# Patient Record
Sex: FEMALE | Race: WHITE | Employment: FULL TIME | URBAN - METROPOLITAN AREA
[De-identification: names, ages, dates, MRNs, and addresses within clinical notes are randomized per-mention and may not be internally consistent; named-entity substitution may affect disease eponyms.]

---

## 2017-01-03 ENCOUNTER — TELEPHONE (OUTPATIENT)
Dept: FAMILY MEDICINE CLINIC | Facility: CLINIC | Age: 52
End: 2017-01-03

## 2017-01-03 DIAGNOSIS — E01.0 THYROMEGALY: ICD-10-CM

## 2017-01-03 DIAGNOSIS — Z12.31 ENCOUNTER FOR SCREENING MAMMOGRAM FOR BREAST CANCER: Primary | ICD-10-CM

## 2017-01-03 NOTE — TELEPHONE ENCOUNTER
Pt has an order for a screening mammogram but the diagnosis needs to be for a screening mammogram, not routine health maintenance. Pt also needs an order for a thyroid ultrasound.

## 2017-01-12 ENCOUNTER — TELEPHONE (OUTPATIENT)
Dept: FAMILY MEDICINE CLINIC | Facility: CLINIC | Age: 52
End: 2017-01-12

## 2017-01-12 ENCOUNTER — HOSPITAL ENCOUNTER (OUTPATIENT)
Dept: ULTRASOUND IMAGING | Age: 52
Discharge: HOME | End: 2017-01-12
Attending: FAMILY MEDICINE
Payer: COMMERCIAL

## 2017-01-12 ENCOUNTER — HOSPITAL ENCOUNTER (OUTPATIENT)
Dept: MAMMOGRAPHY | Age: 52
Discharge: HOME | End: 2017-01-12
Attending: FAMILY MEDICINE
Payer: COMMERCIAL

## 2017-01-12 DIAGNOSIS — E01.0 THYROMEGALY: ICD-10-CM

## 2017-01-12 DIAGNOSIS — E04.1 THYROID NODULE: Primary | ICD-10-CM

## 2017-01-12 DIAGNOSIS — Z12.31 ENCOUNTER FOR SCREENING MAMMOGRAM FOR BREAST CANCER: ICD-10-CM

## 2017-01-12 PROCEDURE — 76536 US EXAM OF HEAD AND NECK: CPT

## 2017-01-12 PROCEDURE — 77067 SCR MAMMO BI INCL CAD: CPT

## 2017-01-12 NOTE — TELEPHONE ENCOUNTER
Pt notified by phone and verbalized understanding. She saw Dr. Jono Frost in the past and will follow up with him regarding the results.

## 2017-01-12 NOTE — TELEPHONE ENCOUNTER
----- Message from Sherryle Curb, DO sent at 1/12/2017  2:38 PM CST -----  Please notify Eh Do her thyroid US showed the nodule to be about the same size, maybe just a bit larger, but I would like her to see an endocrinologist, put in referral for Dr. Nena Calle

## 2017-01-16 RX ORDER — METOPROLOL SUCCINATE 100 MG/1
TABLET, EXTENDED RELEASE ORAL
Qty: 30 TABLET | Refills: 6 | Status: SHIPPED | OUTPATIENT
Start: 2017-01-16 | End: 2017-09-17

## 2017-02-14 ENCOUNTER — PATIENT MESSAGE (OUTPATIENT)
Dept: FAMILY MEDICINE CLINIC | Facility: CLINIC | Age: 52
End: 2017-02-14

## 2017-02-14 DIAGNOSIS — K63.5 POLYP OF COLON, UNSPECIFIED PART OF COLON, UNSPECIFIED TYPE: ICD-10-CM

## 2017-02-14 DIAGNOSIS — K21.00 GASTROESOPHAGEAL REFLUX DISEASE WITH ESOPHAGITIS: ICD-10-CM

## 2017-02-14 DIAGNOSIS — K31.7 MULTIPLE GASTRIC POLYPS: Primary | ICD-10-CM

## 2017-02-14 NOTE — TELEPHONE ENCOUNTER
From: Dilcia Martinez  To:  Kyrie Bowman DO  Sent: 2/14/2017 8:47 AM CST  Subject: Non-Urgent Medical Question    In 2014 I saw Dr. Pauly Brooks with [facility] and his suggestion was to have the endoscopy & colonoscopy done in 3 years rather than

## 2017-02-22 ENCOUNTER — LAB ENCOUNTER (OUTPATIENT)
Dept: LAB | Age: 52
End: 2017-02-22
Attending: INTERNAL MEDICINE
Payer: COMMERCIAL

## 2017-02-22 DIAGNOSIS — E28.2 POLYCYSTIC OVARIES: Primary | ICD-10-CM

## 2017-02-22 DIAGNOSIS — R26.81 UNSTEADY: ICD-10-CM

## 2017-02-22 LAB
FREE T4: 0.9 NG/DL (ref 0.9–1.8)
HAV AB SERPL IA-ACNC: 441 PG/ML (ref 193–986)
TSI SER-ACNC: 2.22 MIU/ML (ref 0.35–5.5)

## 2017-02-22 PROCEDURE — 36415 COLL VENOUS BLD VENIPUNCTURE: CPT

## 2017-02-22 PROCEDURE — 84443 ASSAY THYROID STIM HORMONE: CPT

## 2017-02-22 PROCEDURE — 86376 MICROSOMAL ANTIBODY EACH: CPT

## 2017-02-22 PROCEDURE — 82607 VITAMIN B-12: CPT

## 2017-02-22 PROCEDURE — 84439 ASSAY OF FREE THYROXINE: CPT

## 2017-02-23 ENCOUNTER — MED REC SCAN ONLY (OUTPATIENT)
Dept: FAMILY MEDICINE CLINIC | Facility: CLINIC | Age: 52
End: 2017-02-23

## 2017-02-23 LAB — ANTI-THYROPEROXIDASE: <28 U/ML (ref ?–60)

## 2017-03-22 ENCOUNTER — OFFICE VISIT (OUTPATIENT)
Dept: FAMILY MEDICINE CLINIC | Facility: CLINIC | Age: 52
End: 2017-03-22

## 2017-03-22 VITALS
HEART RATE: 94 BPM | HEIGHT: 64 IN | WEIGHT: 215.38 LBS | SYSTOLIC BLOOD PRESSURE: 152 MMHG | OXYGEN SATURATION: 98 % | DIASTOLIC BLOOD PRESSURE: 86 MMHG | TEMPERATURE: 99 F | RESPIRATION RATE: 18 BRPM | BODY MASS INDEX: 36.77 KG/M2

## 2017-03-22 DIAGNOSIS — R06.00 DYSPNEA ON EXERTION: ICD-10-CM

## 2017-03-22 DIAGNOSIS — R07.2 PRECORDIAL PAIN: Primary | ICD-10-CM

## 2017-03-22 DIAGNOSIS — M75.81 PECTORALIS MAJOR TENDINITIS, RIGHT: ICD-10-CM

## 2017-03-22 PROCEDURE — 99214 OFFICE O/P EST MOD 30 MIN: CPT | Performed by: FAMILY MEDICINE

## 2017-03-22 RX ORDER — METAXALONE 800 MG/1
800 TABLET ORAL 3 TIMES DAILY
Qty: 30 TABLET | Refills: 1 | Status: SHIPPED | OUTPATIENT
Start: 2017-03-22 | End: 2017-04-11
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
## 2017-04-03 ENCOUNTER — OFFICE VISIT (OUTPATIENT)
Dept: FAMILY MEDICINE CLINIC | Facility: CLINIC | Age: 52
End: 2017-04-03

## 2017-04-03 VITALS
HEIGHT: 64 IN | WEIGHT: 215.19 LBS | SYSTOLIC BLOOD PRESSURE: 140 MMHG | HEART RATE: 80 BPM | TEMPERATURE: 98 F | RESPIRATION RATE: 16 BRPM | DIASTOLIC BLOOD PRESSURE: 82 MMHG | BODY MASS INDEX: 36.74 KG/M2

## 2017-04-03 DIAGNOSIS — M54.2 NECK PAIN ON RIGHT SIDE: ICD-10-CM

## 2017-04-03 DIAGNOSIS — R07.9 RIGHT-SIDED CHEST PAIN: ICD-10-CM

## 2017-04-03 DIAGNOSIS — M25.511 ACUTE PAIN OF RIGHT SHOULDER: Primary | ICD-10-CM

## 2017-04-03 PROCEDURE — 99214 OFFICE O/P EST MOD 30 MIN: CPT | Performed by: FAMILY MEDICINE
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
## 2017-05-09 ENCOUNTER — PATIENT OUTREACH (OUTPATIENT)
Dept: FAMILY MEDICINE CLINIC | Facility: CLINIC | Age: 52
End: 2017-05-09

## 2017-05-09 NOTE — PROGRESS NOTES
Left detailed message for pt to call back and schedule nurse appt for BP recheck. Slightly elevated the last time she was here. Please schedule if she calls back.

## 2017-07-18 ENCOUNTER — SURGERY (OUTPATIENT)
Age: 52
End: 2017-07-18

## 2017-07-18 ENCOUNTER — HOSPITAL ENCOUNTER (OUTPATIENT)
Facility: HOSPITAL | Age: 52
Setting detail: HOSPITAL OUTPATIENT SURGERY
Discharge: HOME | End: 2017-07-18
Attending: INTERNAL MEDICINE | Admitting: INTERNAL MEDICINE
Payer: COMMERCIAL

## 2017-07-18 VITALS
DIASTOLIC BLOOD PRESSURE: 83 MMHG | HEART RATE: 82 BPM | OXYGEN SATURATION: 99 % | HEIGHT: 64 IN | SYSTOLIC BLOOD PRESSURE: 131 MMHG | RESPIRATION RATE: 20 BRPM

## 2017-07-18 PROCEDURE — 4A0B7BZ MEASUREMENT OF GASTROINTESTINAL PRESSURE, VIA NATURAL OR ARTIFICIAL OPENING: ICD-10-PCS | Performed by: INTERNAL MEDICINE

## 2017-07-18 NOTE — H&P
[address] [facility] Patient Status:  Hospital Outpatient Surgery    3/3/1965 MRN [id number]   [facility] ENDOSCOPY Attending Donny Springer MD   Hosp Day # 0 PCP Laurel Higgins DO     History of Pr Inhalation Aerosol, Inhale 2 puffs into the lungs 2 (two) times daily. , Disp: 1 Inhaler, Rfl: 12  •  Fluticasone Propionate (FLONASE) 50 MCG/ACT Nasal Suspension, 2 sprays by Each Nare route daily. , Disp: 1 Bottle, Rfl: 3  •  estradiol (CLIMARA) 0.05 MG/24 denies history of depression, anxiety, history of physical or sexual abuse, stress, history of eating disorder. Skin: Denies severe itching, unusual moles, rash, flushing, change in hair or nails.   Bone/joint: Denies arthritis, back pain, joint pain, oste

## 2017-07-19 NOTE — OPERATIVE REPORT
GI Manometry Report    Name: Patti Benito    Referring physician:Dr. Kel Raphael    Attending physician:Dr. Love Paula    Indication: Esophageal dysphagia    Procedure: Esophageal Manometry: A solid state recording assembly comprised of 36 circumferential pressur LES.  A single hypercontractile contraction was appreciated, which is within normal variation. Recommendations: Optimize any reflux management. Avoid dietary triggers          Chew small bites and wash down aggressively.

## 2017-08-29 ENCOUNTER — OFFICE VISIT (OUTPATIENT)
Dept: FAMILY MEDICINE CLINIC | Facility: CLINIC | Age: 52
End: 2017-08-29

## 2017-08-29 VITALS
RESPIRATION RATE: 18 BRPM | SYSTOLIC BLOOD PRESSURE: 140 MMHG | DIASTOLIC BLOOD PRESSURE: 80 MMHG | TEMPERATURE: 98 F | HEART RATE: 80 BPM

## 2017-08-29 DIAGNOSIS — G47.9 SLEEP DISTURBANCE: Primary | ICD-10-CM

## 2017-08-29 DIAGNOSIS — F32.9 REACTIVE DEPRESSION: ICD-10-CM

## 2017-08-29 DIAGNOSIS — S99.911A INJURY OF RIGHT ANKLE, INITIAL ENCOUNTER: ICD-10-CM

## 2017-08-29 PROCEDURE — 99214 OFFICE O/P EST MOD 30 MIN: CPT | Performed by: FAMILY MEDICINE

## 2017-08-29 NOTE — PROGRESS NOTES
Paul Bowie is a 47year old female.   HPI:   Gloria Astudillo thinks she rolled her ankle while walking her dog, she cannot recall the exact mechanism of injury, most of her pain  is anterior and hurts to flex her foot, there is minimal edema noted, has taken noth denies abdominal pain and denies heartburn  NEURO: denies headaches  EXT: right foot/ankle pain  PSYCH: reactive depression due to recent death of   EXAM:   /80   Pulse 80   Temp 97.8 °F (36.6 °C) (Temporal)   Resp 18   LMP 04/01/2007   GENERA

## 2017-08-30 ENCOUNTER — LAB ENCOUNTER (OUTPATIENT)
Dept: LAB | Age: 52
End: 2017-08-30
Attending: INTERNAL MEDICINE
Payer: COMMERCIAL

## 2017-08-30 ENCOUNTER — MED REC SCAN ONLY (OUTPATIENT)
Dept: FAMILY MEDICINE CLINIC | Facility: CLINIC | Age: 52
End: 2017-08-30

## 2017-08-30 DIAGNOSIS — Z86.010 HX OF COLONIC POLYPS: Primary | ICD-10-CM

## 2017-08-30 PROBLEM — D36.9 ADENOMATOUS POLYP: Status: ACTIVE | Noted: 2017-08-30

## 2017-08-30 PROCEDURE — 88305 TISSUE EXAM BY PATHOLOGIST: CPT

## 2017-09-16 NOTE — TELEPHONE ENCOUNTER
Last refill: 1- #30 with 6 refills  Last Visit: 8-  Next Visit: No future appointments. Forward to Dr. Kassy Torres please advise on refills. Thanks.

## 2017-09-17 RX ORDER — METOPROLOL SUCCINATE 100 MG/1
TABLET, EXTENDED RELEASE ORAL
Qty: 30 TABLET | Refills: 5 | Status: SHIPPED | OUTPATIENT
Start: 2017-09-17 | End: 2018-04-28

## 2017-10-25 RX ORDER — PREDNISONE 10 MG/1
TABLET ORAL
Qty: 18 TABLET | Refills: 0 | Status: SHIPPED | OUTPATIENT
Start: 2017-10-25 | End: 2017-11-04 | Stop reason: ALTCHOICE

## 2017-11-04 ENCOUNTER — OFFICE VISIT (OUTPATIENT)
Dept: FAMILY MEDICINE CLINIC | Facility: CLINIC | Age: 52
End: 2017-11-04

## 2017-11-04 VITALS
TEMPERATURE: 98 F | HEART RATE: 108 BPM | HEIGHT: 64 IN | BODY MASS INDEX: 36.64 KG/M2 | SYSTOLIC BLOOD PRESSURE: 132 MMHG | WEIGHT: 214.63 LBS | DIASTOLIC BLOOD PRESSURE: 88 MMHG | RESPIRATION RATE: 16 BRPM

## 2017-11-04 DIAGNOSIS — R00.2 PALPITATIONS: ICD-10-CM

## 2017-11-04 DIAGNOSIS — L23.89 ALLERGIC CONTACT DERMATITIS DUE TO OTHER AGENTS: Primary | ICD-10-CM

## 2017-11-04 PROCEDURE — 99214 OFFICE O/P EST MOD 30 MIN: CPT | Performed by: FAMILY MEDICINE

## 2017-11-04 RX ORDER — MOMETASONE FUROATE 1 MG/G
CREAM TOPICAL
Qty: 60 G | Refills: 1 | Status: SHIPPED | OUTPATIENT
Start: 2017-11-04 | End: 2018-01-17

## 2017-11-04 NOTE — PROGRESS NOTES
Peyman Huntley is a 47year old female.   HPI:   Bashir Hickey, is here for discussion of rash she developed under her breasts, She had poison ivy and was on a prednisone taper, she developed the rash over the course of the last few days, she is also experiencing p rash under both breasts, spreading   RESPIRATORY: denies shortness of breath with exertion  CARDIOVASCULAR: denies chest pain on exertion  GI: denies abdominal pain and denies heartburn  NEURO: denies headaches    EXAM:   /88   Pulse 108   Temp 97.8

## 2017-11-15 ENCOUNTER — OFFICE VISIT (OUTPATIENT)
Dept: FAMILY MEDICINE CLINIC | Facility: CLINIC | Age: 52
End: 2017-11-15

## 2017-11-15 VITALS
SYSTOLIC BLOOD PRESSURE: 130 MMHG | TEMPERATURE: 98 F | RESPIRATION RATE: 16 BRPM | HEART RATE: 88 BPM | DIASTOLIC BLOOD PRESSURE: 80 MMHG

## 2017-11-15 DIAGNOSIS — L24.89 IRRITANT CONTACT DERMATITIS DUE TO OTHER AGENTS: Primary | ICD-10-CM

## 2017-11-15 DIAGNOSIS — E66.9 OBESITY (BMI 30-39.9): ICD-10-CM

## 2017-11-15 DIAGNOSIS — J30.89 CHRONIC NONSEASONAL ALLERGIC RHINITIS DUE TO OTHER ALLERGEN: ICD-10-CM

## 2017-11-15 PROCEDURE — 99214 OFFICE O/P EST MOD 30 MIN: CPT | Performed by: FAMILY MEDICINE

## 2017-11-15 NOTE — PROGRESS NOTES
Padmini Elizabeth is a 47year old female.   HPI:   Buddy Epperson, is here for discussion of rash she developed under her breasts, She had poison ivy and was on a prednisone taper, she developed the rash over the course of the last few days, she is also experiencing p rash under both breasts, spreading   RESPIRATORY: denies shortness of breath with exertion  CARDIOVASCULAR: denies chest pain on exertion  GI: denies abdominal pain and denies heartburn  NEURO: denies headaches    EXAM:   /80   Pulse 88   Temp 98.4 °

## 2017-12-11 ENCOUNTER — TELEPHONE (OUTPATIENT)
Dept: FAMILY MEDICINE CLINIC | Facility: CLINIC | Age: 52
End: 2017-12-11

## 2017-12-11 ENCOUNTER — MED REC SCAN ONLY (OUTPATIENT)
Dept: FAMILY MEDICINE CLINIC | Facility: CLINIC | Age: 52
End: 2017-12-11

## 2017-12-11 NOTE — TELEPHONE ENCOUNTER
LM for pt Per written note by Dr. Rodrigez Prim- labs look good  Follow up in 6 months to a year.     Wendy Beal, 12/11/17, 12:47 PM

## 2017-12-16 ENCOUNTER — PATIENT MESSAGE (OUTPATIENT)
Dept: FAMILY MEDICINE CLINIC | Facility: CLINIC | Age: 52
End: 2017-12-16

## 2017-12-16 NOTE — TELEPHONE ENCOUNTER
From: Lora Holland  To: Faiza Lara DO  Sent: 12/16/2017 8:33 AM CST  Subject: Referral Request    In November I saw Dr Gala Szymanski from [facility] and had a prism added to my lens for the amblyopia \"lazy eye\" that has changed.  Sh

## 2017-12-18 ENCOUNTER — HOSPITAL ENCOUNTER (OUTPATIENT)
Dept: NUCLEAR MEDICINE | Facility: HOSPITAL | Age: 52
Discharge: HOME | End: 2017-12-18
Attending: INTERNAL MEDICINE
Payer: COMMERCIAL

## 2017-12-18 DIAGNOSIS — K30 FUNCTIONAL DYSPEPSIA: ICD-10-CM

## 2017-12-18 DIAGNOSIS — R07.9 CHEST PAIN, UNSPECIFIED TYPE: ICD-10-CM

## 2017-12-18 PROCEDURE — 78264 GASTRIC EMPTYING IMG STUDY: CPT | Performed by: INTERNAL MEDICINE

## 2018-01-03 ENCOUNTER — TELEPHONE (OUTPATIENT)
Dept: FAMILY MEDICINE CLINIC | Facility: CLINIC | Age: 53
End: 2018-01-03

## 2018-01-08 NOTE — TELEPHONE ENCOUNTER
Spoke with [facility] and they do not know of any providers that specialize in vision rehabilitation.   They did suggest that pt see in network optometrist and have them put in the referral along with a letter of medical need stating the pt needed this type of the

## 2018-01-17 ENCOUNTER — HOSPITAL ENCOUNTER (OUTPATIENT)
Age: 53
Discharge: HOME | End: 2018-01-17
Attending: FAMILY MEDICINE
Payer: COMMERCIAL

## 2018-01-17 ENCOUNTER — APPOINTMENT (OUTPATIENT)
Dept: GENERAL RADIOLOGY | Age: 53
End: 2018-01-17
Attending: FAMILY MEDICINE
Payer: COMMERCIAL

## 2018-01-17 VITALS
HEART RATE: 103 BPM | RESPIRATION RATE: 16 BRPM | OXYGEN SATURATION: 98 % | TEMPERATURE: 99 F | SYSTOLIC BLOOD PRESSURE: 146 MMHG | DIASTOLIC BLOOD PRESSURE: 85 MMHG

## 2018-01-17 DIAGNOSIS — I49.3 PREMATURE VENTRICULAR CONTRACTIONS: Primary | ICD-10-CM

## 2018-01-17 LAB
#LYMPHOCYTE IC: 2.7 X10ˆ3/UL (ref 0.9–3.2)
#MXD IC: 0.8 X10ˆ3/UL (ref 0.1–1)
#NEUTROPHIL IC: 4.3 X10ˆ3/UL (ref 1.3–6.7)
CREAT SERPL-MCNC: 1 MG/DL (ref 0.55–1.02)
GLUCOSE BLD-MCNC: 97 MG/DL (ref 70–99)
HCT IC: 38.9 % (ref 37–54)
HGB IC: 12.2 G/DL (ref 11.7–16)
ISTAT BLOOD GAS TCO2: 28 MMOL/L (ref 22–32)
ISTAT BUN: 12 MG/DL (ref 8–20)
ISTAT CHLORIDE: 104 MMOL/L (ref 101–111)
ISTAT HEMATOCRIT: 36 % (ref 34–50)
ISTAT IONIZED CALCIUM: 1.27 MMOL/L (ref 1.12–1.32)
ISTAT POTASSIUM: 3.6 MMOL/L (ref 3.6–5.1)
ISTAT SODIUM: 144 MMOL/L (ref 136–144)
ISTAT TROPONIN: <0.1 NG/ML (ref ?–0.1)
LYMPHOCYTES NFR BLD AUTO: 34.5 %
MCH IC: 27.1 PG (ref 27–33.2)
MCHC IC: 31.4 G/DL (ref 31–37)
MCV IC: 86.4 FL (ref 81–100)
MIXED CELL %: 9.7 %
NEUTROPHILS NFR BLD AUTO: 55.8 %
PLT IC: 497 X10ˆ3/UL (ref 150–450)
RBC IC: 4.5 X10ˆ6/UL (ref 3.8–5.1)
WBC IC: 7.8 X10ˆ3/UL (ref 4–13)

## 2018-01-17 PROCEDURE — 71046 X-RAY EXAM CHEST 2 VIEWS: CPT | Performed by: FAMILY MEDICINE

## 2018-01-17 PROCEDURE — 93010 ELECTROCARDIOGRAM REPORT: CPT

## 2018-01-17 PROCEDURE — 85025 COMPLETE CBC W/AUTO DIFF WBC: CPT | Performed by: FAMILY MEDICINE

## 2018-01-17 PROCEDURE — 80047 BASIC METABLC PNL IONIZED CA: CPT

## 2018-01-17 PROCEDURE — 36415 COLL VENOUS BLD VENIPUNCTURE: CPT

## 2018-01-17 PROCEDURE — 84484 ASSAY OF TROPONIN QUANT: CPT

## 2018-01-17 PROCEDURE — 93005 ELECTROCARDIOGRAM TRACING: CPT

## 2018-01-17 PROCEDURE — 99215 OFFICE O/P EST HI 40 MIN: CPT

## 2018-01-17 PROCEDURE — 99205 OFFICE O/P NEW HI 60 MIN: CPT

## 2018-01-17 RX ORDER — ALBUTEROL SULFATE 90 UG/1
AEROSOL, METERED RESPIRATORY (INHALATION) EVERY 6 HOURS PRN
COMMUNITY

## 2018-01-17 RX ORDER — METOPROLOL SUCCINATE 100 MG/1
100 TABLET, EXTENDED RELEASE ORAL DAILY
COMMUNITY
End: 2018-07-23

## 2018-01-17 RX ORDER — PANTOPRAZOLE SODIUM 40 MG/1
40 TABLET, DELAYED RELEASE ORAL
COMMUNITY
End: 2019-04-06

## 2018-01-17 RX ORDER — ASPIRIN 81 MG/1
324 TABLET, CHEWABLE ORAL ONCE
Status: COMPLETED | OUTPATIENT
Start: 2018-01-17 | End: 2018-01-17

## 2018-01-18 NOTE — ED PROVIDER NOTES
Patient Seen in: [address] [facility]    History   Patient presents with:  Blood Pressure    Stated Complaint: ELEVATED BLOOD PRESSURE, LEG SWELLING     HPI    62-year-old female with history of right bundle branch block, PCO S, and hypertensi per week     Comment: 2 per week       Review of Systems    Positive for stated complaint: ELEVATED BLOOD PRESSURE, LEG SWELLING   Other systems are as noted in HPI. Constitutional and vital signs reviewed.       All other systems reviewed and negative exc oriented to person, place, and time. She has normal strength. No sensory deficit. GCS eye subscore is 4. GCS verbal subscore is 5. GCS motor subscore is 6. Skin: Skin is warm, dry and intact.              ED Course     Labs Reviewed   POCT CBC - Abnormal;

## 2018-01-18 NOTE — ED INITIAL ASSESSMENT (HPI)
Pt sts went to dentist yesterday morning. /102. Took 1/2 tablet of BP med. Later in the evening took BP at [facility] and BP was lower. This morning /87. Was feeling anxious today, felt better after a walk.  Later today felt heart pounding while drnorma

## 2018-01-20 ENCOUNTER — OFFICE VISIT (OUTPATIENT)
Dept: FAMILY MEDICINE CLINIC | Facility: CLINIC | Age: 53
End: 2018-01-20

## 2018-01-20 VITALS
DIASTOLIC BLOOD PRESSURE: 80 MMHG | SYSTOLIC BLOOD PRESSURE: 146 MMHG | HEART RATE: 92 BPM | RESPIRATION RATE: 16 BRPM | WEIGHT: 218 LBS | BODY MASS INDEX: 37.22 KG/M2 | TEMPERATURE: 97 F | HEIGHT: 64 IN

## 2018-01-20 DIAGNOSIS — I45.10 RIGHT BUNDLE BRANCH BLOCK: ICD-10-CM

## 2018-01-20 DIAGNOSIS — E66.9 OBESITY (BMI 30-39.9): ICD-10-CM

## 2018-01-20 DIAGNOSIS — R07.89 OTHER CHEST PAIN: Primary | ICD-10-CM

## 2018-01-20 DIAGNOSIS — K21.00 GASTROESOPHAGEAL REFLUX DISEASE WITH ESOPHAGITIS: ICD-10-CM

## 2018-01-20 DIAGNOSIS — I10 ESSENTIAL HYPERTENSION: ICD-10-CM

## 2018-01-20 LAB
ATRIAL RATE: 95 BPM
P AXIS: 51 DEGREES
P-R INTERVAL: 172 MS
Q-T INTERVAL: 368 MS
QRS DURATION: 98 MS
QTC CALCULATION (BEZET): 462 MS
R AXIS: -4 DEGREES
T AXIS: 43 DEGREES
VENTRICULAR RATE: 95 BPM

## 2018-01-20 PROCEDURE — 99214 OFFICE O/P EST MOD 30 MIN: CPT | Performed by: FAMILY MEDICINE

## 2018-01-20 RX ORDER — HYDROCHLOROTHIAZIDE 25 MG/1
25 TABLET ORAL DAILY
Qty: 30 TABLET | Refills: 6 | Status: SHIPPED | OUTPATIENT
End: 2018-11-19

## 2018-01-20 NOTE — PROGRESS NOTES
Alissa Bunch is a 47year old female. HPI:   Madhavi Valerio was in the dentists office for a teeth cleaning, and her BP was elevated, but she continued to check her BP throughout the course of the day was a 119-988'N and diastolic og 94-70'T.  She felt agitated a Alcohol use: Yes           1.2 oz/week     Standard drinks or equivalent: 2 per week     Comment: 2 per week        REVIEW OF SYSTEMS:   GENERAL HEALTH: feels well otherwise  SKIN: denies any unusual skin lesions or rashes  RESPIRATORY: shortness of br

## 2018-01-25 ENCOUNTER — PATIENT MESSAGE (OUTPATIENT)
Dept: FAMILY MEDICINE CLINIC | Facility: CLINIC | Age: 53
End: 2018-01-25

## 2018-01-25 NOTE — TELEPHONE ENCOUNTER
From: Roosevelt Richardson  To: Farhat Kuhn DO  Sent: 1/25/2018 10:47 AM CST  Subject: Prescription Question    The hydrochlorothiazide is giving me bad heartburn and loose bowels. Is there possibly another pill you could prescribe?

## 2018-02-07 ENCOUNTER — HOSPITAL ENCOUNTER (OUTPATIENT)
Dept: CV DIAGNOSTICS | Age: 53
Discharge: HOME | End: 2018-02-07
Attending: FAMILY MEDICINE
Payer: COMMERCIAL

## 2018-02-07 DIAGNOSIS — R07.89 OTHER CHEST PAIN: ICD-10-CM

## 2018-02-07 PROCEDURE — 93350 STRESS TTE ONLY: CPT | Performed by: FAMILY MEDICINE

## 2018-02-07 PROCEDURE — 93017 CV STRESS TEST TRACING ONLY: CPT | Performed by: FAMILY MEDICINE

## 2018-02-07 PROCEDURE — 93018 CV STRESS TEST I&R ONLY: CPT | Performed by: FAMILY MEDICINE

## 2018-02-12 ENCOUNTER — OFFICE VISIT (OUTPATIENT)
Dept: FAMILY MEDICINE CLINIC | Facility: CLINIC | Age: 53
End: 2018-02-12

## 2018-02-12 ENCOUNTER — MED REC SCAN ONLY (OUTPATIENT)
Dept: FAMILY MEDICINE CLINIC | Facility: CLINIC | Age: 53
End: 2018-02-12

## 2018-02-12 VITALS
TEMPERATURE: 98 F | BODY MASS INDEX: 38 KG/M2 | SYSTOLIC BLOOD PRESSURE: 122 MMHG | RESPIRATION RATE: 14 BRPM | WEIGHT: 220 LBS | DIASTOLIC BLOOD PRESSURE: 70 MMHG | HEART RATE: 86 BPM

## 2018-02-12 DIAGNOSIS — I10 ESSENTIAL HYPERTENSION: ICD-10-CM

## 2018-02-12 DIAGNOSIS — I45.10 RIGHT BUNDLE BRANCH BLOCK: ICD-10-CM

## 2018-02-12 DIAGNOSIS — E66.9 OBESITY (BMI 30-39.9): Primary | ICD-10-CM

## 2018-02-12 PROCEDURE — 99214 OFFICE O/P EST MOD 30 MIN: CPT | Performed by: FAMILY MEDICINE

## 2018-02-12 NOTE — PROGRESS NOTES
HPI:   Alex Yoon is a 47year old female who presents for upper respiratory symptoms for  2  weeks. Patient reports sore throat, congestion, wheezing.  She is using her albuterol infrequently, she feels like her chest is tight and feels abdominal press Hypertension Mother    • Lipids Mother    • Breast Cancer Paternal Aunt 54     SECOND OCCURRANCE AGE [de-identified]      Smoking status: Never Smoker                                                              Smokeless tobacco: Never Used                      Alcoho

## 2018-04-17 ENCOUNTER — OFFICE VISIT (OUTPATIENT)
Dept: FAMILY MEDICINE CLINIC | Facility: CLINIC | Age: 53
End: 2018-04-17

## 2018-04-17 ENCOUNTER — HOSPITAL ENCOUNTER (OUTPATIENT)
Dept: GENERAL RADIOLOGY | Age: 53
Discharge: HOME | End: 2018-04-17
Attending: FAMILY MEDICINE
Payer: COMMERCIAL

## 2018-04-17 VITALS
BODY MASS INDEX: 37.19 KG/M2 | TEMPERATURE: 99 F | HEIGHT: 64 IN | RESPIRATION RATE: 14 BRPM | HEART RATE: 87 BPM | DIASTOLIC BLOOD PRESSURE: 82 MMHG | WEIGHT: 217.81 LBS | SYSTOLIC BLOOD PRESSURE: 112 MMHG

## 2018-04-17 DIAGNOSIS — M23.312 MEDIAL MENISCUS, ANTERIOR HORN DERANGEMENT, LEFT: ICD-10-CM

## 2018-04-17 DIAGNOSIS — Z12.31 ENCOUNTER FOR SCREENING MAMMOGRAM FOR BREAST CANCER: Primary | ICD-10-CM

## 2018-04-17 DIAGNOSIS — M25.562 ACUTE PAIN OF LEFT KNEE: ICD-10-CM

## 2018-04-17 DIAGNOSIS — R10.31 ABDOMINAL PAIN, RIGHT LOWER QUADRANT: ICD-10-CM

## 2018-04-17 PROCEDURE — 73562 X-RAY EXAM OF KNEE 3: CPT | Performed by: FAMILY MEDICINE

## 2018-04-17 PROCEDURE — 99214 OFFICE O/P EST MOD 30 MIN: CPT | Performed by: FAMILY MEDICINE

## 2018-04-17 RX ORDER — NAPROXEN 500 MG/1
TABLET ORAL
Qty: 180 TABLET | Refills: 0 | OUTPATIENT
Start: 2018-04-17

## 2018-04-17 RX ORDER — NAPROXEN 500 MG/1
500 TABLET ORAL 2 TIMES DAILY WITH MEALS
Qty: 28 TABLET | Refills: 0 | Status: SHIPPED | OUTPATIENT
Start: 2018-04-17 | End: 2019-10-08

## 2018-04-17 NOTE — PROGRESS NOTES
Edison Boyd is a 46year old female. HPI:   Yeny Jung is here for follow up on knee pain after she did some squats and then fell going up the stairs, since then thy have been painful and feels a sharp stabbing pain in the medial aspect of her knee.  It does Never Used                      Alcohol use: Yes           1.2 oz/week     Standard drinks or equivalent: 2 per week     Comment: 2 per week        REVIEW OF SYSTEMS:   GENERAL HEALTH: feels well otherwise  SKIN: denies any unusual skin lesions or rashes also given order for mammogram   And CT abdomen to evaluate pain.

## 2018-04-17 NOTE — PROGRESS NOTES
Location of pain: left knee, hurts more to walk  Pain score:  5/10  Duration of pain:  1 month  Redness? No  Swelling? unsure  Warm to the touch? No  Taken meds for the pain? Yes  What med? Advil How often? Prn with no relief Last time taken?  yesterday

## 2018-04-18 ENCOUNTER — HOSPITAL ENCOUNTER (OUTPATIENT)
Dept: CT IMAGING | Facility: HOSPITAL | Age: 53
Discharge: HOME | End: 2018-04-18
Attending: FAMILY MEDICINE
Payer: COMMERCIAL

## 2018-04-18 DIAGNOSIS — R10.31 ABDOMINAL PAIN, RIGHT LOWER QUADRANT: ICD-10-CM

## 2018-04-18 PROCEDURE — 74176 CT ABD & PELVIS W/O CONTRAST: CPT | Performed by: FAMILY MEDICINE

## 2018-04-19 ENCOUNTER — TELEPHONE (OUTPATIENT)
Dept: FAMILY MEDICINE CLINIC | Facility: CLINIC | Age: 53
End: 2018-04-19

## 2018-04-19 DIAGNOSIS — K21.00 GASTROESOPHAGEAL REFLUX DISEASE WITH ESOPHAGITIS: ICD-10-CM

## 2018-04-19 DIAGNOSIS — K75.81 NASH (NONALCOHOLIC STEATOHEPATITIS): Primary | ICD-10-CM

## 2018-04-19 DIAGNOSIS — Z00.00 ROUTINE HEALTH MAINTENANCE: ICD-10-CM

## 2018-04-19 NOTE — TELEPHONE ENCOUNTER
----- Message from Crow Reyna DO sent at 4/19/2018  9:54 AM CDT -----  Can notify Ignacia, that her CT showed nothing major in her abdomen, other than she has a fatty liver.  Which is generally related to her weight, can sometimes be associated with diabet

## 2018-04-19 NOTE — TELEPHONE ENCOUNTER
Pt advised of results- verbalized understanding.     I advised pt that labs have been order and she can do those at her follow up visit or as a nurse visit

## 2018-04-30 ENCOUNTER — HOSPITAL ENCOUNTER (OUTPATIENT)
Dept: MAMMOGRAPHY | Facility: HOSPITAL | Age: 53
Discharge: HOME | End: 2018-04-30
Attending: FAMILY MEDICINE
Payer: COMMERCIAL

## 2018-04-30 DIAGNOSIS — Z12.31 ENCOUNTER FOR SCREENING MAMMOGRAM FOR BREAST CANCER: ICD-10-CM

## 2018-04-30 PROCEDURE — 77063 BREAST TOMOSYNTHESIS BI: CPT | Performed by: FAMILY MEDICINE

## 2018-04-30 PROCEDURE — 77067 SCR MAMMO BI INCL CAD: CPT | Performed by: FAMILY MEDICINE

## 2018-04-30 RX ORDER — METOPROLOL SUCCINATE 100 MG/1
TABLET, EXTENDED RELEASE ORAL
Qty: 90 TABLET | Refills: 3 | Status: SHIPPED | OUTPATIENT
Start: 2018-04-30 | End: 2019-05-07

## 2018-04-30 NOTE — TELEPHONE ENCOUNTER
Last refill: 9- #30 with 5 refills  Last Visit: 4-  Next Visit: No Future Appointments        Forward to Dr. Leslie Roldan please advise on refills. Thanks.

## 2018-05-01 ENCOUNTER — OFFICE VISIT (OUTPATIENT)
Dept: PHYSICAL THERAPY | Age: 53
End: 2018-05-01
Attending: FAMILY MEDICINE
Payer: COMMERCIAL

## 2018-05-01 DIAGNOSIS — M23.312 MEDIAL MENISCUS, ANTERIOR HORN DERANGEMENT, LEFT: ICD-10-CM

## 2018-05-01 DIAGNOSIS — M25.562 ACUTE PAIN OF LEFT KNEE: ICD-10-CM

## 2018-05-01 PROCEDURE — 97110 THERAPEUTIC EXERCISES: CPT

## 2018-05-01 PROCEDURE — 97161 PT EVAL LOW COMPLEX 20 MIN: CPT

## 2018-05-01 PROCEDURE — 97140 MANUAL THERAPY 1/> REGIONS: CPT

## 2018-05-01 NOTE — PROGRESS NOTES
INITIAL EVALUATION:   Referring Physician: Dr. Carly Montesinos  Diagnosis:   -Acute pain of left knee (M25.562)  -Medial meniscus, anterior horn derangement, left (M23.312)     Date of Service: 5/1/2018     PATIENT SUMMARY    Chela Vallejo is a 46year old femal motion and terminal knee extension following recent fall. She has radiographs unremarkable for fracture.   Janae Roque would benefit from services of the physical therapist to address the above impairments to restore prior level of function  -Likely to respond care.    X___________________________________________________ Date____________________    Certification From: 4/0/8559  To:7/30/2018

## 2018-05-08 ENCOUNTER — OFFICE VISIT (OUTPATIENT)
Dept: PHYSICAL THERAPY | Age: 53
End: 2018-05-08
Attending: FAMILY MEDICINE
Payer: COMMERCIAL

## 2018-05-08 DIAGNOSIS — M25.562 ACUTE PAIN OF LEFT KNEE: ICD-10-CM

## 2018-05-08 DIAGNOSIS — M23.312 MEDIAL MENISCUS, ANTERIOR HORN DERANGEMENT, LEFT: ICD-10-CM

## 2018-05-08 PROCEDURE — 97140 MANUAL THERAPY 1/> REGIONS: CPT

## 2018-05-08 PROCEDURE — 97530 THERAPEUTIC ACTIVITIES: CPT

## 2018-05-08 PROCEDURE — 97110 THERAPEUTIC EXERCISES: CPT

## 2018-05-08 NOTE — PROGRESS NOTES
Dx:        -Acute pain of left knee   -Medial meniscus, anterior horn derangement      Authorized # of Visits:  8         Next MD visit: none scheduled  Fall Risk: standard           Precautions: n/a             Subjective: Patient reports that she feels s term goals - 8 visits)  -Demonstrate ability to ascend/descend flight stairs without no pain  -Able to perform squatting activities without complaint  -Demonstrate at least 130 degrees left knee flexion for ADL tasks.     Plan: Progress with stationary bike

## 2018-05-10 ENCOUNTER — OFFICE VISIT (OUTPATIENT)
Dept: PHYSICAL THERAPY | Age: 53
End: 2018-05-10
Attending: FAMILY MEDICINE
Payer: COMMERCIAL

## 2018-05-10 DIAGNOSIS — M23.312 MEDIAL MENISCUS, ANTERIOR HORN DERANGEMENT, LEFT: ICD-10-CM

## 2018-05-10 DIAGNOSIS — M25.562 ACUTE PAIN OF LEFT KNEE: ICD-10-CM

## 2018-05-10 PROCEDURE — 97140 MANUAL THERAPY 1/> REGIONS: CPT

## 2018-05-10 PROCEDURE — 97110 THERAPEUTIC EXERCISES: CPT

## 2018-05-10 NOTE — PROGRESS NOTES
Dx:        -Acute pain of left knee   -Medial meniscus, anterior horn derangement      Authorized # of Visits:  8         Next MD visit: none scheduled  Fall Risk: standard           Precautions: n/a             Subjective:  Patient reports she has been do consisting of continuous active range of motion knee flexion/extensionwith LE on stability ball x 5 min -Therapeutic excercise consisting of reviewed of heal slide with repeated extension progression with supine straight leg raise in neutral and slight ext

## 2018-05-15 ENCOUNTER — OFFICE VISIT (OUTPATIENT)
Dept: PHYSICAL THERAPY | Age: 53
End: 2018-05-15
Attending: FAMILY MEDICINE
Payer: COMMERCIAL

## 2018-05-15 DIAGNOSIS — M23.312 MEDIAL MENISCUS, ANTERIOR HORN DERANGEMENT, LEFT: ICD-10-CM

## 2018-05-15 DIAGNOSIS — M25.562 ACUTE PAIN OF LEFT KNEE: ICD-10-CM

## 2018-05-15 PROCEDURE — 97110 THERAPEUTIC EXERCISES: CPT

## 2018-05-15 PROCEDURE — 97140 MANUAL THERAPY 1/> REGIONS: CPT

## 2018-05-15 NOTE — PROGRESS NOTES
Dx:        -Acute pain of left knee   -Medial meniscus, anterior horn derangement      Authorized # of Visits:  8         Next MD visit: none scheduled  Fall Risk: standard           Precautions: n/a             Subjective:  Patient reports she is feeling to ascend/descend flight stairs without no pain  -Able to perform squatting activities without complaint  -Demonstrate at least 130 degrees left knee flexion for ADL tasks.     Plan: Re-assess terminal knee extension; continue with single leg press; assess ball x 5 min    -Therapeutic excercise consisting of instruction in terminal knee extension with resisted band x 30 reps x 3 sets with band and door anchor provide to patient -Manual Therapy consisting of joint mobilization to restore full terminal knee ex

## 2018-05-17 ENCOUNTER — OFFICE VISIT (OUTPATIENT)
Dept: PHYSICAL THERAPY | Age: 53
End: 2018-05-17
Attending: FAMILY MEDICINE
Payer: COMMERCIAL

## 2018-05-17 DIAGNOSIS — M25.562 ACUTE PAIN OF LEFT KNEE: ICD-10-CM

## 2018-05-17 DIAGNOSIS — M23.312 MEDIAL MENISCUS, ANTERIOR HORN DERANGEMENT, LEFT: ICD-10-CM

## 2018-05-17 PROCEDURE — 97110 THERAPEUTIC EXERCISES: CPT

## 2018-05-17 PROCEDURE — 97140 MANUAL THERAPY 1/> REGIONS: CPT

## 2018-05-17 NOTE — PROGRESS NOTES
Dx:        -Acute pain of left knee   -Medial meniscus, anterior horn derangement      Authorized # of Visits:  8         Next MD visit: none scheduled  Fall Risk: standard           Precautions: n/a           Subjective:   Patient reports decrease stiffne stationary bike for 8 minutes for range of motion stimulus     -Therapeutic excercise consisting of reviewed of heal slide with repeated extension progression with supine straight leg raise in neutral and slight external rotation with patient performing 8 knee extension -Manual Therapy consisting of joint mobilization to restore full terminal knee extension    -Therapeutic excercise consisting of DL leg press level 6.0 resistance: 25 reps x 2 sets -Therapeutic exercise consisting of heal lift w/ terminal kn

## 2018-05-19 ENCOUNTER — PATIENT OUTREACH (OUTPATIENT)
Dept: FAMILY MEDICINE CLINIC | Facility: CLINIC | Age: 53
End: 2018-05-19

## 2018-05-22 ENCOUNTER — OFFICE VISIT (OUTPATIENT)
Dept: PHYSICAL THERAPY | Age: 53
End: 2018-05-22
Attending: FAMILY MEDICINE
Payer: COMMERCIAL

## 2018-05-22 DIAGNOSIS — M25.562 ACUTE PAIN OF LEFT KNEE: ICD-10-CM

## 2018-05-22 DIAGNOSIS — M23.312 MEDIAL MENISCUS, ANTERIOR HORN DERANGEMENT, LEFT: ICD-10-CM

## 2018-05-22 PROCEDURE — 97140 MANUAL THERAPY 1/> REGIONS: CPT

## 2018-05-22 PROCEDURE — 97110 THERAPEUTIC EXERCISES: CPT

## 2018-05-22 NOTE — PROGRESS NOTES
Dx:        -Acute pain of left knee   -Medial meniscus, anterior horn derangement      Authorized # of Visits:  8         Next MD visit: none scheduled  Fall Risk: standard           Precautions: n/a           Subjective:   Described medial knee pain; over -Therapeutic excercise consisting of reviewed of heal slide with repeated extension progression with supine straight leg raise in neutral and slight external rotation with patient performing 8 reps to point of fatigue.   (HEP)  -Manual Therapy consisting excercise consisting of instruction in terminal knee extension with resisted band x 30 reps x 3 sets with band and door anchor provide to patient -Manual Therapy consisting of joint mobilization to restore full terminal knee extension -Manual Therapy consi

## 2018-05-24 ENCOUNTER — OFFICE VISIT (OUTPATIENT)
Dept: PHYSICAL THERAPY | Age: 53
End: 2018-05-24
Attending: FAMILY MEDICINE
Payer: COMMERCIAL

## 2018-05-24 DIAGNOSIS — M25.562 ACUTE PAIN OF LEFT KNEE: ICD-10-CM

## 2018-05-24 DIAGNOSIS — M23.312 MEDIAL MENISCUS, ANTERIOR HORN DERANGEMENT, LEFT: ICD-10-CM

## 2018-05-24 PROCEDURE — 97110 THERAPEUTIC EXERCISES: CPT

## 2018-05-24 PROCEDURE — 97530 THERAPEUTIC ACTIVITIES: CPT

## 2018-05-25 NOTE — PROGRESS NOTES
Dx:        -Acute pain of left knee   -Medial meniscus, anterior horn derangement      Authorized # of Visits:  8         Next MD visit: none scheduled  Fall Risk: standard           Precautions: n/a           Subjective:   Patient reports she was sore aft term goals - 8 visits  -Demonstrate ability to ascend/descend flight stairs without no pain  -Able to perform squatting activities without complaint  -Demonstrate at least 130 degrees left knee flexion for ADL tasks.   -Goal met    Plan:  Progress home prog extension progression with supine straight leg raise in neutral and slight external rotation with patient performing 12 reps to point of fatigue -Manual Therapy consisting of joint mobilization to restore full terminal knee extension   -Manual Therapy cons mobilization tibial external rotation -Supine knee flexion/extensionwith LE stability ball: 5 minutes (denies knee pain)                             Charges:    Therapeutic excercise x 2  Therapeutic activity x 1   Total Timed Treatment: 40 min    Total Chirag

## 2018-05-29 ENCOUNTER — OFFICE VISIT (OUTPATIENT)
Dept: PHYSICAL THERAPY | Age: 53
End: 2018-05-29
Attending: FAMILY MEDICINE
Payer: COMMERCIAL

## 2018-05-29 DIAGNOSIS — M23.312 MEDIAL MENISCUS, ANTERIOR HORN DERANGEMENT, LEFT: ICD-10-CM

## 2018-05-29 DIAGNOSIS — M25.562 ACUTE PAIN OF LEFT KNEE: ICD-10-CM

## 2018-05-29 PROCEDURE — 97110 THERAPEUTIC EXERCISES: CPT

## 2018-05-29 PROCEDURE — 97530 THERAPEUTIC ACTIVITIES: CPT

## 2018-05-29 NOTE — PROGRESS NOTES
Discharge Summary    Pt has attended 8 visits in Physical Therapy.      Dx:        -Acute pain of left knee   -Medial meniscus, anterior horn derangement  Authorized # of Visits:  8         Next MD visit: none scheduled  Fall Risk: standard           Preca visits  -Demonstrate ability to ascend/descend flight stairs without no pain  -Goal unmet; progressing toward goal achievement  -Able to perform squatting activities without complaint  -Goal unmet; progressing toward goal achievement  -Demonstrate at least shuttle 6.0 level resistance: 20 reps x 3 sets  -Single leg press @ shuttle 4.0 level resistance: 15 reps x 3 sets   -Discontinued double leg press   -Single leg press @ shuttle 4.0 level resistance: 15 reps x 3 sets -Single leg press @ shuttle 4.0 level r step - down 4 inch step 12 reps x 3 sets (2/10 knee pain - L) Lateral step - down 4 inch step 12 reps x 3 sets (2/10 knee pain - L)    -Therapeutic excercise consisting of instruction in terminal knee extension with resisted band x 30 reps x 3 sets with ba

## 2018-07-23 ENCOUNTER — OFFICE VISIT (OUTPATIENT)
Dept: FAMILY MEDICINE CLINIC | Facility: CLINIC | Age: 53
End: 2018-07-23

## 2018-07-23 VITALS
BODY MASS INDEX: 37 KG/M2 | SYSTOLIC BLOOD PRESSURE: 130 MMHG | DIASTOLIC BLOOD PRESSURE: 86 MMHG | HEART RATE: 84 BPM | WEIGHT: 215 LBS

## 2018-07-23 DIAGNOSIS — M25.562 CHRONIC PAIN OF LEFT KNEE: Primary | ICD-10-CM

## 2018-07-23 DIAGNOSIS — G89.29 CHRONIC PAIN OF LEFT KNEE: Primary | ICD-10-CM

## 2018-07-23 DIAGNOSIS — Z00.00 ROUTINE HEALTH MAINTENANCE: ICD-10-CM

## 2018-07-23 DIAGNOSIS — Z15.89 HLA B27 (HLA B27 POSITIVE): ICD-10-CM

## 2018-07-23 DIAGNOSIS — M23.312 MEDIAL MENISCUS, ANTERIOR HORN DERANGEMENT, LEFT: ICD-10-CM

## 2018-07-23 DIAGNOSIS — K75.81 NASH (NONALCOHOLIC STEATOHEPATITIS): ICD-10-CM

## 2018-07-23 DIAGNOSIS — I10 ESSENTIAL HYPERTENSION: ICD-10-CM

## 2018-07-23 LAB
ALBUMIN SERPL-MCNC: 3.5 G/DL (ref 3.5–4.8)
ALBUMIN/GLOB SERPL: 0.8 {RATIO} (ref 1–2)
ALP LIVER SERPL-CCNC: 121 U/L (ref 41–108)
ALT SERPL-CCNC: 63 U/L (ref 14–54)
ANION GAP SERPL CALC-SCNC: 8 MMOL/L (ref 0–18)
AST SERPL-CCNC: 36 U/L (ref 15–41)
BILIRUB SERPL-MCNC: 0.4 MG/DL (ref 0.1–2)
BUN BLD-MCNC: 10 MG/DL (ref 8–20)
BUN/CREAT SERPL: 10.2 (ref 10–20)
CALCIUM BLD-MCNC: 9.6 MG/DL (ref 8.3–10.3)
CHLORIDE SERPL-SCNC: 108 MMOL/L (ref 101–111)
CHOLEST SMN-MCNC: 171 MG/DL (ref ?–200)
CO2 SERPL-SCNC: 27 MMOL/L (ref 22–32)
CREAT BLD-MCNC: 0.98 MG/DL (ref 0.55–1.02)
DEPRECATED HBV CORE AB SER IA-ACNC: 13.7 NG/ML (ref 18–340)
ERYTHROCYTE [DISTWIDTH] IN BLOOD BY AUTOMATED COUNT: 12.8 % (ref 11.5–16)
GLOBULIN PLAS-MCNC: 4.2 G/DL (ref 2.5–3.7)
GLUCOSE BLD-MCNC: 117 MG/DL (ref 70–99)
HAV IGM SER QL: NONREACTIVE
HBV CORE IGM SER QL: NONREACTIVE
HBV SURFACE AG SERPL QL IA: NONREACTIVE
HCT VFR BLD AUTO: 42.7 % (ref 34–50)
HCV AB SERPL QL IA: NONREACTIVE
HDLC SERPL-MCNC: 45 MG/DL (ref 40–59)
HGB BLD-MCNC: 14.3 G/DL (ref 12–16)
LDLC SERPL CALC-MCNC: 112 MG/DL (ref ?–100)
M PROTEIN MFR SERPL ELPH: 7.7 G/DL (ref 6.1–8.3)
MCH RBC QN AUTO: 30.4 PG (ref 27–33.2)
MCHC RBC AUTO-ENTMCNC: 33.5 G/DL (ref 31–37)
MCV RBC AUTO: 90.7 FL (ref 81–100)
NONHDLC SERPL-MCNC: 126 MG/DL (ref ?–130)
OSMOLALITY SERPL CALC.SUM OF ELEC: 296 MOSM/KG (ref 275–295)
PLATELET # BLD AUTO: 333 10(3)UL (ref 150–450)
POTASSIUM SERPL-SCNC: 3.8 MMOL/L (ref 3.6–5.1)
RBC # BLD AUTO: 4.71 X10(6)UL (ref 3.8–5.1)
RED CELL DISTRIBUTION WIDTH-SD: 42.2 FL (ref 35.1–46.3)
SODIUM SERPL-SCNC: 143 MMOL/L (ref 136–144)
T4 FREE SERPL-MCNC: 0.8 NG/DL (ref 0.9–1.8)
TRIGL SERPL-MCNC: 72 MG/DL (ref 30–149)
TSI SER-ACNC: 2.19 MIU/ML (ref 0.35–5.5)
VLDLC SERPL CALC-MCNC: 14 MG/DL (ref 0–30)
WBC # BLD AUTO: 6.1 X10(3) UL (ref 4–13)

## 2018-07-23 PROCEDURE — 36415 COLL VENOUS BLD VENIPUNCTURE: CPT | Performed by: FAMILY MEDICINE

## 2018-07-23 PROCEDURE — 85027 COMPLETE CBC AUTOMATED: CPT | Performed by: FAMILY MEDICINE

## 2018-07-23 PROCEDURE — 86225 DNA ANTIBODY NATIVE: CPT | Performed by: FAMILY MEDICINE

## 2018-07-23 PROCEDURE — 80074 ACUTE HEPATITIS PANEL: CPT | Performed by: FAMILY MEDICINE

## 2018-07-23 PROCEDURE — 86235 NUCLEAR ANTIGEN ANTIBODY: CPT | Performed by: FAMILY MEDICINE

## 2018-07-23 PROCEDURE — 82728 ASSAY OF FERRITIN: CPT | Performed by: FAMILY MEDICINE

## 2018-07-23 PROCEDURE — 80053 COMPREHEN METABOLIC PANEL: CPT | Performed by: FAMILY MEDICINE

## 2018-07-23 PROCEDURE — 84443 ASSAY THYROID STIM HORMONE: CPT | Performed by: FAMILY MEDICINE

## 2018-07-23 PROCEDURE — 84439 ASSAY OF FREE THYROXINE: CPT | Performed by: FAMILY MEDICINE

## 2018-07-23 PROCEDURE — 86038 ANTINUCLEAR ANTIBODIES: CPT | Performed by: FAMILY MEDICINE

## 2018-07-23 PROCEDURE — 80061 LIPID PANEL: CPT | Performed by: FAMILY MEDICINE

## 2018-07-23 PROCEDURE — 99214 OFFICE O/P EST MOD 30 MIN: CPT | Performed by: FAMILY MEDICINE

## 2018-07-23 NOTE — PROGRESS NOTES
1 gold, mint and lavender tube  Collected from L AC using straight needle and 1 attempt. Pt tolerated and was sent home in stable condition.

## 2018-07-23 NOTE — PROGRESS NOTES
Alissa Bunch is a 46year old female.   HPI:   Madhavi Valerio is here for follow up on her knee, and it continues to cause her pain, it is worse when she bends it, it continues to lock  and cause pain, she has been doing PT, and even after that it continues to be • Polycystic ovaries    • Uterine fibroid       Social History:  Smoking status: Never Smoker                                                              Smokeless tobacco: Never Used                      Alcohol use: Yes           1.2 oz/week     Maulik Lam

## 2018-07-24 ENCOUNTER — TELEPHONE (OUTPATIENT)
Dept: FAMILY MEDICINE CLINIC | Facility: CLINIC | Age: 53
End: 2018-07-24

## 2018-07-24 DIAGNOSIS — R79.0 LOW IRON STORES: Primary | ICD-10-CM

## 2018-07-25 LAB
ANA SCREEN: POSITIVE
CENTROMERE AUTOAB: <100 AU/ML (ref ?–100)
DSDNA AUTOAB: <100 IU/ML (ref ?–100)
HISTONE AUTOAB: <100 AU/ML (ref ?–100)
JO-1 AUTOAB: <100 AU/ML (ref ?–100)
RNP AUTOAB: 122 AU/ML (ref ?–100)
SCL-70 AUTOAB: <100 AU/ML (ref ?–100)
SM AUTOAB (SMITH): <100 AU/ML (ref ?–100)
SSA AUTOAB: <100 AU/ML (ref ?–100)
SSB AUTOAB: <100 AU/ML (ref ?–100)

## 2018-07-26 ENCOUNTER — TELEPHONE (OUTPATIENT)
Dept: FAMILY MEDICINE CLINIC | Facility: CLINIC | Age: 53
End: 2018-07-26

## 2018-07-26 NOTE — TELEPHONE ENCOUNTER
Pt advised of results- verbalized understanding    Results fraxed to Dr. Bruno Sabillon [phone]

## 2018-07-26 NOTE — TELEPHONE ENCOUNTER
----- Message from Anjali Aleman DO sent at 7/26/2018  2:47 PM CDT -----  Can notify Chely Mylar her GABRIEL came back Positive , but just barely and likely not enough to cause any issues, I think Dr. Graciela Carey ordered these GI let her know  we forwarded  them t

## 2018-07-31 ENCOUNTER — TELEPHONE (OUTPATIENT)
Dept: FAMILY MEDICINE CLINIC | Facility: CLINIC | Age: 53
End: 2018-07-31

## 2018-07-31 ENCOUNTER — HOSPITAL ENCOUNTER (OUTPATIENT)
Dept: MRI IMAGING | Age: 53
Discharge: HOME | End: 2018-07-31
Attending: FAMILY MEDICINE
Payer: COMMERCIAL

## 2018-07-31 DIAGNOSIS — G89.29 CHRONIC PAIN OF LEFT KNEE: ICD-10-CM

## 2018-07-31 DIAGNOSIS — M25.562 CHRONIC PAIN OF LEFT KNEE: ICD-10-CM

## 2018-07-31 DIAGNOSIS — M23.312 MEDIAL MENISCUS, ANTERIOR HORN DERANGEMENT, LEFT: Primary | ICD-10-CM

## 2018-07-31 DIAGNOSIS — M25.562 MEDIAL KNEE PAIN, LEFT: ICD-10-CM

## 2018-07-31 PROCEDURE — 73721 MRI JNT OF LWR EXTRE W/O DYE: CPT | Performed by: FAMILY MEDICINE

## 2018-07-31 NOTE — TELEPHONE ENCOUNTER
----- Message from Laurel Higgins DO sent at 7/31/2018  1:52 PM CDT -----  Can notify [facility] her MRI showed jayne issues with the cartilage in her knee, and she has a mild MCL sprain, if she has not seen an ortho I would suggest Dr. Conley Beams [phone], or

## 2018-07-31 NOTE — TELEPHONE ENCOUNTER
Pt notified of results- verbalized understanding.   Pt was provided with the phone number to set up a visit with Dr. Vlad Galicia

## 2018-10-15 DIAGNOSIS — E66.9 OBESITY (BMI 30-39.9): ICD-10-CM

## 2018-10-15 DIAGNOSIS — J45.990 EXERCISE-INDUCED ASTHMA: ICD-10-CM

## 2018-10-15 DIAGNOSIS — J30.1 ALLERGIC RHINITIS DUE TO POLLEN: ICD-10-CM

## 2018-10-15 RX ORDER — BUDESONIDE AND FORMOTEROL FUMARATE DIHYDRATE 160; 4.5 UG/1; UG/1
2 AEROSOL RESPIRATORY (INHALATION) 2 TIMES DAILY
Qty: 1 INHALER | Refills: 6 | Status: SHIPPED | OUTPATIENT
Start: 2018-10-15 | End: 2020-10-13

## 2018-11-20 RX ORDER — HYDROCHLOROTHIAZIDE 25 MG/1
TABLET ORAL
Qty: 30 TABLET | Refills: 5 | Status: SHIPPED | OUTPATIENT
Start: 2018-11-20 | End: 2020-04-27

## 2018-11-23 ENCOUNTER — TELEPHONE (OUTPATIENT)
Dept: FAMILY MEDICINE CLINIC | Facility: CLINIC | Age: 53
End: 2018-11-23

## 2018-12-04 ENCOUNTER — TELEPHONE (OUTPATIENT)
Dept: FAMILY MEDICINE CLINIC | Facility: CLINIC | Age: 53
End: 2018-12-04

## 2018-12-04 NOTE — TELEPHONE ENCOUNTER
Lab results received from [facility]    Per DS over all labs looked good- her lipids were just a bit higher this time but not terrible.     LM for pt to call back

## 2018-12-13 ENCOUNTER — HOSPITAL ENCOUNTER (OUTPATIENT)
Dept: GENERAL RADIOLOGY | Age: 53
Discharge: HOME | End: 2018-12-13
Attending: FAMILY MEDICINE
Payer: COMMERCIAL

## 2018-12-13 ENCOUNTER — OFFICE VISIT (OUTPATIENT)
Dept: FAMILY MEDICINE CLINIC | Facility: CLINIC | Age: 53
End: 2018-12-13

## 2018-12-13 VITALS
WEIGHT: 220.38 LBS | TEMPERATURE: 98 F | BODY MASS INDEX: 37.62 KG/M2 | SYSTOLIC BLOOD PRESSURE: 128 MMHG | HEIGHT: 64 IN | RESPIRATION RATE: 14 BRPM | DIASTOLIC BLOOD PRESSURE: 88 MMHG | HEART RATE: 74 BPM

## 2018-12-13 DIAGNOSIS — M79.605 LEFT LEG PAIN: Primary | ICD-10-CM

## 2018-12-13 DIAGNOSIS — M79.605 LEFT LEG PAIN: ICD-10-CM

## 2018-12-13 DIAGNOSIS — S80.12XA HEMATOMA OF LEFT LOWER EXTREMITY, INITIAL ENCOUNTER: ICD-10-CM

## 2018-12-13 DIAGNOSIS — S80.12XA CONTUSION OF LEFT LOWER LEG, INITIAL ENCOUNTER: ICD-10-CM

## 2018-12-13 PROCEDURE — 99213 OFFICE O/P EST LOW 20 MIN: CPT | Performed by: FAMILY MEDICINE

## 2018-12-13 PROCEDURE — 73590 X-RAY EXAM OF LOWER LEG: CPT | Performed by: FAMILY MEDICINE

## 2018-12-13 NOTE — PROGRESS NOTES
Kevin Degroot is a 46year old female.   HPI:   Rd Joy is here for evaluation of left shin pain after she fell and struck her leg on a curb, and since then has shad persistent pain and tenderness, there was some swelling, but it is now less swollen there is exertion  CARDIOVASCULAR: denies chest pain on exertion  GI: denies abdominal pain and denies heartburn  NEURO: denies headaches    EXAM:   /88   Pulse 74   Temp 98.4 °F (36.9 °C) (Temporal)   Resp 14   Ht 64\"   Wt 220 lb 6.4 oz   LMP 04/01/2007   B

## 2019-01-28 RX ORDER — OSELTAMIVIR PHOSPHATE 75 MG/1
75 CAPSULE ORAL 2 TIMES DAILY
Qty: 10 CAPSULE | Refills: 0 | Status: SHIPPED | OUTPATIENT
Start: 2019-01-28 | End: 2019-10-08

## 2019-03-29 ENCOUNTER — TELEPHONE (OUTPATIENT)
Dept: FAMILY MEDICINE CLINIC | Facility: CLINIC | Age: 54
End: 2019-03-29

## 2019-03-29 NOTE — TELEPHONE ENCOUNTER
Patient reports a year of occassional PVCs for which she had stress test last year that was normal.    The past few days shes feeling an odd sensation in her chest throughout the day, not necessarily \"pain\" but a discomfort, associated with frequent lexy

## 2019-04-01 ENCOUNTER — PATIENT MESSAGE (OUTPATIENT)
Dept: FAMILY MEDICINE CLINIC | Facility: CLINIC | Age: 54
End: 2019-04-01

## 2019-04-01 DIAGNOSIS — R00.2 PALPITATIONS: Primary | ICD-10-CM

## 2019-04-01 NOTE — TELEPHONE ENCOUNTER
From: aJmarcus Pineda  To: Zane Urrutia DO  Sent: 4/1/2019 3:04 PM CDT  Subject: Referral Request    I have an appointment with Dr. Jackson Rowley on Friday, April 5th at [facility] in [facility].  Will i be able to get the referral by then

## 2019-04-05 ENCOUNTER — PATIENT MESSAGE (OUTPATIENT)
Dept: FAMILY MEDICINE CLINIC | Facility: CLINIC | Age: 54
End: 2019-04-05

## 2019-04-06 RX ORDER — PANTOPRAZOLE SODIUM 40 MG/1
40 TABLET, DELAYED RELEASE ORAL
Qty: 90 TABLET | Refills: 2 | Status: SHIPPED | OUTPATIENT
Start: 2019-04-06 | End: 2020-10-13

## 2019-04-06 NOTE — TELEPHONE ENCOUNTER
From: Katie Silva  To: Ashley Fair   Sent: 4/5/2019 8:16 PM CDT  Subject: Other    I tried to submit a request to get a refill on pantoprazole 40 mg, but it states cannot refill. Can you call in a refill to [facility] on [facility].

## 2019-04-17 ENCOUNTER — TELEPHONE (OUTPATIENT)
Dept: FAMILY MEDICINE CLINIC | Facility: CLINIC | Age: 54
End: 2019-04-17

## 2019-04-17 NOTE — TELEPHONE ENCOUNTER
----- Message from Trevor Davis RN sent at 4/9/2019  1:54 PM CDT -----  Regarding: Quality  Due for pap

## 2019-05-07 RX ORDER — METOPROLOL SUCCINATE 100 MG/1
TABLET, EXTENDED RELEASE ORAL
Qty: 90 TABLET | Refills: 3 | Status: SHIPPED | OUTPATIENT
Start: 2019-05-07 | End: 2019-10-08

## 2019-05-07 NOTE — TELEPHONE ENCOUNTER
LOV: 12/13/18   Last Refill: 4/30/18  #90 3 RF      Last BP: 130/78  4/5/19  Future Appointments   Date Time Provider Matt Rey   5/8/2019  3:30 PM SP [facility] Wilfredo Nap

## 2019-09-03 ENCOUNTER — PATIENT MESSAGE (OUTPATIENT)
Dept: FAMILY MEDICINE CLINIC | Facility: CLINIC | Age: 54
End: 2019-09-03

## 2019-09-03 RX ORDER — PREDNISONE 10 MG/1
TABLET ORAL
Qty: 30 TABLET | Refills: 0 | Status: SHIPPED | OUTPATIENT
Start: 2019-09-03 | End: 2019-09-14 | Stop reason: ALTCHOICE

## 2019-09-03 RX ORDER — PREDNISONE 10 MG/1
TABLET ORAL
Qty: 30 TABLET | Refills: 0 | Status: SHIPPED | OUTPATIENT
Start: 2019-09-03 | End: 2019-09-03

## 2019-09-03 NOTE — TELEPHONE ENCOUNTER
From: Guero Abel  To: Danni Owen DO  Sent: 9/3/2019 9:17 AM CDT  Subject: Non-Urgent Medical Question    This is poison ivy should I get prednisone? If yes use [facility] on [address], Will that I can get at lunch.   Thanks, [employer]

## 2019-09-03 NOTE — TELEPHONE ENCOUNTER
From: Khadra Jason  To: Sony Buck DO  Sent: 9/3/2019 9:58 AM CDT  Subject: Non-Urgent Medical Question    It is poison ivy. It starts off as a pimple that burns and itches at the same time. I tried using calamine lotion to dry it out.  With my sensit

## 2019-09-14 ENCOUNTER — OFFICE VISIT (OUTPATIENT)
Dept: FAMILY MEDICINE CLINIC | Facility: CLINIC | Age: 54
End: 2019-09-14

## 2019-09-14 VITALS
SYSTOLIC BLOOD PRESSURE: 120 MMHG | HEIGHT: 64 IN | RESPIRATION RATE: 18 BRPM | HEART RATE: 78 BPM | DIASTOLIC BLOOD PRESSURE: 84 MMHG | BODY MASS INDEX: 38.07 KG/M2 | TEMPERATURE: 98 F | OXYGEN SATURATION: 98 % | WEIGHT: 223 LBS

## 2019-09-14 DIAGNOSIS — L08.9: Primary | ICD-10-CM

## 2019-09-14 DIAGNOSIS — S20.369A: Primary | ICD-10-CM

## 2019-09-14 DIAGNOSIS — Z23 NEED FOR VACCINATION: ICD-10-CM

## 2019-09-14 PROCEDURE — 90686 IIV4 VACC NO PRSV 0.5 ML IM: CPT | Performed by: PHYSICIAN ASSISTANT

## 2019-09-14 PROCEDURE — 90471 IMMUNIZATION ADMIN: CPT | Performed by: PHYSICIAN ASSISTANT

## 2019-09-14 PROCEDURE — 99213 OFFICE O/P EST LOW 20 MIN: CPT | Performed by: PHYSICIAN ASSISTANT

## 2019-09-14 RX ORDER — SULFAMETHOXAZOLE AND TRIMETHOPRIM 800; 160 MG/1; MG/1
1 TABLET ORAL 2 TIMES DAILY
Qty: 14 TABLET | Refills: 0 | Status: SHIPPED | OUTPATIENT
Start: 2019-09-14 | End: 2019-09-21

## 2019-09-14 NOTE — PATIENT INSTRUCTIONS
Please follow up with your PCP if no improvement within 5-7 days. Go directly to the ER for any acute worsening of symptoms.    Wash area with soap and water; warm moist soaks to area four times daily  Complete entire course of antibiotics  Ibuprofen or ace drowsiness and are a good choice for daytime use. · If oral antibiotics have been prescribed, be sure to take them as directed until they are all finished.   · You may use over-the-counter pain medicine to control pain, unless another pain medicine was pre

## 2019-09-14 NOTE — PROGRESS NOTES
CHIEF COMPLAINT:   Patient presents with:  Bite Sting,Insect (integumentary): on chest x 5 days     HPI:     Leslie Roy is a 53year old female who presents with concerns of possible infected insect bite. Patient first noticed symptoms 5 days ago.   Rep MCG/ACT Nasal Suspension 2 sprays by Each Nare route daily.  Disp: 1 Bottle Rfl: 3      Past Medical History:   Diagnosis Date   • BBB (bundle branch block)     right   • GERD (gastroesophageal reflux disease)    • Hepatic hemangioma    • Obesity, unspecifi cyanosis, clubbing or edema. Cap refill brisk- less than 2 seconds. LYMPH: no lymphadenopathy.       ASSESSMENT AND PLAN:     ASSESSMENT: Infected insect bite of chest, initial encounter  (primary encounter diagnosis)    PLAN: Skin care discussed with pa Home care  The following will help you care for your bite or sting at home:  · If a stinger is still in your skin, it will need to be removed. Don't use tweezers.  Gently scrape the stinger from the side with a firm object such as the side of a Grows Up car the area starts feeling \"squishy\" like a water ballon. · Headache, fever, chills, muscle or joint aching, or vomiting,  · New rash  Date Last Reviewed: 10/1/2016  © 3101-3483 [employer]. [address] [facility], [facility].  All righ

## 2019-10-18 ENCOUNTER — OFFICE VISIT (OUTPATIENT)
Dept: FAMILY MEDICINE CLINIC | Facility: CLINIC | Age: 54
End: 2019-10-18

## 2019-10-18 VITALS
TEMPERATURE: 98 F | BODY MASS INDEX: 38.41 KG/M2 | SYSTOLIC BLOOD PRESSURE: 124 MMHG | OXYGEN SATURATION: 98 % | WEIGHT: 225 LBS | HEIGHT: 64 IN | RESPIRATION RATE: 18 BRPM | DIASTOLIC BLOOD PRESSURE: 76 MMHG | HEART RATE: 76 BPM

## 2019-10-18 DIAGNOSIS — B02.9 HERPES ZOSTER WITHOUT COMPLICATION: Primary | ICD-10-CM

## 2019-10-18 PROCEDURE — 99213 OFFICE O/P EST LOW 20 MIN: CPT | Performed by: NURSE PRACTITIONER

## 2019-10-18 RX ORDER — VALACYCLOVIR HYDROCHLORIDE 1 G/1
1 TABLET, FILM COATED ORAL 3 TIMES DAILY
Qty: 21 TABLET | Refills: 0 | Status: SHIPPED | OUTPATIENT
Start: 2019-10-18 | End: 2019-10-25

## 2019-10-18 NOTE — PROGRESS NOTES
CHIEF COMPLAINT:   Patient presents with:  Derm Problem: rash upper left abdomen, itching, redness x 3 days          HPI:    Ana Maria Willoughby is a 53year old female who presents for evaluation of a rash. Per patient rash started in the past 2-3 days.  Rash • Obesity, unspecified    • Polycystic ovaries    • Uterine fibroid       Past Surgical History:   Procedure Laterality Date   • ESOPHAGEAL MANOMETRY N/A 7/18/2017    Performed by Donny Springer MD at [facility] ENDOSCOPY   • HYSTERECTOMY      TAHBSO   • ALEXANDER BI LUNGS: Clear to auscultation bilaterally. No wheezing, rhonchi, or rales. No diminished breath sounds. No increased work of breathing.    CARDIO: RRR without murmur        ASSESSMENT AND PLAN:   Khadra Jason is a 53year old female who presents for eval Shingles vaccines are available. Vaccination can help prevent shingles or make it less painful.  It is generally recommended for adults older than 48, even if you've had singles in the past. Talk with your healthcare provider about when to get vaccinated an The patient indicates understanding of these issues and agrees to the plan. The patient is asked to return if sx's persist or worsen.

## 2019-10-21 ENCOUNTER — TELEPHONE (OUTPATIENT)
Dept: FAMILY MEDICINE CLINIC | Facility: CLINIC | Age: 54
End: 2019-10-21

## 2019-10-21 NOTE — TELEPHONE ENCOUNTER
As long as it's covered she's not contagious? 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  I can give her a note, but it's kind of stupid if it's covered

## 2019-10-21 NOTE — TELEPHONE ENCOUNTER
Pt went to [facility] and was dx with shingles. Pt wants a note stating how long she will be  Contagious.

## 2019-10-21 NOTE — TELEPHONE ENCOUNTER
Pt advised- Pt states she needs letter from DS stating that she is not contagious and can work. Pt states HR is concerned because she has coworkers who have not had chicken Pox.     Please advise when note is ready

## 2019-11-26 ENCOUNTER — TELEPHONE (OUTPATIENT)
Dept: FAMILY MEDICINE CLINIC | Facility: CLINIC | Age: 54
End: 2019-11-26

## 2019-11-26 ENCOUNTER — OFFICE VISIT (OUTPATIENT)
Dept: FAMILY MEDICINE CLINIC | Facility: CLINIC | Age: 54
End: 2019-11-26

## 2019-11-26 VITALS
BODY MASS INDEX: 38 KG/M2 | DIASTOLIC BLOOD PRESSURE: 88 MMHG | TEMPERATURE: 98 F | RESPIRATION RATE: 16 BRPM | WEIGHT: 220.63 LBS | HEART RATE: 88 BPM | SYSTOLIC BLOOD PRESSURE: 130 MMHG

## 2019-11-26 DIAGNOSIS — N81.10 BLADDER PROLAPSE, FEMALE, ACQUIRED: ICD-10-CM

## 2019-11-26 DIAGNOSIS — L82.1 SEBORRHEIC KERATOSIS: ICD-10-CM

## 2019-11-26 DIAGNOSIS — I10 ESSENTIAL HYPERTENSION: ICD-10-CM

## 2019-11-26 DIAGNOSIS — S86.812A STRAIN OF CALF MUSCLE, LEFT, INITIAL ENCOUNTER: ICD-10-CM

## 2019-11-26 DIAGNOSIS — E66.9 OBESITY (BMI 30-39.9): Primary | ICD-10-CM

## 2019-11-26 DIAGNOSIS — R73.9 HYPERGLYCEMIA: ICD-10-CM

## 2019-11-26 DIAGNOSIS — R23.2 HOT FLASHES: ICD-10-CM

## 2019-11-26 PROCEDURE — 99214 OFFICE O/P EST MOD 30 MIN: CPT | Performed by: FAMILY MEDICINE

## 2019-11-26 NOTE — PROGRESS NOTES
Leslie Roy is a 53year old female. HPI:   Judit Alexis has issues with her left calf , she has been walking more, she notes that her her shoes are old, and she is not walking very fast, but up to 3 miles a day, she denies any LE edema, no SOB.  But she also Smoking status: Never Smoker      Smokeless tobacco: Never Used    Alcohol use:  Yes      Alcohol/week: 2.0 standard drinks      Types: 2 Standard drinks or equivalent per week      Comment: occ    Drug use: No       REVIEW OF SYSTEMS:   GENERAL HEALTH: fee issues and agrees to the plan. The patient is asked to return in after she sees gyne.

## 2019-11-26 NOTE — TELEPHONE ENCOUNTER
Pt needs fasting labs before DS will send script    ORders placed- pt states she will go to reference lab to have them done.     PT was provided with number to CS to set up ALEXANDER    Once we have labs back we will send over apporpiate metformin

## 2019-11-27 ENCOUNTER — APPOINTMENT (OUTPATIENT)
Dept: LAB | Age: 54
End: 2019-11-27
Attending: FAMILY MEDICINE
Payer: COMMERCIAL

## 2019-11-27 DIAGNOSIS — E66.9 OBESITY (BMI 30-39.9): ICD-10-CM

## 2019-11-27 DIAGNOSIS — R73.9 HYPERGLYCEMIA: ICD-10-CM

## 2019-11-27 DIAGNOSIS — R23.2 HOT FLASHES: ICD-10-CM

## 2019-11-27 DIAGNOSIS — I10 ESSENTIAL HYPERTENSION: ICD-10-CM

## 2019-11-27 PROCEDURE — 83036 HEMOGLOBIN GLYCOSYLATED A1C: CPT

## 2019-11-27 PROCEDURE — 80053 COMPREHEN METABOLIC PANEL: CPT

## 2019-11-27 PROCEDURE — 84443 ASSAY THYROID STIM HORMONE: CPT

## 2019-11-27 PROCEDURE — 84439 ASSAY OF FREE THYROXINE: CPT

## 2019-11-27 PROCEDURE — 80061 LIPID PANEL: CPT

## 2019-11-29 ENCOUNTER — TELEPHONE (OUTPATIENT)
Dept: FAMILY MEDICINE CLINIC | Facility: CLINIC | Age: 54
End: 2019-11-29

## 2019-11-29 DIAGNOSIS — R74.8 ELEVATED LIVER ENZYMES: Primary | ICD-10-CM

## 2019-11-29 NOTE — TELEPHONE ENCOUNTER
Patient advised. Verbalizes understanding. Future order placed. Patient wants to know if she is to continue Metformin.

## 2019-12-12 ENCOUNTER — TELEPHONE (OUTPATIENT)
Dept: FAMILY MEDICINE CLINIC | Facility: CLINIC | Age: 54
End: 2019-12-12

## 2019-12-12 NOTE — TELEPHONE ENCOUNTER
Dr. Catherne Angelucci received outside results    Per note on results  \" can notify jose that labs look good. Lipids are a smidgen higher. Keep working on diet and exercise.   She will be due to recheck in May 2020\"    Pt advised- verbalized understanding    Labs s negative...

## 2020-01-01 NOTE — TELEPHONE ENCOUNTER
----- Message from Gino Sidhu DO sent at 11/29/2019  6:42 AM CST -----  Can notify Ignacia, her BS kidney thyroid and cholesterol look good, her liver function tests were up a bit, but that is related to her weight, to let’s really focus on our diet, hannah Statement Selected

## 2020-02-21 ENCOUNTER — TELEPHONE (OUTPATIENT)
Dept: FAMILY MEDICINE CLINIC | Facility: CLINIC | Age: 55
End: 2020-02-21

## 2020-02-21 DIAGNOSIS — Z12.39 BREAST CANCER SCREENING: Primary | ICD-10-CM

## 2020-02-21 NOTE — TELEPHONE ENCOUNTER
Joselin Hernandez from [address] [place] [address] called, pt made an appt for a regular mammogram via on line. Pt has had a biopsy previously so Joselin Hernandez said the mammogram requires an order from pt's PCP. Pt also said she wants a 2D 3D mammo.   Please call Joselin Hernandez at [phone]

## 2020-03-09 ENCOUNTER — HOSPITAL ENCOUNTER (OUTPATIENT)
Dept: MAMMOGRAPHY | Age: 55
Discharge: HOME | End: 2020-03-09
Attending: FAMILY MEDICINE
Payer: COMMERCIAL

## 2020-03-09 DIAGNOSIS — Z12.39 BREAST CANCER SCREENING: ICD-10-CM

## 2020-03-09 PROCEDURE — 77067 SCR MAMMO BI INCL CAD: CPT | Performed by: FAMILY MEDICINE

## 2020-03-09 PROCEDURE — 77063 BREAST TOMOSYNTHESIS BI: CPT | Performed by: FAMILY MEDICINE

## 2020-03-20 ENCOUNTER — HOSPITAL ENCOUNTER (OUTPATIENT)
Dept: ULTRASOUND IMAGING | Age: 55
Discharge: HOME | End: 2020-03-20
Attending: FAMILY MEDICINE
Payer: COMMERCIAL

## 2020-03-20 ENCOUNTER — HOSPITAL ENCOUNTER (OUTPATIENT)
Dept: MAMMOGRAPHY | Age: 55
Discharge: HOME | End: 2020-03-20
Attending: FAMILY MEDICINE
Payer: COMMERCIAL

## 2020-03-20 DIAGNOSIS — R92.2 INCONCLUSIVE MAMMOGRAM: ICD-10-CM

## 2020-03-20 PROCEDURE — 77061 BREAST TOMOSYNTHESIS UNI: CPT | Performed by: FAMILY MEDICINE

## 2020-03-20 PROCEDURE — 76642 ULTRASOUND BREAST LIMITED: CPT | Performed by: FAMILY MEDICINE

## 2020-03-20 PROCEDURE — 77065 DX MAMMO INCL CAD UNI: CPT | Performed by: FAMILY MEDICINE

## 2020-04-14 ENCOUNTER — OFFICE VISIT (OUTPATIENT)
Dept: OBGYN CLINIC | Facility: CLINIC | Age: 55
End: 2020-04-14

## 2020-04-14 VITALS
HEIGHT: 64 IN | WEIGHT: 220 LBS | BODY MASS INDEX: 37.56 KG/M2 | SYSTOLIC BLOOD PRESSURE: 134 MMHG | DIASTOLIC BLOOD PRESSURE: 80 MMHG | TEMPERATURE: 99 F

## 2020-04-14 DIAGNOSIS — R35.0 URINARY FREQUENCY: Primary | ICD-10-CM

## 2020-04-14 DIAGNOSIS — Z90.722 S/P TAH-BSO (TOTAL ABDOMINAL HYSTERECTOMY AND BILATERAL SALPINGO-OOPHORECTOMY): ICD-10-CM

## 2020-04-14 DIAGNOSIS — R39.15 URINARY URGENCY: ICD-10-CM

## 2020-04-14 DIAGNOSIS — N81.11 CYSTOCELE, MIDLINE: ICD-10-CM

## 2020-04-14 DIAGNOSIS — Z90.710 S/P TAH-BSO (TOTAL ABDOMINAL HYSTERECTOMY AND BILATERAL SALPINGO-OOPHORECTOMY): ICD-10-CM

## 2020-04-14 DIAGNOSIS — N39.3 SUI (STRESS URINARY INCONTINENCE, FEMALE): ICD-10-CM

## 2020-04-14 DIAGNOSIS — Z90.79 S/P TAH-BSO (TOTAL ABDOMINAL HYSTERECTOMY AND BILATERAL SALPINGO-OOPHORECTOMY): ICD-10-CM

## 2020-04-14 PROCEDURE — 99203 OFFICE O/P NEW LOW 30 MIN: CPT | Performed by: OBSTETRICS & GYNECOLOGY

## 2020-04-14 NOTE — PROGRESS NOTES
Paul Bowie is a 63year old female. HPI:   Patient presents with:  Bladder Problem: Incontinence ongoing issue x20 years       Had marcelino/bso in 2009 for uterine \"issues\"    Has had snow for many years. Also has urgency and frequency.   No bowel issues tablet 5   • naproxen 500 MG Oral Tab Take 500 mg by mouth daily as needed. Aleve      • Budesonide-Formoterol Fumarate (SYMBICORT) 160-4.5 MCG/ACT Inhalation Aerosol Inhale 2 puffs into the lungs 2 (two) times daily.  (Patient not taking: Reported on 4/14/

## 2020-04-27 RX ORDER — HYDROCHLOROTHIAZIDE 25 MG/1
TABLET ORAL
Qty: 30 TABLET | Refills: 5 | Status: SHIPPED | OUTPATIENT
Start: 2020-04-27 | End: 2021-03-20

## 2020-04-27 NOTE — TELEPHONE ENCOUNTER
Hypertension Medications Protocol Passed4/24 6:23 PM   CMP or BMP in past 12 months    Last serum creatinine< 2.0    Appointment in past 6 or next 3 months       Last refilled on 11/20/18 for # 30 with 5 rf. Last labs 12/10/19. Last seen on 11/26/19.    B

## 2020-05-11 ENCOUNTER — TELEPHONE (OUTPATIENT)
Dept: OBGYN CLINIC | Facility: CLINIC | Age: 55
End: 2020-05-11

## 2020-05-11 NOTE — TELEPHONE ENCOUNTER
Dr. Mohit George office is calling on Referral    Needs to state seeing at [facility]    Please advise    [phone]  [phone]  Fax

## 2020-05-11 NOTE — TELEPHONE ENCOUNTER
Call to [facility]/Michel; spoke with Rhea. They are unable to authorize for the [facility] location. Patient needs to go to the [facility] location; this has already been authorized. Call to [facility]; unable to connect with anyone at the office.  Will try again l

## 2020-05-11 NOTE — TELEPHONE ENCOUNTER
Contacted Gina. Let her know that [facility] is saying that they can only authorize for the [facility] location. She states understanding and will call the patient.

## 2020-05-27 RX ORDER — METOPROLOL SUCCINATE 100 MG/1
TABLET, EXTENDED RELEASE ORAL
Qty: 90 TABLET | Refills: 1 | Status: SHIPPED | OUTPATIENT
Start: 2020-05-27 | End: 2020-12-15

## 2020-05-27 NOTE — TELEPHONE ENCOUNTER
Hypertension Medications Protocol Failed5/27 3:23 PM   Appointment in past 6 or next 3 months    CMP or BMP in past 12 months    Last serum creatinine< 2.0     Last refilled 10/8/19  LOV 11/26/19  No future appt  Last CMP 11/27/19  Creatinine (1.12)  Routed to PCP to advise

## 2020-06-02 ENCOUNTER — OFFICE VISIT (OUTPATIENT)
Dept: UROLOGY | Facility: HOSPITAL | Age: 55
End: 2020-06-02
Attending: OBSTETRICS & GYNECOLOGY
Payer: COMMERCIAL

## 2020-06-02 VITALS — WEIGHT: 220 LBS | TEMPERATURE: 98 F | BODY MASS INDEX: 37.56 KG/M2 | RESPIRATION RATE: 16 BRPM | HEIGHT: 64 IN

## 2020-06-02 DIAGNOSIS — N81.84 PELVIC MUSCLE WASTING: ICD-10-CM

## 2020-06-02 DIAGNOSIS — N95.2 POSTMENOPAUSAL ATROPHIC VAGINITIS: ICD-10-CM

## 2020-06-02 DIAGNOSIS — N39.3 FEMALE STRESS INCONTINENCE: Primary | ICD-10-CM

## 2020-06-02 DIAGNOSIS — N81.11 CYSTOCELE, MIDLINE: ICD-10-CM

## 2020-06-02 DIAGNOSIS — N39.41 URGE INCONTINENCE: ICD-10-CM

## 2020-06-02 PROCEDURE — 99212 OFFICE O/P EST SF 10 MIN: CPT

## 2020-06-02 PROCEDURE — 87086 URINE CULTURE/COLONY COUNT: CPT | Performed by: OBSTETRICS & GYNECOLOGY

## 2020-06-02 PROCEDURE — 81002 URINALYSIS NONAUTO W/O SCOPE: CPT

## 2020-06-02 NOTE — PROGRESS NOTES
Ange Matthew DO  6/2/2020     Referred by Dr. Nano Wei  Pt here with self    Patient presents with:   Incontinence: refererred by Dr. Christin Calvin  Prolapse: Cystocele - patient does not feel a vaginal bulge    hyst for PCOS in 2009    HPI:  +LEONARD  +UU MOUTH DAILY, Disp: 90 tablet, Rfl: 1  HYDROCHLOROTHIAZIDE 25 MG Oral Tab, TAKE 1 TABLET(25 MG) BY MOUTH DAILY, Disp: 30 tablet, Rfl: 5  naproxen 500 MG Oral Tab, Take 500 mg by mouth daily as needed.  Aleve , Disp: , Rfl:   Pantoprazole Sodium 40 MG Oral Ta somewhat hypermobile    Impression/Plan:  (N39.3) Female stress incontinence  (primary encounter diagnosis)  Plan: POCT URINALYSIS DIPSTICK, URINE CULTURE,         ROUTINE    (N39.41) Urge incontinence    (N81.11) Cystocele, midline    (N95.2) Postmenopaus

## 2020-06-16 ENCOUNTER — OFFICE VISIT (OUTPATIENT)
Dept: UROLOGY | Facility: HOSPITAL | Age: 55
End: 2020-06-16
Attending: OBSTETRICS & GYNECOLOGY
Payer: COMMERCIAL

## 2020-06-16 VITALS — HEIGHT: 64 IN | BODY MASS INDEX: 37.56 KG/M2 | WEIGHT: 220 LBS | RESPIRATION RATE: 20 BRPM

## 2020-06-16 DIAGNOSIS — N39.41 URGE INCONTINENCE: ICD-10-CM

## 2020-06-16 DIAGNOSIS — N39.3 FEMALE STRESS INCONTINENCE: Primary | ICD-10-CM

## 2020-06-16 DIAGNOSIS — N81.11 CYSTOCELE, MIDLINE: ICD-10-CM

## 2020-06-16 PROCEDURE — 51784 ANAL/URINARY MUSCLE STUDY: CPT

## 2020-06-16 PROCEDURE — 51741 ELECTRO-UROFLOWMETRY FIRST: CPT

## 2020-06-16 PROCEDURE — 51797 INTRAABDOMINAL PRESSURE TEST: CPT

## 2020-06-16 PROCEDURE — 51729 CYSTOMETROGRAM W/VP&UP: CPT

## 2020-06-16 NOTE — PATIENT INSTRUCTIONS
[address]  [place] [address]  [facility]: [phone]  FAX: [phone]       Urodynamic Testing Discharge Instructions: There are NO dietary or activity restrictions.   You may resume

## 2020-06-16 NOTE — PROCEDURES
Patient here for urodynamic testing. Procedure explained and confirmed by patient. See evaluation form for results. Both verbal and written discharge instructions were given.   Patient tolerated procedure well and will follow up with Dr. Leslie Lofton Infusion:  Water Rate 50 mL/min  Temp:  Room  Position:  [x]  Sit  []  Stand  []  Supine  First sensation:   12 mL  First desire to void:   66 mL  Strong desire to void:  187 mL  Maximum cystometric capacity:   307 mL  Detrusor Activity:  []  Unstable

## 2020-06-23 ENCOUNTER — OFFICE VISIT (OUTPATIENT)
Dept: UROLOGY | Facility: HOSPITAL | Age: 55
End: 2020-06-23
Attending: OBSTETRICS & GYNECOLOGY
Payer: COMMERCIAL

## 2020-06-23 VITALS
BODY MASS INDEX: 37.56 KG/M2 | HEIGHT: 64 IN | DIASTOLIC BLOOD PRESSURE: 82 MMHG | SYSTOLIC BLOOD PRESSURE: 126 MMHG | WEIGHT: 220 LBS

## 2020-06-23 DIAGNOSIS — N81.84 PELVIC MUSCLE WASTING: ICD-10-CM

## 2020-06-23 DIAGNOSIS — N39.3 FEMALE STRESS INCONTINENCE: Primary | ICD-10-CM

## 2020-06-23 PROCEDURE — 99212 OFFICE O/P EST SF 10 MIN: CPT

## 2020-06-23 NOTE — PROGRESS NOTES
Pt presents w/ initial c/o UI  Urodynamic testing undergone without complication.   Results reviewed with patient  258/30cc & 229/57cc  [facility] 307cc  No DO  LEONARD @ 100cc    Discussed with patient mgmt options for LEONARD (UUI)  Denies vag bulge sx    Discussed diet

## 2020-07-21 ENCOUNTER — OFFICE VISIT (OUTPATIENT)
Dept: PHYSICAL THERAPY | Age: 55
End: 2020-07-21
Attending: OBSTETRICS & GYNECOLOGY
Payer: COMMERCIAL

## 2020-07-21 DIAGNOSIS — N81.84 PELVIC MUSCLE WASTING: ICD-10-CM

## 2020-07-21 DIAGNOSIS — N39.3 FEMALE STRESS INCONTINENCE: ICD-10-CM

## 2020-07-21 PROCEDURE — 97161 PT EVAL LOW COMPLEX 20 MIN: CPT

## 2020-07-21 PROCEDURE — 97110 THERAPEUTIC EXERCISES: CPT

## 2020-07-21 NOTE — PROGRESS NOTES
MUSCULOSKELETAL AND PELVIC FLOOR EVALUATION:   Referring Physician: Dr. Stephan Nicolas  Diagnosis: Female stress incontinence (N39.3)  Pelvic muscle wasting (N81.84)    Date of Service: 7/21/2020     PATIENT SUMMARY   Ben Adams is a 63year old female symptoms: stress incontinence and urge incontinence  Events associated with the onset of urinary complaints: cough, laugh, sneeze, walking, urge  Abdominal/Vaginal Pressure complaints: none  Urinary Frequency: to complete bladder diary.  1-2 times a night strength: (PERF= Power/Endurance/Reps) MMT: 4/5/5  Accessory Muscle Use: gluteals    Tissue Laxity Test:  Anterior Wall: Min  Posterior Wall: Min  Apical: Min      Internal Palpation: WNL except B levator ani    Today's Treatment and Response:   Patient ed soon as possible to [phone].  If you have any questions, please contact me at Dept: [phone]    Sincerely,  Electronically signed by therapist: Paul Gloria PT  [de-identified] certification required: Yes  I certify the need for these services furnish

## 2020-07-27 ENCOUNTER — TELEPHONE (OUTPATIENT)
Dept: PHYSICAL THERAPY | Age: 55
End: 2020-07-27

## 2020-07-28 ENCOUNTER — OFFICE VISIT (OUTPATIENT)
Dept: PHYSICAL THERAPY | Age: 55
End: 2020-07-28
Attending: OBSTETRICS & GYNECOLOGY
Payer: COMMERCIAL

## 2020-07-28 PROCEDURE — 97112 NEUROMUSCULAR REEDUCATION: CPT

## 2020-07-28 PROCEDURE — 97110 THERAPEUTIC EXERCISES: CPT

## 2020-07-28 NOTE — PROGRESS NOTES
Dx:  Female stress incontinence (N39.3)  Pelvic muscle wasting (N81.84)            Insurance (Authorized # of Visits):  8 recommended           Authorizing Physician: Dr. Marissa Davis  Next MD visit: none scheduled  Fall Risk: standard         Precautions: se fr[place]    Charges: Ex, NMRED 2       Total Timed Treatment: 45 min  Total Treatment Time: 45 min     MUSCULOSKELETAL AND PELVIC FLOOR EVALUATION:   Referring Physician: Dr. Yamilka Ugalde  Diagnosis: Female stress incontinence (N39.3)  Pelvic muscle wasting (N81. GERD    Precautions:  Drug Allergy    URINARY HABITS    Void 330 and 500 1-2 x night  Types of symptoms: stress incontinence and urge incontinence  Events associated with the onset of urinary complaints: cough, laugh, sneeze, walking, urge  Abdominal/Vagin bilaterally  Anal [place]: normal bilaterally    Internal Examination   Scar: NA    Pelvic Floor Muscle strength: (PERF= Power/Endurance/Reps) MMT: 4/5/5  Accessory Muscle Use: gluteals    Tissue Laxity Test:  Anterior Wall: Min  Posterior Wall: Min  Apical: of care. Thank you for your referral. Please co-sign or sign and return this letter via fax as soon as possible to [phone].  If you have any questions, please contact me at Dept: [phone]    Sincerely,  Electronically signed by therapist: Anthony Mitchell

## 2020-07-29 ENCOUNTER — TELEPHONE (OUTPATIENT)
Dept: FAMILY MEDICINE CLINIC | Facility: CLINIC | Age: 55
End: 2020-07-29

## 2020-07-30 ENCOUNTER — OFFICE VISIT (OUTPATIENT)
Dept: PHYSICAL THERAPY | Age: 55
End: 2020-07-30
Attending: OBSTETRICS & GYNECOLOGY
Payer: COMMERCIAL

## 2020-07-30 PROCEDURE — 97112 NEUROMUSCULAR REEDUCATION: CPT

## 2020-07-30 NOTE — PROGRESS NOTES
Dx:  Female stress incontinence (N39.3)  Pelvic muscle wasting (N81.84)            Insurance (Authorized # of Visits):  8 recommended           Authorizing Physician: Dr. Emerson Falk MD visit: none scheduled  Fall Risk: standard         Precautions: se bathroom  Patient will be able to report no leaking with cough or sneeze majority of the time. Patient will be able to report no leaking with laughing.       Plan:  Continue PT to progress functional pelvic floor training and urge deferment exercises  Livan pain. Hx of dyspareunia, but pt does not have partner.  passed away 3 years ago    Pregnant Now: No  Obstetrical/Gynecological history:  c section 1992, normal birth 55, cervix had ripped with last son. Laproscopic procedures due to cysts.  Stopped understanding and performs HEP correctly without reported pain. Skilled Pelvic Physical Therapy is medically necessary to address the above impairments and reach functional goals.  Patient will benefit from therapy to receive  neuromuscular re-education for urgency. Patient will be able to walk 1.0 mile for exercise without needing to stop for the bathroom  Patient will be able to report no leaking with cough or sneeze majority of the time. Patient will be able to report no leaking with laughing.     Rj Borges

## 2020-08-06 ENCOUNTER — OFFICE VISIT (OUTPATIENT)
Dept: PHYSICAL THERAPY | Age: 55
End: 2020-08-06
Attending: OBSTETRICS & GYNECOLOGY
Payer: COMMERCIAL

## 2020-08-06 PROCEDURE — 97110 THERAPEUTIC EXERCISES: CPT

## 2020-08-06 NOTE — PROGRESS NOTES
Dx:  Female stress incontinence (N39.3)  Pelvic muscle wasting (N81.84)            Insurance (Authorized # of Visits):  8 recommended           Authorizing Physician: Dr. Salazar Remy  Next MD visit: none scheduled  Fall Risk: standard         Precautions: se alternating  Double arms overhead x 10  Sit to stand from regular chair or modified elevated chair for OA x 10 Toe taps  Side stepping  Re-evaluate    Pelvic floor facilitation:  Hip add france 10 sec x 20  Hip ER supine green 2 x 10  Review urge deferment H Polycystic ovarian syndrome   Urodynamic Test: 6/17/2020 -t report leaking more since. See chart for results      Beauty Barbara describes prior level of function leaking that was less frequent.  Pt goals include to be able to exercise for good health without leaki exercise for stretching and functional strengthening. Pt will receive education on bladder habits and urge deferment exercises.        OBJECTIVE:       Informed consent for internal pelvic evaluation given: Yes    External Observation:   Voluntary contracti total of 8 visits over a 90 day period.   Treatment will include: Neuromuscular Re-education, Therapeutic Exercise and Home Exercise Program instruction, urge deferment exercises    Education or treatment limitation: None  Rehab Potential:good      Patient/

## 2020-08-13 ENCOUNTER — OFFICE VISIT (OUTPATIENT)
Dept: PHYSICAL THERAPY | Age: 55
End: 2020-08-13
Attending: OBSTETRICS & GYNECOLOGY
Payer: COMMERCIAL

## 2020-08-13 PROCEDURE — 97112 NEUROMUSCULAR REEDUCATION: CPT

## 2020-08-13 NOTE — PROGRESS NOTES
Dx:  Female stress incontinence (N39.3)  Pelvic muscle wasting (N81.84)            Insurance (Authorized # of Visits):  8 recommended           Authorizing Physician: Dr. Maricarmen Duarte  Next MD visit: none scheduled  Fall Risk: standard         Precautions: se exercises PF pre-activation :  BKFO x 10 alternating  Table top x 10 alternating  Double arms overhead x 10  Sit to stand from regular chair or modified elevated chair for OA x 10 NMRED with surface EMG  PF pre-activation isometric 10 sec x 10  PF pre-acti include: urgency/frequency and leakage  No reports of pain. Hx of dyspareunia, but pt does not have partner.   passed away 3 years ago    Pregnant Now: No  Obstetrical/Gynecological history:  c section 1992, normal birth 55, cervix had ripped with evaluation findings, pathology, POC and HEP. Pt voiced understanding and performs HEP correctly without reported pain. Skilled Pelvic Physical Therapy is medically necessary to address the above impairments and reach functional goals.  Patient will benefit independent in appropriate bladder habits to reduce UI and urgency. Patient will be able to walk 1.0 mile for exercise without needing to stop for the bathroom  Patient will be able to report no leaking with cough or sneeze majority of the time.    Jose Raul

## 2020-08-20 ENCOUNTER — OFFICE VISIT (OUTPATIENT)
Dept: PHYSICAL THERAPY | Age: 55
End: 2020-08-20
Attending: OBSTETRICS & GYNECOLOGY
Payer: COMMERCIAL

## 2020-08-20 PROCEDURE — 97112 NEUROMUSCULAR REEDUCATION: CPT

## 2020-08-20 NOTE — PROGRESS NOTES
Dx:  Female stress incontinence (N39.3)  Pelvic muscle wasting (N81.84)            Insurance (Authorized # of Visits):  8 recommended           Authorizing Physician: Dr. Governor Langston  Next MD visit: 9/22/2020  Fall Risk: standard         Precautions: severe Plan:  Continue PT to progress functional pelvic floor training and urge deferment exercises  Date: 7/28/2020  TX#: 2/8 Date:    7/30/2020              TX#: 3/8 Date: 8/7/2020                 TX#: 4/ Date:  8/13/2020                TX#: 5/ Date: 8/20/2020 Peyman Huntley is a 63year old female  who presents to therapy today with complaints of UI leaking with urgency, laughing, exercises (walking no more than 1 mile,) sit to stand, sneezing, coughing.  UI problems started 24 years ago after birth of second ch Urinary Frequency: to complete bladder diary. 1-2 times a night  Urine Stream: Not significant  Amount: small/not wearing bad  Do you ever leak urine without knowing it? no    BOWEL HABITS  Normal habits. Hx of colon CA in family. Pt has had polyps.  Taking Internal Palpation: WNL except B levator ani    Today's Treatment and Response:   Patient education was provided on objective findings of external and internal evaluation and expectations with treatment outcomes.  Educated on pelvic anatomy and function wit Electronically signed by therapist: Carmen Hart, PT  [de-identified] certification required: Yes  I certify the need for these services furnished under this plan of treatment and while under my care.     X___________________________________________________ Mingoita Para

## 2020-08-27 ENCOUNTER — TELEPHONE (OUTPATIENT)
Dept: PHYSICAL THERAPY | Age: 55
End: 2020-08-27

## 2020-08-27 ENCOUNTER — APPOINTMENT (OUTPATIENT)
Dept: PHYSICAL THERAPY | Age: 55
End: 2020-08-27
Attending: OBSTETRICS & GYNECOLOGY
Payer: COMMERCIAL

## 2020-09-03 ENCOUNTER — OFFICE VISIT (OUTPATIENT)
Dept: PHYSICAL THERAPY | Age: 55
End: 2020-09-03
Attending: OBSTETRICS & GYNECOLOGY
Payer: COMMERCIAL

## 2020-09-03 PROCEDURE — 97110 THERAPEUTIC EXERCISES: CPT

## 2020-09-03 NOTE — PROGRESS NOTES
Discharge Summary      Pt has attended 7, cancelled 1, and no shown 0 visits in Physical Therapy. Subjective: Patient report no leaking with sneezing or coughing.  She does not have leaking with regular laughing but has not had an opportunity to test I certify the need for these services furnished under this plan of treatment and while under my care.     X___________________________________________________ Date____________________    Certification From: 8/2/8871  To:12/2/2020      Date: 7/28/2020  TX#: Referring Physician: Dr. Yoselin Garcia  Diagnosis: Female stress incontinence (N39.3)  Pelvic muscle wasting (N81.84)    Date of Service: 7/21/2020     PATIENT SUMMARY   Kevin Degroot is a 63year old female  who presents to therapy today with complaints of U Events associated with the onset of urinary complaints: cough, laugh, sneeze, walking, urge  Abdominal/Vaginal Pressure complaints: none  Urinary Frequency: to complete bladder diary.  1-2 times a night  Urine Stream: Not significant  Amount: small/not wear Accessory Muscle Use: gluteals    Tissue Laxity Test:  Anterior Wall: Min  Posterior Wall: Min  Apical: Min      Internal Palpation: WNL except B levator ani    Today's Treatment and Response:   Patient education was provided on objective findings of exter Thank you for your referral. Please co-sign or sign and return this letter via fax as soon as possible to [phone].  If you have any questions, please contact me at Dept: [phone]    Sincerely,  Electronically signed by therapist: Marcellus Arnold PT

## 2020-09-10 ENCOUNTER — APPOINTMENT (OUTPATIENT)
Dept: PHYSICAL THERAPY | Age: 55
End: 2020-09-10
Attending: OBSTETRICS & GYNECOLOGY
Payer: COMMERCIAL

## 2020-09-22 ENCOUNTER — OFFICE VISIT (OUTPATIENT)
Dept: UROLOGY | Facility: HOSPITAL | Age: 55
End: 2020-09-22
Attending: OBSTETRICS & GYNECOLOGY
Payer: COMMERCIAL

## 2020-09-22 VITALS — WEIGHT: 220 LBS | HEIGHT: 64 IN | RESPIRATION RATE: 14 BRPM | BODY MASS INDEX: 37.56 KG/M2

## 2020-09-22 DIAGNOSIS — N81.11 CYSTOCELE, MIDLINE: ICD-10-CM

## 2020-09-22 DIAGNOSIS — N39.41 URGE INCONTINENCE: ICD-10-CM

## 2020-09-22 DIAGNOSIS — N95.2 POSTMENOPAUSAL ATROPHIC VAGINITIS: ICD-10-CM

## 2020-09-22 DIAGNOSIS — N39.3 FEMALE STRESS INCONTINENCE: Primary | ICD-10-CM

## 2020-09-22 DIAGNOSIS — N81.84 PELVIC MUSCLE WASTING: ICD-10-CM

## 2020-09-22 PROCEDURE — 99212 OFFICE O/P EST SF 10 MIN: CPT

## 2020-09-22 NOTE — PROGRESS NOTES
Patient presents to follow up UI (denies bulge sx)    She is currently using pelvic floor PT  Bladder diet/drill    She reports 40% improvement with LEONARD   Some LEONARD persists with sneezing  improvements with walking especially    UUI only 10% improved    Hop

## 2020-10-13 ENCOUNTER — OFFICE VISIT (OUTPATIENT)
Dept: FAMILY MEDICINE CLINIC | Facility: CLINIC | Age: 55
End: 2020-10-13

## 2020-10-13 ENCOUNTER — LABORATORY ENCOUNTER (OUTPATIENT)
Dept: LAB | Age: 55
End: 2020-10-13
Attending: FAMILY MEDICINE
Payer: COMMERCIAL

## 2020-10-13 VITALS
WEIGHT: 229.19 LBS | RESPIRATION RATE: 16 BRPM | HEART RATE: 92 BPM | SYSTOLIC BLOOD PRESSURE: 120 MMHG | TEMPERATURE: 99 F | DIASTOLIC BLOOD PRESSURE: 86 MMHG | BODY MASS INDEX: 39 KG/M2

## 2020-10-13 DIAGNOSIS — Z01.818 PREOP EXAMINATION: Primary | ICD-10-CM

## 2020-10-13 DIAGNOSIS — N39.3 STRESS INCONTINENCE, FEMALE: ICD-10-CM

## 2020-10-13 DIAGNOSIS — E66.9 OBESITY (BMI 30-39.9): ICD-10-CM

## 2020-10-13 DIAGNOSIS — J45.990 EXERCISE-INDUCED ASTHMA: ICD-10-CM

## 2020-10-13 DIAGNOSIS — J30.1 ALLERGIC RHINITIS DUE TO POLLEN: ICD-10-CM

## 2020-10-13 DIAGNOSIS — Z12.11 ENCOUNTER FOR SCREENING COLONOSCOPY: ICD-10-CM

## 2020-10-13 DIAGNOSIS — Z00.8 ENCOUNTER FOR MEDICAL CLEARANCE FOR PATIENT HOLD: Primary | ICD-10-CM

## 2020-10-13 PROCEDURE — 3074F SYST BP LT 130 MM HG: CPT | Performed by: FAMILY MEDICINE

## 2020-10-13 PROCEDURE — 93000 ELECTROCARDIOGRAM COMPLETE: CPT | Performed by: FAMILY MEDICINE

## 2020-10-13 PROCEDURE — 99244 OFF/OP CNSLTJ NEW/EST MOD 40: CPT | Performed by: FAMILY MEDICINE

## 2020-10-13 PROCEDURE — 90686 IIV4 VACC NO PRSV 0.5 ML IM: CPT | Performed by: FAMILY MEDICINE

## 2020-10-13 PROCEDURE — 90471 IMMUNIZATION ADMIN: CPT | Performed by: FAMILY MEDICINE

## 2020-10-13 PROCEDURE — 3079F DIAST BP 80-89 MM HG: CPT | Performed by: FAMILY MEDICINE

## 2020-10-13 PROCEDURE — 36415 COLL VENOUS BLD VENIPUNCTURE: CPT

## 2020-10-13 PROCEDURE — 85025 COMPLETE CBC W/AUTO DIFF WBC: CPT

## 2020-10-13 RX ORDER — BUDESONIDE AND FORMOTEROL FUMARATE DIHYDRATE 160; 4.5 UG/1; UG/1
2 AEROSOL RESPIRATORY (INHALATION) 2 TIMES DAILY
Qty: 1 INHALER | Refills: 6 | Status: SHIPPED | OUTPATIENT
Start: 2020-10-13 | End: 2020-12-16 | Stop reason: ALTCHOICE

## 2020-10-13 RX ORDER — PANTOPRAZOLE SODIUM 40 MG/1
40 TABLET, DELAYED RELEASE ORAL
Qty: 90 TABLET | Refills: 2 | Status: SHIPPED | OUTPATIENT
Start: 2020-10-13 | End: 2021-06-21

## 2020-10-13 NOTE — PROGRESS NOTES
Ben Adams is a 63year old female who presents for a pre-operative physical exam. Patient is to have [address], to be done by Dr. Maria Fernanda Fam at [facility] on 11/2/20.       HPI:   Pt complains of having issues of • Heart Disorder Father    • Heart Disorder Mother    • Hypertension Mother    • Lipids Mother    • Breast Cancer Paternal Aunt 54        SECOND OCCURRANCE AGE [de-identified]      Social History:   Social History    Tobacco Use      Smoking status: Never Smoker back  EXTREMITIES: no cyanosis, clubbing or edema  NEURO: Oriented times three,cranial nerves are intact,motor and sensory are grossly intact    ASSESSMENT AND PLAN:     Preop examination  (primary encounter diagnosis)  Stress incontinence, female  Encount

## 2020-10-26 ENCOUNTER — PATIENT MESSAGE (OUTPATIENT)
Dept: FAMILY MEDICINE CLINIC | Facility: CLINIC | Age: 55
End: 2020-10-26

## 2020-10-26 DIAGNOSIS — Z01.818 PRE-OP EXAM: Primary | ICD-10-CM

## 2020-10-26 RX ORDER — MELATONIN 10 MG
CAPSULE ORAL AS NEEDED
COMMUNITY

## 2020-10-26 NOTE — TELEPHONE ENCOUNTER
From: Mary Anne Gamble  To: Jennifer Noland DO  Sent: 10/26/2020 3:45 PM CDT  Subject: Other    I just got off the phone with pre-admission from [facility] regarding all of the test. She mentioned that she did not see the BMP results.  Turns out that Dr. Marlyn Rizo

## 2020-10-28 ENCOUNTER — TELEPHONE (OUTPATIENT)
Dept: FAMILY MEDICINE CLINIC | Facility: CLINIC | Age: 55
End: 2020-10-28

## 2020-10-28 ENCOUNTER — LAB ENCOUNTER (OUTPATIENT)
Dept: LAB | Age: 55
End: 2020-10-28
Attending: FAMILY MEDICINE
Payer: COMMERCIAL

## 2020-10-28 DIAGNOSIS — R79.89 ELEVATED LIVER FUNCTION TESTS: Primary | ICD-10-CM

## 2020-10-28 DIAGNOSIS — Z01.818 PRE-OP EXAM: ICD-10-CM

## 2020-10-28 PROCEDURE — 36415 COLL VENOUS BLD VENIPUNCTURE: CPT

## 2020-10-28 PROCEDURE — 80048 BASIC METABOLIC PNL TOTAL CA: CPT

## 2020-10-28 NOTE — TELEPHONE ENCOUNTER
ALEJANDRINA NOGUEIRA PRE ADMIN IS LOOKING FOR EKG TRACINGS.  PT HAVING SURGERY ON 11/2/20    PLEASE FAX [phone]    THANK YOU

## 2020-10-29 ENCOUNTER — TELEPHONE (OUTPATIENT)
Dept: FAMILY MEDICINE CLINIC | Facility: CLINIC | Age: 55
End: 2020-10-29

## 2020-10-29 NOTE — TELEPHONE ENCOUNTER
----- Message from Topher Hyde DO sent at 10/29/2020  7:18 AM CDT -----  Can notify [facility], her liver function tests are  back to normal , we can recall in 6 months

## 2020-10-31 ENCOUNTER — APPOINTMENT (OUTPATIENT)
Dept: LAB | Age: 55
End: 2020-10-31
Attending: OBSTETRICS & GYNECOLOGY
Payer: COMMERCIAL

## 2020-10-31 DIAGNOSIS — N39.3 STRESS INCONTINENCE: ICD-10-CM

## 2020-11-02 ENCOUNTER — ANESTHESIA EVENT (OUTPATIENT)
Dept: SURGERY | Facility: HOSPITAL | Age: 55
End: 2020-11-02
Payer: COMMERCIAL

## 2020-11-02 ENCOUNTER — HOSPITAL ENCOUNTER (OUTPATIENT)
Facility: HOSPITAL | Age: 55
Setting detail: HOSPITAL OUTPATIENT SURGERY
Discharge: HOME | End: 2020-11-02
Attending: OBSTETRICS & GYNECOLOGY | Admitting: OBSTETRICS & GYNECOLOGY
Payer: COMMERCIAL

## 2020-11-02 ENCOUNTER — ANESTHESIA (OUTPATIENT)
Dept: SURGERY | Facility: HOSPITAL | Age: 55
End: 2020-11-02
Payer: COMMERCIAL

## 2020-11-02 VITALS
RESPIRATION RATE: 16 BRPM | OXYGEN SATURATION: 97 % | HEART RATE: 78 BPM | SYSTOLIC BLOOD PRESSURE: 131 MMHG | DIASTOLIC BLOOD PRESSURE: 66 MMHG | BODY MASS INDEX: 38.84 KG/M2 | WEIGHT: 227.5 LBS | TEMPERATURE: 98 F | HEIGHT: 64 IN

## 2020-11-02 DIAGNOSIS — N39.3 STRESS INCONTINENCE: Primary | ICD-10-CM

## 2020-11-02 PROCEDURE — 0TJB8ZZ INSPECTION OF BLADDER, VIA NATURAL OR ARTIFICIAL OPENING ENDOSCOPIC: ICD-10-PCS | Performed by: OBSTETRICS & GYNECOLOGY

## 2020-11-02 PROCEDURE — 0TSD0ZZ REPOSITION URETHRA, OPEN APPROACH: ICD-10-PCS | Performed by: OBSTETRICS & GYNECOLOGY

## 2020-11-02 PROCEDURE — 51702 INSERT TEMP BLADDER CATH: CPT | Performed by: OBSTETRICS & GYNECOLOGY

## 2020-11-02 PROCEDURE — 51798 US URINE CAPACITY MEASURE: CPT | Performed by: OBSTETRICS & GYNECOLOGY

## 2020-11-02 DEVICE — TRANSVAGINAL MID-URETHRAL SLING
Type: IMPLANTABLE DEVICE | Site: URETHRA | Status: FUNCTIONAL
Brand: ADVANTAGE FIT™ BLUE SYSTEM

## 2020-11-02 RX ORDER — ONDANSETRON 2 MG/ML
4 INJECTION INTRAMUSCULAR; INTRAVENOUS AS NEEDED
Status: DISCONTINUED | OUTPATIENT
Start: 2020-11-02 | End: 2020-11-02

## 2020-11-02 RX ORDER — BUPIVACAINE HYDROCHLORIDE AND EPINEPHRINE 2.5; 5 MG/ML; UG/ML
INJECTION, SOLUTION EPIDURAL; INFILTRATION; INTRACAUDAL; PERINEURAL AS NEEDED
Status: DISCONTINUED | OUTPATIENT
Start: 2020-11-02 | End: 2020-11-02 | Stop reason: HOSPADM

## 2020-11-02 RX ORDER — HYDROMORPHONE HYDROCHLORIDE 1 MG/ML
INJECTION, SOLUTION INTRAMUSCULAR; INTRAVENOUS; SUBCUTANEOUS
Status: COMPLETED
Start: 2020-11-02 | End: 2020-11-02

## 2020-11-02 RX ORDER — KETOROLAC TROMETHAMINE 30 MG/ML
INJECTION, SOLUTION INTRAMUSCULAR; INTRAVENOUS AS NEEDED
Status: DISCONTINUED | OUTPATIENT
Start: 2020-11-02 | End: 2020-11-02 | Stop reason: SURG

## 2020-11-02 RX ORDER — LIDOCAINE HYDROCHLORIDE 10 MG/ML
INJECTION, SOLUTION EPIDURAL; INFILTRATION; INTRACAUDAL; PERINEURAL AS NEEDED
Status: DISCONTINUED | OUTPATIENT
Start: 2020-11-02 | End: 2020-11-02 | Stop reason: SURG

## 2020-11-02 RX ORDER — DEXAMETHASONE SODIUM PHOSPHATE 4 MG/ML
VIAL (ML) INJECTION AS NEEDED
Status: DISCONTINUED | OUTPATIENT
Start: 2020-11-02 | End: 2020-11-02 | Stop reason: SURG

## 2020-11-02 RX ORDER — CLINDAMYCIN PHOSPHATE 900 MG/50ML
900 INJECTION INTRAVENOUS ONCE
Status: COMPLETED | OUTPATIENT
Start: 2020-11-02 | End: 2020-11-02

## 2020-11-02 RX ORDER — MIDAZOLAM HYDROCHLORIDE 1 MG/ML
INJECTION INTRAMUSCULAR; INTRAVENOUS AS NEEDED
Status: DISCONTINUED | OUTPATIENT
Start: 2020-11-02 | End: 2020-11-02 | Stop reason: SURG

## 2020-11-02 RX ORDER — HYDROMORPHONE HYDROCHLORIDE 1 MG/ML
0.4 INJECTION, SOLUTION INTRAMUSCULAR; INTRAVENOUS; SUBCUTANEOUS EVERY 5 MIN PRN
Status: DISCONTINUED | OUTPATIENT
Start: 2020-11-02 | End: 2020-11-02

## 2020-11-02 RX ORDER — HYDROCODONE BITARTRATE AND ACETAMINOPHEN 5; 325 MG/1; MG/1
1 TABLET ORAL AS NEEDED
Status: COMPLETED | OUTPATIENT
Start: 2020-11-02 | End: 2020-11-02

## 2020-11-02 RX ORDER — HYDROCODONE BITARTRATE AND ACETAMINOPHEN 5; 325 MG/1; MG/1
1 TABLET ORAL EVERY 4 HOURS PRN
Qty: 12 TABLET | Refills: 0 | Status: SHIPPED | OUTPATIENT
Start: 2020-11-02 | End: 2021-02-09

## 2020-11-02 RX ORDER — SODIUM CHLORIDE, SODIUM LACTATE, POTASSIUM CHLORIDE, CALCIUM CHLORIDE 600; 310; 30; 20 MG/100ML; MG/100ML; MG/100ML; MG/100ML
INJECTION, SOLUTION INTRAVENOUS CONTINUOUS
Status: DISCONTINUED | OUTPATIENT
Start: 2020-11-02 | End: 2020-11-02

## 2020-11-02 RX ORDER — ONDANSETRON 2 MG/ML
INJECTION INTRAMUSCULAR; INTRAVENOUS AS NEEDED
Status: DISCONTINUED | OUTPATIENT
Start: 2020-11-02 | End: 2020-11-02 | Stop reason: SURG

## 2020-11-02 RX ORDER — NALOXONE HYDROCHLORIDE 0.4 MG/ML
80 INJECTION, SOLUTION INTRAMUSCULAR; INTRAVENOUS; SUBCUTANEOUS AS NEEDED
Status: DISCONTINUED | OUTPATIENT
Start: 2020-11-02 | End: 2020-11-02

## 2020-11-02 RX ORDER — IBUPROFEN 600 MG/1
600 TABLET ORAL EVERY 6 HOURS PRN
Status: DISCONTINUED | OUTPATIENT
Start: 2020-11-02 | End: 2020-11-02

## 2020-11-02 RX ORDER — HYDROCODONE BITARTRATE AND ACETAMINOPHEN 5; 325 MG/1; MG/1
2 TABLET ORAL AS NEEDED
Status: COMPLETED | OUTPATIENT
Start: 2020-11-02 | End: 2020-11-02

## 2020-11-02 RX ORDER — IBUPROFEN 600 MG/1
600 TABLET ORAL EVERY 6 HOURS PRN
Qty: 30 TABLET | Refills: 3 | Status: SHIPPED | OUTPATIENT
Start: 2020-11-02

## 2020-11-02 RX ORDER — ACETAMINOPHEN 500 MG
1000 TABLET ORAL ONCE
Status: DISCONTINUED | OUTPATIENT
Start: 2020-11-02 | End: 2020-11-02

## 2020-11-02 RX ADMIN — CLINDAMYCIN PHOSPHATE 900 MG: 900 INJECTION INTRAVENOUS at 12:41:00

## 2020-11-02 RX ADMIN — LIDOCAINE HYDROCHLORIDE 50 MG: 10 INJECTION, SOLUTION EPIDURAL; INFILTRATION; INTRACAUDAL; PERINEURAL at 12:41:00

## 2020-11-02 RX ADMIN — ONDANSETRON 4 MG: 2 INJECTION INTRAMUSCULAR; INTRAVENOUS at 13:00:00

## 2020-11-02 RX ADMIN — DEXAMETHASONE SODIUM PHOSPHATE 4 MG: 4 MG/ML VIAL (ML) INJECTION at 12:47:00

## 2020-11-02 RX ADMIN — SODIUM CHLORIDE, SODIUM LACTATE, POTASSIUM CHLORIDE, CALCIUM CHLORIDE: 600; 310; 30; 20 INJECTION, SOLUTION INTRAVENOUS at 13:12:00

## 2020-11-02 RX ADMIN — KETOROLAC TROMETHAMINE 30 MG: 30 INJECTION, SOLUTION INTRAMUSCULAR; INTRAVENOUS at 13:00:00

## 2020-11-02 RX ADMIN — MIDAZOLAM HYDROCHLORIDE 2 MG: 1 INJECTION INTRAMUSCULAR; INTRAVENOUS at 12:35:00

## 2020-11-02 NOTE — H&P
62 y/o female with LEONARD for surgical mgmt  Pre operative clearance with Dr Joseph Dougherty, pt seen & examined without changes.   Thorough discussion of surgical risks, benefits, and alternatives including, but not limited to bleeding/clots, infection, injury to nearb

## 2020-11-02 NOTE — ANESTHESIA PROCEDURE NOTES
Airway  Date/Time: 11/2/2020 12:42 PM  Urgency: elective    Airway not difficult    General Information and Staff    Patient location during procedure: OR  Anesthesiologist: Christiano Carvalho MD  Resident/CRNA: Constanza Dukes CRNA  Performed: CRNA     Indications

## 2020-11-02 NOTE — ANESTHESIA POSTPROCEDURE EVALUATION
[address] Patient Status:  Hospital Outpatient Surgery   Age/Gender 63year old female MRN [id number]   Location [address] [facility] Attending Abhi Del Castillo DO   Hosp Day # 0 PCP DO Doug Perdue

## 2020-11-02 NOTE — ANESTHESIA PREPROCEDURE EVALUATION
PRE-OP EVALUATION    Patient Name: Mars Reasons    Pre-op Diagnosis: STRESS INCONTIENCE    Procedure(s):  PLACEMENT OF MID-URETHRAL SLING, CYSTOSCOPY    Surgeon(s) and Role:     Nidia Handsome, DO - Primary    Pre-op vitals reviewed.         Body mass 311.0 10/13/2020     Lab Results   Component Value Date     10/28/2020    K 4.1 10/28/2020     10/28/2020    CO2 28.0 10/28/2020    BUN 15 10/28/2020    CREATSERUM 1.04 (H) 10/28/2020    GLU 90 10/28/2020    CA 9.4 10/28/2020            Airway

## 2020-11-02 NOTE — OPERATIVE REPORT
Pre-Operative Diagnosis: Stress Urinary Incontinence    Post-Operative Diagnosis: Same    Procedure Performed: midurethral sling (Advantage Fit); cystoscopy    Surgeon: Boy Mantilla DO    Findings: Bilateral ureteral efflux, no evidence of bladder, ure extubated and transferred from the operating room to the recovery room in stable condition, having tolerated the procedure well. Sponge, lap, & needle counts were correct.

## 2020-11-03 ENCOUNTER — TELEPHONE (OUTPATIENT)
Dept: UROLOGY | Facility: HOSPITAL | Age: 55
End: 2020-11-03

## 2020-11-06 ENCOUNTER — OFFICE VISIT (OUTPATIENT)
Dept: UROLOGY | Facility: HOSPITAL | Age: 55
End: 2020-11-06
Attending: OBSTETRICS & GYNECOLOGY
Payer: COMMERCIAL

## 2020-11-06 VITALS
TEMPERATURE: 99 F | HEIGHT: 64 IN | SYSTOLIC BLOOD PRESSURE: 138 MMHG | WEIGHT: 227 LBS | RESPIRATION RATE: 16 BRPM | BODY MASS INDEX: 38.76 KG/M2 | DIASTOLIC BLOOD PRESSURE: 84 MMHG | HEART RATE: 91 BPM

## 2020-11-06 DIAGNOSIS — R33.9 RETENTION OF URINE: Primary | ICD-10-CM

## 2020-11-06 DIAGNOSIS — Z98.890 POST-OPERATIVE STATE: ICD-10-CM

## 2020-11-06 PROCEDURE — 99212 OFFICE O/P EST SF 10 MIN: CPT

## 2020-11-06 NOTE — PROCEDURES
.Patient here for voiding trial.  Suprapubic sites x 2 clear, intact, brusing noted, especially to right suprapubic region, soft to touch.  Rodriguez catheter drained and then using a 60 cc Shaquille syringe, 250 ml sterile water was instilled per gravity, balloon

## 2020-11-17 ENCOUNTER — OFFICE VISIT (OUTPATIENT)
Dept: UROLOGY | Facility: HOSPITAL | Age: 55
End: 2020-11-17
Attending: OBSTETRICS & GYNECOLOGY
Payer: COMMERCIAL

## 2020-11-17 VITALS — TEMPERATURE: 98 F | DIASTOLIC BLOOD PRESSURE: 88 MMHG | RESPIRATION RATE: 16 BRPM | SYSTOLIC BLOOD PRESSURE: 144 MMHG

## 2020-11-17 DIAGNOSIS — R30.0 DYSURIA: ICD-10-CM

## 2020-11-17 DIAGNOSIS — Z98.890 POST-OPERATIVE STATE: Primary | ICD-10-CM

## 2020-11-17 PROCEDURE — 87086 URINE CULTURE/COLONY COUNT: CPT | Performed by: OBSTETRICS & GYNECOLOGY

## 2020-11-17 PROCEDURE — 99212 OFFICE O/P EST SF 10 MIN: CPT

## 2020-11-17 NOTE — PROGRESS NOTES
She is s/p Post-Op Summary  Procedure Date: 11/02/20  Procedure Name: Cystoscopy; Mid-urethral Sling  Post-Op Symptoms: Patient denies pain, LEONARD, UUI, prolapse symptoms, nausea/vomitting, fevers/chills, bleeding, voiding dysfunction, and defecatory dysfunct

## 2020-11-18 ENCOUNTER — PATIENT MESSAGE (OUTPATIENT)
Dept: FAMILY MEDICINE CLINIC | Facility: CLINIC | Age: 55
End: 2020-11-18

## 2020-11-18 NOTE — TELEPHONE ENCOUNTER
From: Ana Maria Willoughby  To: Muna Caraballo DO  Sent: 11/18/2020 4:00 PM CST  Subject: Referral Request    I have a 3 month follow up appointment with Dr. Tonya Fregoso on 2/9/21. I was advised by her nurse that I will need a referral for this appointment.  Do I ob

## 2020-11-29 ENCOUNTER — APPOINTMENT (OUTPATIENT)
Dept: GENERAL RADIOLOGY | Age: 55
End: 2020-11-29
Attending: PHYSICIAN ASSISTANT
Payer: COMMERCIAL

## 2020-11-29 ENCOUNTER — HOSPITAL ENCOUNTER (EMERGENCY)
Age: 55
Discharge: HOME | End: 2020-11-29
Attending: EMERGENCY MEDICINE
Payer: COMMERCIAL

## 2020-11-29 VITALS
OXYGEN SATURATION: 99 % | SYSTOLIC BLOOD PRESSURE: 174 MMHG | DIASTOLIC BLOOD PRESSURE: 91 MMHG | WEIGHT: 225 LBS | HEIGHT: 64 IN | RESPIRATION RATE: 16 BRPM | TEMPERATURE: 98 F | HEART RATE: 102 BPM | BODY MASS INDEX: 38.41 KG/M2

## 2020-11-29 DIAGNOSIS — T07.XXXA MULTIPLE CONTUSIONS: Primary | ICD-10-CM

## 2020-11-29 DIAGNOSIS — S60.222A CONTUSION OF LEFT HAND, INITIAL ENCOUNTER: ICD-10-CM

## 2020-11-29 DIAGNOSIS — T07.XXXA MULTIPLE ABRASIONS: ICD-10-CM

## 2020-11-29 PROCEDURE — 73130 X-RAY EXAM OF HAND: CPT | Performed by: PHYSICIAN ASSISTANT

## 2020-11-29 PROCEDURE — 99283 EMERGENCY DEPT VISIT LOW MDM: CPT

## 2020-11-29 PROCEDURE — 99284 EMERGENCY DEPT VISIT MOD MDM: CPT

## 2020-11-29 PROCEDURE — 90471 IMMUNIZATION ADMIN: CPT

## 2020-11-30 PROBLEM — S62.347A CLOSED NONDISPLACED FRACTURE OF BASE OF FIFTH METACARPAL BONE OF LEFT HAND, INITIAL ENCOUNTER: Status: ACTIVE | Noted: 2020-11-30

## 2020-11-30 NOTE — ED PROVIDER NOTES
I reviewed that chart and discussed the case. I have examined the patient and noted left upper extremity mild swelling tenderness over left third and fourth metacarpal, no obvious deformity. Mild swelling palmar aspect of the hand.   Able to flex extend a

## 2020-11-30 NOTE — ED PROVIDER NOTES
Patient Seen in: [address] [facility] In [place]      History   Patient presents with:  Arm or Hand Injury    Stated Complaint: LEFT WRIST INJURY - FELL    58-year-old  female without significant past medical history presents to the ER Left hand: She exhibits decreased range of motion, tenderness, bony tenderness and swelling. She exhibits normal two-point discrimination, normal capillary refill, no deformity and no laceration. Normal sensation noted. Normal strength noted.         Ambrocio Slaughter Jorge Luis Nobles, [address] [facility] [address] [facility] [address] [phone]      Follow up on Monday. Call for appointment.           Medications Prescribed:  Current Discharge Medication List

## 2020-11-30 NOTE — ED INITIAL ASSESSMENT (HPI)
STATES \"WALKING UP A CURB AND SURFACE WAS NOT LEVEL AND TRIPPED PAIN TO LEFT HAND/WRIST\"  MULTI AREAS OF ABRASIONS

## 2020-11-30 NOTE — ED INITIAL ASSESSMENT (HPI)
Pt states she was walking on uneven sidewalk and tripped landing on pavement on her hands and knees. Pt c/o left wrist pain. Pt has abrasions to right knee and right hand.  No swelling noted to left wrist. No deformity to left wrist no other trauma noted

## 2020-12-04 ENCOUNTER — OFFICE VISIT (OUTPATIENT)
Dept: FAMILY MEDICINE CLINIC | Facility: CLINIC | Age: 55
End: 2020-12-04

## 2020-12-04 ENCOUNTER — HOSPITAL ENCOUNTER (OUTPATIENT)
Dept: GENERAL RADIOLOGY | Age: 55
Discharge: HOME | End: 2020-12-04
Attending: FAMILY MEDICINE
Payer: COMMERCIAL

## 2020-12-04 VITALS
HEART RATE: 90 BPM | BODY MASS INDEX: 40 KG/M2 | TEMPERATURE: 98 F | SYSTOLIC BLOOD PRESSURE: 150 MMHG | OXYGEN SATURATION: 98 % | RESPIRATION RATE: 17 BRPM | DIASTOLIC BLOOD PRESSURE: 88 MMHG | WEIGHT: 231.38 LBS
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
## 2020-12-04 DIAGNOSIS — S80.01XA CONTUSION OF RIGHT KNEE, INITIAL ENCOUNTER: ICD-10-CM

## 2020-12-04 DIAGNOSIS — S80.01XA CONTUSION OF RIGHT KNEE, INITIAL ENCOUNTER: Primary | ICD-10-CM

## 2020-12-04 PROCEDURE — 3079F DIAST BP 80-89 MM HG: CPT | Performed by: FAMILY MEDICINE

## 2020-12-04 PROCEDURE — 3077F SYST BP >= 140 MM HG: CPT | Performed by: FAMILY MEDICINE

## 2020-12-04 PROCEDURE — 99213 OFFICE O/P EST LOW 20 MIN: CPT | Performed by: FAMILY MEDICINE

## 2020-12-04 PROCEDURE — 73562 X-RAY EXAM OF KNEE 3: CPT | Performed by: FAMILY MEDICINE

## 2020-12-04 NOTE — PATIENT INSTRUCTIONS
R knee XR obtained today  Ace wrapping and changing positions discussed   Tylenol 1 Gm every 8 hours as needed for pain  BP running high today likely due to discomfort   Monitor this closely at home

## 2020-12-04 NOTE — PROGRESS NOTES
[facility] Office Note  Chief Complaint:   Patient presents with:  Knee Pain: per pt- fell on both knees, right one keeps popping in the back, fell sunday night      HPI:   This is a 63year old female coming in for R knee pain an session: 1 or 2      Binge frequency: Never      Comment: occ    Drug use: No    Family History:  Family History   Problem Relation Age of Onset   • Heart Disorder Father    • Heart Disorder Mother    • Hypertension Mother    • Lipids Mother    • Breast Ca negative except for as mentioned in the HPI. Additionally she notes that usually her BP at home has been in the 130s/80s but recently has been elevated because of pain likely.      EXAM:   BP (!) 150/100   Pulse 90   Temp 97.8 °F (36.6 °C) (Temporal)   Resp Normal capillary refill: Yes      ASSESSMENT AND PLAN:   1. Contusion of right knee, initial encounter  - XR KNEE ROUTINE (3 VIEWS), RIGHT (CPT=73562); Future    2. Fall, initial encounter  - XR KNEE ROUTINE (3 VIEWS), RIGHT (CPT=73562);  Future          R breast     Exercise-induced asthma     Allergic rhinitis     GERD (gastroesophageal reflux disease)     Adenomatous polyp     Right bundle branch block     MONTANEZ (nonalcoholic steatohepatitis)     Closed nondisplaced fracture of base of fifth metacarpal bon

## 2020-12-15 RX ORDER — METOPROLOL SUCCINATE 100 MG/1
TABLET, EXTENDED RELEASE ORAL
Qty: 90 TABLET | Refills: 0 | Status: SHIPPED | OUTPATIENT
Start: 2020-12-15 | End: 2021-05-01

## 2020-12-15 NOTE — TELEPHONE ENCOUNTER
Hypertension Medications Protocol Hdycze66/15/2020 04:33 PM   CMP or BMP in past 12 months Protocol Details    Last serum creatinine< 2.0           Last OV on 12 4 2020 150/100- Per Dr. Renetta Heath- Blood pressure up likely due to discomfort.  Patient to monit

## 2020-12-16 ENCOUNTER — TELEPHONE (OUTPATIENT)
Dept: CASE MANAGEMENT | Age: 55
End: 2020-12-16

## 2020-12-16 RX ORDER — FLUTICASONE PROPIONATE AND SALMETEROL 250; 50 UG/1; UG/1
1 POWDER RESPIRATORY (INHALATION) EVERY 12 HOURS SCHEDULED
Qty: 1 EACH | Refills: 2 | Status: SHIPPED | OUTPATIENT
Start: 2020-12-16

## 2020-12-16 NOTE — TELEPHONE ENCOUNTER
Insurance prefers Advair Diskus inhaler over Symbicort. Spoke with patient stating she had been on the Advair in the past. She is willing to switch back to Advair.  Pended prescription for Advair diskus 250-50 mcg/dose to preferred pharmacy for 90 day s

## 2021-01-21 DIAGNOSIS — N39.3 STRESS INCONTINENCE, FEMALE: Primary | ICD-10-CM

## 2021-01-21 DIAGNOSIS — R35.0 URINARY FREQUENCY: ICD-10-CM

## 2021-02-09 ENCOUNTER — OFFICE VISIT (OUTPATIENT)
Dept: UROLOGY | Facility: HOSPITAL | Age: 56
End: 2021-02-09
Attending: OBSTETRICS & GYNECOLOGY
Payer: COMMERCIAL

## 2021-02-09 VITALS — BODY MASS INDEX: 39.44 KG/M2 | TEMPERATURE: 98 F | WEIGHT: 231 LBS | HEIGHT: 64 IN

## 2021-02-09 DIAGNOSIS — N81.84 PELVIC MUSCLE WASTING: Primary | ICD-10-CM

## 2021-02-09 DIAGNOSIS — Z98.890 POST-OPERATIVE STATE: ICD-10-CM

## 2021-02-09 DIAGNOSIS — N95.2 POSTMENOPAUSAL ATROPHIC VAGINITIS: ICD-10-CM

## 2021-02-09 PROCEDURE — 99212 OFFICE O/P EST SF 10 MIN: CPT

## 2021-02-09 RX ORDER — ESTRADIOL 0.1 MG/G
CREAM VAGINAL
Qty: 1 TUBE | Refills: 3 | Status: SHIPPED | OUTPATIENT
Start: 2021-02-09

## 2021-02-09 NOTE — PROGRESS NOTES
She is s/p Post-Op Summary  Procedure Date: 11/02/20  Procedure Name: Светлана Busing Sling;Cystoscopy  Post-Op Symptoms: Patient denies pain, LEONARD, UUI, prolapse symptoms, nausea/vomitting, fevers/chills, bleeding, voiding dysfunction, and defecatory dysfunct

## 2021-02-09 NOTE — PATIENT INSTRUCTIONS
[facility]  [facility]    Fingertip Application Method for Estrogen Vaginal Cream    1. Wash you hands with soap and water and dry thoroughly.     2.  Squeeze out enough cream from the tube to cover 1/2 of your index fin

## 2021-03-02 ENCOUNTER — MED REC SCAN ONLY (OUTPATIENT)
Dept: FAMILY MEDICINE CLINIC | Facility: CLINIC | Age: 56
End: 2021-03-02

## 2021-03-20 ENCOUNTER — TELEPHONE (OUTPATIENT)
Dept: FAMILY MEDICINE CLINIC | Facility: CLINIC | Age: 56
End: 2021-03-20

## 2021-03-20 RX ORDER — METOPROLOL SUCCINATE 100 MG/1
100 TABLET, EXTENDED RELEASE ORAL DAILY
Qty: 30 TABLET | Refills: 0 | Status: SHIPPED | OUTPATIENT
Start: 2021-03-20 | End: 2021-06-21 | Stop reason: DRUGHIGH

## 2021-03-20 RX ORDER — METOPROLOL SUCCINATE 100 MG/1
TABLET, EXTENDED RELEASE ORAL
Qty: 90 TABLET | Refills: 0 | OUTPATIENT
Start: 2021-03-20

## 2021-03-20 RX ORDER — HYDROCHLOROTHIAZIDE 25 MG/1
25 TABLET ORAL DAILY
Qty: 30 TABLET | Refills: 5 | Status: SHIPPED | OUTPATIENT
Start: 2021-03-20 | End: 2021-06-21

## 2021-03-20 NOTE — TELEPHONE ENCOUNTER
Kai Sullivan is in [facility] on vacation and  left her medications at home. She ask only for metoprolol  mg to be refilled at the [address] [place] [address]. Was told the pharmacy in [facility] can then fill it for her.  Gave her #30 and she is going to make a follow up wit

## 2021-03-31 ENCOUNTER — TELEPHONE (OUTPATIENT)
Dept: FAMILY MEDICINE CLINIC | Facility: CLINIC | Age: 56
End: 2021-03-31

## 2021-03-31 NOTE — TELEPHONE ENCOUNTER
Letter mailed to patient reminding her she is due for labs.     Lab Frequency Next Occurrence   COMP METABOLIC PANEL (14) Once 97/85/1558

## 2021-04-20 ENCOUNTER — IMMUNIZATION (OUTPATIENT)
Dept: LAB | Age: 56
End: 2021-04-20
Attending: HOSPITALIST
Payer: COMMERCIAL

## 2021-04-20 DIAGNOSIS — Z23 NEED FOR VACCINATION: Primary | ICD-10-CM

## 2021-04-20 PROCEDURE — 0001A SARSCOV2 VAC 30MCG/0.3ML IM: CPT

## 2021-05-03 RX ORDER — METOPROLOL SUCCINATE 100 MG/1
100 TABLET, EXTENDED RELEASE ORAL DAILY
Qty: 90 TABLET | Refills: 0 | Status: SHIPPED | OUTPATIENT
Start: 2021-05-03 | End: 2021-05-13

## 2021-05-10 ENCOUNTER — TELEPHONE (OUTPATIENT)
Dept: FAMILY MEDICINE CLINIC | Facility: CLINIC | Age: 56
End: 2021-05-10

## 2021-05-10 ENCOUNTER — HOSPITAL ENCOUNTER (EMERGENCY)
Facility: HOSPITAL | Age: 56
Discharge: HOME | End: 2021-05-10
Attending: EMERGENCY MEDICINE
Payer: COMMERCIAL

## 2021-05-10 VITALS
HEIGHT: 64 IN | HEART RATE: 84 BPM | WEIGHT: 224 LBS | OXYGEN SATURATION: 97 % | BODY MASS INDEX: 38.24 KG/M2 | RESPIRATION RATE: 16 BRPM | SYSTOLIC BLOOD PRESSURE: 163 MMHG | TEMPERATURE: 98 F | DIASTOLIC BLOOD PRESSURE: 82 MMHG

## 2021-05-10 DIAGNOSIS — R03.0 ELEVATED BLOOD PRESSURE READING: Primary | ICD-10-CM

## 2021-05-10 PROCEDURE — 99283 EMERGENCY DEPT VISIT LOW MDM: CPT

## 2021-05-10 RX ORDER — ACETAMINOPHEN 500 MG
1000 TABLET ORAL ONCE
Status: COMPLETED | OUTPATIENT
Start: 2021-05-10 | End: 2021-05-10

## 2021-05-10 RX ORDER — CLONIDINE HYDROCHLORIDE 0.1 MG/1
0.1 TABLET ORAL ONCE
Status: COMPLETED | OUTPATIENT
Start: 2021-05-10 | End: 2021-05-10

## 2021-05-10 RX ORDER — CLONIDINE HYDROCHLORIDE 0.1 MG/1
0.1 TABLET ORAL 2 TIMES DAILY PRN
Qty: 10 TABLET | Refills: 0 | Status: SHIPPED | OUTPATIENT
Start: 2021-05-10 | End: 2021-05-20 | Stop reason: ALTCHOICE

## 2021-05-10 NOTE — ED PROVIDER NOTES
Patient Seen in: [facility] Emergency Department      History   Patient presents with:  Hypertension  Headache    Stated Complaint: htn at home   occular headache  since sunday    HPI/Subjective:   HPI  24-year-old female past medical history of hyp sunday  Other systems are as noted in HPI. Constitutional and vital signs reviewed. All other systems reviewed and negative except as noted above.     Physical Exam     ED Triage Vitals [05/10/21 0155]   BP (!) 177/89   Pulse 91   Resp 17   Temp 97.5 and Tylenol with significant improvement of her symptoms. I did discuss supportive care with decreased salt intake clonidine as needed and monitor your blood pressures and having a blood pressure medication adjustment by her primary care physician.   Wil

## 2021-05-11 ENCOUNTER — IMMUNIZATION (OUTPATIENT)
Dept: LAB | Age: 56
End: 2021-05-11
Attending: HOSPITALIST
Payer: COMMERCIAL

## 2021-05-11 DIAGNOSIS — Z23 NEED FOR VACCINATION: Primary | ICD-10-CM

## 2021-05-11 PROCEDURE — 0002A SARSCOV2 VAC 30MCG/0.3ML IM: CPT

## 2021-05-13 ENCOUNTER — OFFICE VISIT (OUTPATIENT)
Dept: FAMILY MEDICINE CLINIC | Facility: CLINIC | Age: 56
End: 2021-05-13

## 2021-05-13 VITALS
HEART RATE: 80 BPM | WEIGHT: 230.81 LBS | BODY MASS INDEX: 40 KG/M2 | RESPIRATION RATE: 16 BRPM | SYSTOLIC BLOOD PRESSURE: 144 MMHG | DIASTOLIC BLOOD PRESSURE: 86 MMHG | TEMPERATURE: 98 F

## 2021-05-13 DIAGNOSIS — M25.562 BILATERAL CHRONIC KNEE PAIN: ICD-10-CM

## 2021-05-13 DIAGNOSIS — M25.561 ACUTE PAIN OF BOTH KNEES: ICD-10-CM

## 2021-05-13 DIAGNOSIS — M25.562 ACUTE PAIN OF BOTH KNEES: ICD-10-CM

## 2021-05-13 DIAGNOSIS — E66.9 OBESITY (BMI 30-39.9): ICD-10-CM

## 2021-05-13 DIAGNOSIS — G89.29 BILATERAL CHRONIC KNEE PAIN: ICD-10-CM

## 2021-05-13 DIAGNOSIS — I45.10 RIGHT BUNDLE BRANCH BLOCK: ICD-10-CM

## 2021-05-13 DIAGNOSIS — M25.561 BILATERAL CHRONIC KNEE PAIN: ICD-10-CM

## 2021-05-13 DIAGNOSIS — I10 ESSENTIAL HYPERTENSION: Primary | ICD-10-CM

## 2021-05-13 PROCEDURE — 99214 OFFICE O/P EST MOD 30 MIN: CPT | Performed by: FAMILY MEDICINE

## 2021-05-13 PROCEDURE — 3079F DIAST BP 80-89 MM HG: CPT | Performed by: FAMILY MEDICINE

## 2021-05-13 PROCEDURE — 3077F SYST BP >= 140 MM HG: CPT | Performed by: FAMILY MEDICINE

## 2021-05-13 RX ORDER — METOPROLOL SUCCINATE 100 MG/1
TABLET, EXTENDED RELEASE ORAL
Qty: 90 TABLET | Refills: 0 | COMMUNITY
Start: 2021-05-13 | End: 2021-06-21

## 2021-05-13 NOTE — PROGRESS NOTES
Mars Augustina is a 61year old female.   HPI:   Chely Gonzalez is here for ER follow up after she was in the ER for elevated BP, she woke up a headache and could feel her heart pounding in her neck, and notes prior to that, she noted she could see her eyes pulsatin as needed. • Barberry-Oreg Grape-Goldenseal (BERBERINE COMPLEX) 200-200-50 MG Oral Cap Take 500 mg by mouth as needed.      • Pantoprazole Sodium 40 MG Oral Tab EC Take 1 tablet (40 mg total) by mouth every morning before breakfast. 90 tablet 2   • Albu atraumatic, normocephalic,ears and throat are clear  NECK: supple,no adenopathy,no bruits  LUNGS: clear to auscultation  CARDIO: RRR without murmur, elevated BP  GI: good BS's,no masses, HSM or tenderness  EXTREMITIES: no cyanosis, clubbing or edema, has c

## 2021-05-20 ENCOUNTER — TELEPHONE (OUTPATIENT)
Dept: FAMILY MEDICINE CLINIC | Facility: CLINIC | Age: 56
End: 2021-05-20

## 2021-05-20 ENCOUNTER — OFFICE VISIT (OUTPATIENT)
Dept: FAMILY MEDICINE CLINIC | Facility: CLINIC | Age: 56
End: 2021-05-20

## 2021-05-20 VITALS
WEIGHT: 228.38 LBS | DIASTOLIC BLOOD PRESSURE: 80 MMHG | BODY MASS INDEX: 39 KG/M2 | TEMPERATURE: 99 F | SYSTOLIC BLOOD PRESSURE: 124 MMHG | HEART RATE: 84 BPM | RESPIRATION RATE: 18 BRPM

## 2021-05-20 DIAGNOSIS — E04.2 MULTIPLE THYROID NODULES: ICD-10-CM

## 2021-05-20 DIAGNOSIS — G89.29 CHRONIC PAIN OF BOTH KNEES: ICD-10-CM

## 2021-05-20 DIAGNOSIS — M25.561 CHRONIC PAIN OF BOTH KNEES: ICD-10-CM

## 2021-05-20 DIAGNOSIS — I10 ESSENTIAL HYPERTENSION: Primary | ICD-10-CM

## 2021-05-20 DIAGNOSIS — Z12.31 ENCOUNTER FOR SCREENING MAMMOGRAM FOR BREAST CANCER: Primary | ICD-10-CM

## 2021-05-20 DIAGNOSIS — M25.562 CHRONIC PAIN OF BOTH KNEES: ICD-10-CM

## 2021-05-20 DIAGNOSIS — E66.9 OBESITY (BMI 30-39.9): ICD-10-CM

## 2021-05-20 PROCEDURE — 3079F DIAST BP 80-89 MM HG: CPT | Performed by: FAMILY MEDICINE

## 2021-05-20 PROCEDURE — 99214 OFFICE O/P EST MOD 30 MIN: CPT | Performed by: FAMILY MEDICINE

## 2021-05-20 PROCEDURE — 3074F SYST BP LT 130 MM HG: CPT | Performed by: FAMILY MEDICINE

## 2021-05-20 NOTE — PROGRESS NOTES
Alissa Bunch is a 61year old female.   HPI:   Madhavi Valerio is here for ER follow up after she was in the ER for elevated BP, she woke up a headache and could feel her heart pounding in her neck, and notes prior to that, she noted she could see her eyes pulsatin hours. (Patient not taking: Reported on 5/13/2021 ) 1 each 2   • ibuprofen 600 MG Oral Tab Take 1 tablet (600 mg total) by mouth every 6 (six) hours as needed. 30 tablet 3   • Melatonin 10 MG Oral Cap Take by mouth as needed.      • Zinc Sulfate (ZINC 15 OR 39.20 kg/m²   GENERAL: well developed, well nourished,in no apparent distress  SKIN: no rashes,no suspicious lesions  HEENT: atraumatic, normocephalic,ears and throat are clear  NECK: supple,no adenopathy,no bruits  LUNGS: clear to auscultation  CARDIO: RR Xolair Counseling:  Patient informed of potential adverse effects including but not limited to fever, muscle aches, rash and allergic reactions.  The patient verbalized understanding of the proper use and possible adverse effects of Xolair.  All of the patient's questions and concerns were addressed.

## 2021-05-21 NOTE — TELEPHONE ENCOUNTER
Left message on patients voice mail that the mammogram order has been placed. Phone number for central scheduling provided to patient.

## 2021-05-24 ENCOUNTER — LAB ENCOUNTER (OUTPATIENT)
Dept: LAB | Age: 56
End: 2021-05-24
Attending: FAMILY MEDICINE
Payer: COMMERCIAL

## 2021-05-24 DIAGNOSIS — M25.561 CHRONIC PAIN OF BOTH KNEES: ICD-10-CM

## 2021-05-24 DIAGNOSIS — M25.562 CHRONIC PAIN OF BOTH KNEES: ICD-10-CM

## 2021-05-24 DIAGNOSIS — I10 ESSENTIAL HYPERTENSION: ICD-10-CM

## 2021-05-24 DIAGNOSIS — G89.29 CHRONIC PAIN OF BOTH KNEES: ICD-10-CM

## 2021-05-24 DIAGNOSIS — E04.2 MULTIPLE THYROID NODULES: ICD-10-CM

## 2021-05-24 DIAGNOSIS — E66.9 OBESITY (BMI 30-39.9): ICD-10-CM

## 2021-05-24 PROCEDURE — 80053 COMPREHEN METABOLIC PANEL: CPT

## 2021-05-24 PROCEDURE — 84481 FREE ASSAY (FT-3): CPT

## 2021-05-24 PROCEDURE — 80061 LIPID PANEL: CPT

## 2021-05-24 PROCEDURE — 36415 COLL VENOUS BLD VENIPUNCTURE: CPT

## 2021-05-24 PROCEDURE — 86800 THYROGLOBULIN ANTIBODY: CPT

## 2021-05-24 PROCEDURE — 84443 ASSAY THYROID STIM HORMONE: CPT

## 2021-05-24 PROCEDURE — 86376 MICROSOMAL ANTIBODY EACH: CPT

## 2021-05-25 ENCOUNTER — TELEPHONE (OUTPATIENT)
Dept: FAMILY MEDICINE CLINIC | Facility: CLINIC | Age: 56
End: 2021-05-25

## 2021-05-25 DIAGNOSIS — R74.8 ELEVATED LIVER ENZYMES: ICD-10-CM

## 2021-05-25 DIAGNOSIS — R73.9 HYPERGLYCEMIA: Primary | ICD-10-CM

## 2021-05-25 NOTE — TELEPHONE ENCOUNTER
----- Message from Venessa Campoverde DO sent at 5/25/2021  8:20 AM CDT -----  Can notify [facility] her labs actually looked very good, her liver function tests are coming down and almost normal, her BS was slightly elevated, her thyorid function looks stable, wait

## 2021-06-08 ENCOUNTER — HOSPITAL ENCOUNTER (OUTPATIENT)
Dept: MAMMOGRAPHY | Age: 56
Discharge: HOME | End: 2021-06-08
Attending: FAMILY MEDICINE
Payer: COMMERCIAL

## 2021-06-08 ENCOUNTER — HOSPITAL ENCOUNTER (OUTPATIENT)
Dept: ULTRASOUND IMAGING | Age: 56
Discharge: HOME | End: 2021-06-08
Attending: FAMILY MEDICINE
Payer: COMMERCIAL

## 2021-06-08 DIAGNOSIS — E04.2 MULTIPLE THYROID NODULES: ICD-10-CM

## 2021-06-08 DIAGNOSIS — Z12.31 ENCOUNTER FOR SCREENING MAMMOGRAM FOR BREAST CANCER: ICD-10-CM

## 2021-06-08 PROCEDURE — 76536 US EXAM OF HEAD AND NECK: CPT | Performed by: FAMILY MEDICINE

## 2021-06-08 PROCEDURE — 77067 SCR MAMMO BI INCL CAD: CPT | Performed by: FAMILY MEDICINE

## 2021-06-08 PROCEDURE — 77063 BREAST TOMOSYNTHESIS BI: CPT | Performed by: FAMILY MEDICINE

## 2021-06-10 ENCOUNTER — TELEPHONE (OUTPATIENT)
Dept: FAMILY MEDICINE CLINIC | Facility: CLINIC | Age: 56
End: 2021-06-10

## 2021-06-10 DIAGNOSIS — E04.2 MULTIPLE THYROID NODULES: Primary | ICD-10-CM

## 2021-06-10 NOTE — TELEPHONE ENCOUNTER
Pt was advised- verbalized understanding    Pt saw Dr. Alfie Bolaños in the past and would like to see someone different    Pt is IHP needs to see EDW location    Referral pended- needs DX    Route back to RN

## 2021-06-10 NOTE — TELEPHONE ENCOUNTER
----- Message from Sherryle Curb, DO sent at 6/10/2021  7:57 AM CDT -----  Can notifisreal Stauffer Do would like her to see an endocrinologist, to help us figure out what to do with these cysts.  Some of them are smaller, some have slightly changed, and they would be

## 2021-06-17 ENCOUNTER — TELEPHONE (OUTPATIENT)
Dept: PHYSICAL THERAPY | Facility: HOSPITAL | Age: 56
End: 2021-06-17

## 2021-06-21 ENCOUNTER — OFFICE VISIT (OUTPATIENT)
Dept: PHYSICAL THERAPY | Age: 56
End: 2021-06-21
Attending: FAMILY MEDICINE
Payer: COMMERCIAL

## 2021-06-21 ENCOUNTER — OFFICE VISIT (OUTPATIENT)
Dept: FAMILY MEDICINE CLINIC | Facility: CLINIC | Age: 56
End: 2021-06-21

## 2021-06-21 VITALS
WEIGHT: 229 LBS | BODY MASS INDEX: 39 KG/M2 | TEMPERATURE: 98 F | RESPIRATION RATE: 16 BRPM | DIASTOLIC BLOOD PRESSURE: 82 MMHG | HEART RATE: 92 BPM | SYSTOLIC BLOOD PRESSURE: 138 MMHG

## 2021-06-21 DIAGNOSIS — I10 ESSENTIAL HYPERTENSION: ICD-10-CM

## 2021-06-21 DIAGNOSIS — K21.00 GASTROESOPHAGEAL REFLUX DISEASE WITH ESOPHAGITIS WITHOUT HEMORRHAGE: ICD-10-CM

## 2021-06-21 DIAGNOSIS — J02.9 PHARYNGITIS, UNSPECIFIED ETIOLOGY: ICD-10-CM

## 2021-06-21 DIAGNOSIS — G89.29 BILATERAL CHRONIC KNEE PAIN: ICD-10-CM

## 2021-06-21 DIAGNOSIS — E66.9 OBESITY (BMI 30-39.9): ICD-10-CM

## 2021-06-21 DIAGNOSIS — M25.561 BILATERAL CHRONIC KNEE PAIN: ICD-10-CM

## 2021-06-21 DIAGNOSIS — R06.83 SNORING: Primary | ICD-10-CM

## 2021-06-21 DIAGNOSIS — E04.2 MULTIPLE THYROID NODULES: ICD-10-CM

## 2021-06-21 DIAGNOSIS — M25.562 BILATERAL CHRONIC KNEE PAIN: ICD-10-CM

## 2021-06-21 DIAGNOSIS — K31.7 MULTIPLE GASTRIC POLYPS: ICD-10-CM

## 2021-06-21 PROCEDURE — 99214 OFFICE O/P EST MOD 30 MIN: CPT | Performed by: FAMILY MEDICINE

## 2021-06-21 PROCEDURE — 3079F DIAST BP 80-89 MM HG: CPT | Performed by: FAMILY MEDICINE

## 2021-06-21 PROCEDURE — 3075F SYST BP GE 130 - 139MM HG: CPT | Performed by: FAMILY MEDICINE

## 2021-06-21 PROCEDURE — 97110 THERAPEUTIC EXERCISES: CPT

## 2021-06-21 PROCEDURE — 97161 PT EVAL LOW COMPLEX 20 MIN: CPT

## 2021-06-21 RX ORDER — METOPROLOL SUCCINATE 100 MG/1
TABLET, EXTENDED RELEASE ORAL
Qty: 180 TABLET | Refills: 2 | Status: SHIPPED | OUTPATIENT
Start: 2021-06-21

## 2021-06-21 RX ORDER — PANTOPRAZOLE SODIUM 40 MG/1
40 TABLET, DELAYED RELEASE ORAL
Qty: 90 TABLET | Refills: 2 | Status: SHIPPED | OUTPATIENT
Start: 2021-06-21

## 2021-06-21 RX ORDER — HYDROCHLOROTHIAZIDE 25 MG/1
25 TABLET ORAL DAILY
Qty: 90 TABLET | Refills: 2 | Status: SHIPPED | OUTPATIENT
Start: 2021-06-21 | End: 2021-09-19

## 2021-06-21 NOTE — PROGRESS NOTES
Zeus Parks is a 61year old female. HPI:   Gerry Ware, is here for follow up her BP and weight , her BP is better, is still having issues with her right knee, first session tonight. Her BP at home has been good. 128-138/70-84.   She is fatigued all the time glasses      Social History:  Social History    Tobacco Use      Smoking status: Never Smoker      Smokeless tobacco: Never Used    Vaping Use      Vaping Use: Never used    Alcohol use:  Yes      Alcohol/week: 2.0 standard drinks      Types: 2 Standard Tab EC 90 tablet 2     Sig: Take 1 tablet (40 mg total) by mouth every morning before breakfast.       Imaging & Consults:  ENT - INTERNAL     The patient indicates understanding of these issues and agrees to the plan.   The patient is asked to return in 6

## 2021-06-23 ENCOUNTER — OFFICE VISIT (OUTPATIENT)
Dept: PHYSICAL THERAPY | Age: 56
End: 2021-06-23
Attending: FAMILY MEDICINE
Payer: COMMERCIAL

## 2021-06-23 DIAGNOSIS — M25.562 BILATERAL CHRONIC KNEE PAIN: ICD-10-CM

## 2021-06-23 DIAGNOSIS — G89.29 BILATERAL CHRONIC KNEE PAIN: ICD-10-CM

## 2021-06-23 DIAGNOSIS — M25.561 BILATERAL CHRONIC KNEE PAIN: ICD-10-CM

## 2021-06-23 PROCEDURE — 97110 THERAPEUTIC EXERCISES: CPT

## 2021-06-23 PROCEDURE — 97140 MANUAL THERAPY 1/> REGIONS: CPT

## 2021-06-23 NOTE — PROGRESS NOTES
Dx: Right Knee OA         Authorized # of Visits:  8 (HMO)         Next MD visit: none scheduled  Fall Risk: standard         Precautions: n/a      Last MD Note: 06/21/21     Goal Number: 8    Subjective: States that the knee has been moderately painful, m

## 2021-06-28 ENCOUNTER — OFFICE VISIT (OUTPATIENT)
Dept: PHYSICAL THERAPY | Age: 56
End: 2021-06-28
Attending: FAMILY MEDICINE
Payer: COMMERCIAL

## 2021-06-28 ENCOUNTER — HOSPITAL ENCOUNTER (EMERGENCY)
Facility: HOSPITAL | Age: 56
Discharge: HOME | End: 2021-06-28
Attending: EMERGENCY MEDICINE
Payer: COMMERCIAL

## 2021-06-28 ENCOUNTER — APPOINTMENT (OUTPATIENT)
Dept: GENERAL RADIOLOGY | Facility: HOSPITAL | Age: 56
End: 2021-06-28
Attending: EMERGENCY MEDICINE
Payer: COMMERCIAL

## 2021-06-28 VITALS
SYSTOLIC BLOOD PRESSURE: 168 MMHG | TEMPERATURE: 98 F | RESPIRATION RATE: 18 BRPM | OXYGEN SATURATION: 99 % | WEIGHT: 220 LBS | BODY MASS INDEX: 37.56 KG/M2 | HEART RATE: 93 BPM | HEIGHT: 64 IN | DIASTOLIC BLOOD PRESSURE: 86 MMHG

## 2021-06-28 DIAGNOSIS — M25.562 BILATERAL CHRONIC KNEE PAIN: ICD-10-CM

## 2021-06-28 DIAGNOSIS — M54.6 ACUTE RIGHT-SIDED THORACIC BACK PAIN: Primary | ICD-10-CM

## 2021-06-28 DIAGNOSIS — G89.29 BILATERAL CHRONIC KNEE PAIN: ICD-10-CM

## 2021-06-28 DIAGNOSIS — M25.561 BILATERAL CHRONIC KNEE PAIN: ICD-10-CM

## 2021-06-28 PROCEDURE — 99284 EMERGENCY DEPT VISIT MOD MDM: CPT

## 2021-06-28 PROCEDURE — 84484 ASSAY OF TROPONIN QUANT: CPT | Performed by: EMERGENCY MEDICINE

## 2021-06-28 PROCEDURE — 96374 THER/PROPH/DIAG INJ IV PUSH: CPT

## 2021-06-28 PROCEDURE — 97110 THERAPEUTIC EXERCISES: CPT

## 2021-06-28 PROCEDURE — 97140 MANUAL THERAPY 1/> REGIONS: CPT

## 2021-06-28 PROCEDURE — 93005 ELECTROCARDIOGRAM TRACING: CPT

## 2021-06-28 PROCEDURE — 85025 COMPLETE CBC W/AUTO DIFF WBC: CPT | Performed by: EMERGENCY MEDICINE

## 2021-06-28 PROCEDURE — 93010 ELECTROCARDIOGRAM REPORT: CPT

## 2021-06-28 PROCEDURE — 71046 X-RAY EXAM CHEST 2 VIEWS: CPT | Performed by: EMERGENCY MEDICINE

## 2021-06-28 PROCEDURE — 80053 COMPREHEN METABOLIC PANEL: CPT | Performed by: EMERGENCY MEDICINE

## 2021-06-28 PROCEDURE — 85379 FIBRIN DEGRADATION QUANT: CPT | Performed by: EMERGENCY MEDICINE

## 2021-06-28 RX ORDER — KETOROLAC TROMETHAMINE 30 MG/ML
30 INJECTION, SOLUTION INTRAMUSCULAR; INTRAVENOUS ONCE
Status: COMPLETED | OUTPATIENT
Start: 2021-06-28 | End: 2021-06-28

## 2021-06-28 NOTE — ED PROVIDER NOTES
Patient Seen in: [facility] Emergency Department      History   Patient presents with:  Pain    Stated Complaint: states her right lung hurts when she breathes     HPI/Subjective:   HPI    23-year-old female presents reporting pain to the right post per week      Comment: rare    Drug use: No             Review of Systems    Positive for stated complaint: states her right lung hurts when she breathes   Other systems are as noted in HPI. Constitutional and vital signs reviewed.       All other systems -----------         ------                     CBC W/ DIFFERENTIAL[[id number]]                              Final result                 Please view results for these tests on the individual orders.    CBC W/ DIFFERENTIAL     EKG    Rate, inte but she would prefer to continue with OTC medication and will contact her PCP if she needs something stronger.                              Disposition and Plan     Clinical Impression:  Acute right-sided thoracic back pain  (primary encounter diagnosis)

## 2021-06-28 NOTE — PROGRESS NOTES
Dx: Right Knee OA         Authorized # of Visits:  8 (HMO)         Next MD visit: none scheduled  Fall Risk: standard         Precautions: n/a      Last MD Note: 06/21/21     Goal Number: 8    Subjective: States that she went to the ED this morning for tre ambulation and stair negotiation  5. Patient will demonstrate an increase in MLT of the anterior hip musculature in order to return to sustained ambulation and stair negotiation. Plan: Assess response and progress as tolerated. Charges:  There Ex

## 2021-06-30 ENCOUNTER — OFFICE VISIT (OUTPATIENT)
Dept: PHYSICAL THERAPY | Age: 56
End: 2021-06-30
Attending: FAMILY MEDICINE
Payer: COMMERCIAL

## 2021-06-30 DIAGNOSIS — M25.561 BILATERAL CHRONIC KNEE PAIN: ICD-10-CM

## 2021-06-30 DIAGNOSIS — G89.29 BILATERAL CHRONIC KNEE PAIN: ICD-10-CM

## 2021-06-30 DIAGNOSIS — M25.562 BILATERAL CHRONIC KNEE PAIN: ICD-10-CM

## 2021-06-30 PROCEDURE — 97140 MANUAL THERAPY 1/> REGIONS: CPT

## 2021-06-30 PROCEDURE — 97110 THERAPEUTIC EXERCISES: CPT

## 2021-06-30 NOTE — PROGRESS NOTES
Dx: Right Knee OA         Authorized # of Visits:  8 (HMO)         Next MD visit: none scheduled  Fall Risk: standard         Precautions: n/a      Last MD Note: 06/21/21     Goal Number: 8    Subjective: Feeling better today in the knee.   Notes some anter will have an increase in hip mobility to 75% normal in order to return to ambulation and stair negotiation with less pain. 4. Patient will have an increase in hip strength to assist with returning to sustained ambulation and stair negotiation  5.  Jose Raul

## 2021-07-07 ENCOUNTER — OFFICE VISIT (OUTPATIENT)
Dept: PHYSICAL THERAPY | Age: 56
End: 2021-07-07
Attending: FAMILY MEDICINE
Payer: COMMERCIAL

## 2021-07-07 DIAGNOSIS — M25.562 BILATERAL CHRONIC KNEE PAIN: ICD-10-CM

## 2021-07-07 DIAGNOSIS — G89.29 BILATERAL CHRONIC KNEE PAIN: ICD-10-CM

## 2021-07-07 DIAGNOSIS — M25.561 BILATERAL CHRONIC KNEE PAIN: ICD-10-CM

## 2021-07-07 PROCEDURE — 97140 MANUAL THERAPY 1/> REGIONS: CPT

## 2021-07-07 PROCEDURE — 97110 THERAPEUTIC EXERCISES: CPT

## 2021-07-07 NOTE — PROGRESS NOTES
Dx: Right Knee OA         Authorized # of Visits:  8 (HMO)         Next MD visit: none scheduled  Fall Risk: standard         Precautions: n/a      Last MD Note: 06/21/21     Goal Number: 8    Subjective: States that the knee is doing well.   States that sh and patellar glide. Assessment: Responded well with an increase in mobility at the hips. Stair gait has improved to no longer load the anterior patella with stepping up. Goals (to be met in 8 visits):    1.  Increase FOTO assessment > 11% f

## 2021-07-12 ENCOUNTER — OFFICE VISIT (OUTPATIENT)
Dept: PHYSICAL THERAPY | Age: 56
End: 2021-07-12
Attending: FAMILY MEDICINE
Payer: COMMERCIAL

## 2021-07-12 DIAGNOSIS — G89.29 BILATERAL CHRONIC KNEE PAIN: ICD-10-CM

## 2021-07-12 DIAGNOSIS — M25.562 BILATERAL CHRONIC KNEE PAIN: ICD-10-CM

## 2021-07-12 DIAGNOSIS — M25.561 BILATERAL CHRONIC KNEE PAIN: ICD-10-CM

## 2021-07-12 PROCEDURE — 97140 MANUAL THERAPY 1/> REGIONS: CPT

## 2021-07-12 PROCEDURE — 97110 THERAPEUTIC EXERCISES: CPT

## 2021-07-12 NOTE — PROGRESS NOTES
Dx: Right Knee OA         Authorized # of Visits:  8 (HMO)         Next MD visit: none scheduled  Fall Risk: standard         Precautions: n/a      Last MD Note: 06/21/21     Goal Number: 8    Subjective: States that the knee is mildly aggravated.   Feels t Manual PT (25 min)     Hip IR and FLXN manipulation; Medial quadriceps and patellar glide. Hip IR and FLXN manipulation; Medial quadriceps and patellar glide. Hip IR and FLXN manipulation; Medial quadriceps and patellar glide.   Hip IR and FLXN manipula

## 2021-07-14 ENCOUNTER — OFFICE VISIT (OUTPATIENT)
Dept: PHYSICAL THERAPY | Age: 56
End: 2021-07-14
Attending: FAMILY MEDICINE
Payer: COMMERCIAL

## 2021-07-14 DIAGNOSIS — G89.29 BILATERAL CHRONIC KNEE PAIN: ICD-10-CM

## 2021-07-14 DIAGNOSIS — M25.562 BILATERAL CHRONIC KNEE PAIN: ICD-10-CM

## 2021-07-14 DIAGNOSIS — M25.561 BILATERAL CHRONIC KNEE PAIN: ICD-10-CM

## 2021-07-14 PROCEDURE — 97140 MANUAL THERAPY 1/> REGIONS: CPT

## 2021-07-14 PROCEDURE — 97110 THERAPEUTIC EXERCISES: CPT

## 2021-07-14 PROCEDURE — 97014 ELECTRIC STIMULATION THERAPY: CPT

## 2021-07-14 NOTE — PROGRESS NOTES
Dx: Right Knee OA         Authorized # of Visits:  8 (HMO)         Next MD visit: none scheduled  Fall Risk: standard         Precautions: n/a      Last MD Note: 06/21/21     Goal Number: 8    Subjective: Suprapatellar pain is elevated. Objective:  Deloris Trejo Manual PT (25 min) Manual PT (25 min) Manual PT (25 min) Manual PT (25 min) Manual PT (25 min) Manual PT (10 min)    Hip IR and FLXN manipulation; Medial quadriceps and patellar glide. Hip IR and FLXN manipulation; Medial quadriceps and patellar glide.

## 2021-07-19 ENCOUNTER — OFFICE VISIT (OUTPATIENT)
Dept: PHYSICAL THERAPY | Age: 56
End: 2021-07-19
Attending: FAMILY MEDICINE
Payer: COMMERCIAL

## 2021-07-19 DIAGNOSIS — M25.561 BILATERAL CHRONIC KNEE PAIN: ICD-10-CM

## 2021-07-19 DIAGNOSIS — G89.29 BILATERAL CHRONIC KNEE PAIN: ICD-10-CM

## 2021-07-19 DIAGNOSIS — M25.562 BILATERAL CHRONIC KNEE PAIN: ICD-10-CM

## 2021-07-19 PROCEDURE — 97110 THERAPEUTIC EXERCISES: CPT

## 2021-07-19 PROCEDURE — 97140 MANUAL THERAPY 1/> REGIONS: CPT

## 2021-07-19 NOTE — PROGRESS NOTES
Discharge Summary  Pt has attended 8 visits in Physical Therapy.     Right Knee OA         Authorized # of Visits:  8 (HMO)         Next MD visit: none scheduled  Fall Risk: standard         Precautions: n/a      Last MD Note: 06/21/21     Goal Number: 8 [phone]. Sincerely,  Electronically signed by therapist: Nona Gonzalez, PT        Charges: There Ex 2 (25 min);  Manual PT 2 (25 min)       Total Timed Treatment: 50 min  Total Treatment Time: 50 min        Date:6/23/2021 TX#: 2/8  Date:6/28/2021  TX#: PT (10 min) Manual PT (10 min)   Hip IR and FLXN manipulation; Medial quadriceps and patellar glide. Hip IR and FLXN manipulation; Medial quadriceps and patellar glide. Hip IR and FLXN manipulation; Medial quadriceps and patellar glide.   Hip IR and FLX

## 2021-07-23 ENCOUNTER — PATIENT MESSAGE (OUTPATIENT)
Dept: FAMILY MEDICINE CLINIC | Facility: CLINIC | Age: 56
End: 2021-07-23

## 2021-07-23 NOTE — TELEPHONE ENCOUNTER
From: Husam Crowder  To: Topher Hyde DO  Sent: 7/23/2021 1:18 PM CDT  Subject: Visit Follow-up Question    I've finished the P/T on 7/19 and he was going to send you the report and may suggest following up with Ortho.  I wanted to know what the next step

## 2021-07-27 NOTE — PROGRESS NOTES
The patient would benefit from orhtopedic examination for possible cortisone injection to address the effusion at the joint level.

## 2021-08-09 ENCOUNTER — ORDER TRANSCRIPTION (OUTPATIENT)
Dept: SLEEP CENTER | Age: 56
End: 2021-08-09

## 2021-08-09 DIAGNOSIS — R06.83 SNORING: Primary | ICD-10-CM

## 2021-08-09 DIAGNOSIS — R06.81 APNEA: ICD-10-CM

## 2021-08-16 ENCOUNTER — MED REC SCAN ONLY (OUTPATIENT)
Dept: FAMILY MEDICINE CLINIC | Facility: CLINIC | Age: 56
End: 2021-08-16

## 2021-08-18 ENCOUNTER — OFFICE VISIT (OUTPATIENT)
Dept: SLEEP CENTER | Age: 56
End: 2021-08-18
Attending: Other
Payer: COMMERCIAL

## 2021-08-18 DIAGNOSIS — R06.83 SNORING: ICD-10-CM

## 2021-08-18 DIAGNOSIS — R06.81 APNEA: ICD-10-CM

## 2021-08-18 PROCEDURE — 95810 POLYSOM 6/> YRS 4/> PARAM: CPT

## 2021-08-23 ENCOUNTER — PATIENT MESSAGE (OUTPATIENT)
Dept: FAMILY MEDICINE CLINIC | Facility: CLINIC | Age: 56
End: 2021-08-23

## 2021-08-23 DIAGNOSIS — M25.562 BILATERAL CHRONIC KNEE PAIN: Primary | ICD-10-CM

## 2021-08-23 DIAGNOSIS — M25.561 BILATERAL CHRONIC KNEE PAIN: Primary | ICD-10-CM

## 2021-08-23 DIAGNOSIS — M22.41 CHONDROMALACIA, PATELLA, RIGHT: ICD-10-CM

## 2021-08-23 DIAGNOSIS — S80.01XA CONTUSION OF RIGHT KNEE, INITIAL ENCOUNTER: ICD-10-CM

## 2021-08-23 DIAGNOSIS — G89.29 BILATERAL CHRONIC KNEE PAIN: Primary | ICD-10-CM

## 2021-08-23 NOTE — TELEPHONE ENCOUNTER
From: Michi Carranza  To: Crow Reyna DO  Sent: 8/23/2021 2:41 PM CDT  Subject: Referral Request    I saw Dr. Malgorzata Wright last week and he wants me to try more physical therapy.  I tried to schedule PT thru [facility], but was told that since I'm thru [facility]

## 2021-09-13 ENCOUNTER — TELEPHONE (OUTPATIENT)
Dept: PHYSICAL THERAPY | Facility: HOSPITAL | Age: 56
End: 2021-09-13

## 2021-09-15 ENCOUNTER — OFFICE VISIT (OUTPATIENT)
Dept: PHYSICAL THERAPY | Age: 56
End: 2021-09-15
Attending: FAMILY MEDICINE
Payer: COMMERCIAL

## 2021-09-15 DIAGNOSIS — M25.561 BILATERAL CHRONIC KNEE PAIN: ICD-10-CM

## 2021-09-15 DIAGNOSIS — G89.29 BILATERAL CHRONIC KNEE PAIN: ICD-10-CM

## 2021-09-15 DIAGNOSIS — M22.41 CHONDROMALACIA, PATELLA, RIGHT: ICD-10-CM

## 2021-09-15 DIAGNOSIS — S80.01XA CONTUSION OF RIGHT KNEE, INITIAL ENCOUNTER: ICD-10-CM

## 2021-09-15 DIAGNOSIS — M25.562 BILATERAL CHRONIC KNEE PAIN: ICD-10-CM

## 2021-09-15 PROCEDURE — 97530 THERAPEUTIC ACTIVITIES: CPT

## 2021-09-15 PROCEDURE — 97161 PT EVAL LOW COMPLEX 20 MIN: CPT

## 2021-09-15 NOTE — PROGRESS NOTES
LOWER EXTREMITY EVALUATION:   Referring Physician: Dr. Nova Leyden  Diagnosis: R lateral facet patellofemoral irritation; R gastroc strain, R hip flexor strain, R knee OA     Date of Service: 9/15/2021     PATIENT SUMMARY   Brittney Alvarez is a 61year old fem on the sides with the knee bent- burning both knees. Denies N/T. She states she can move it around and it eases. 24 hour pattern: standing getting ready in mirror in the AM. She states t/o the day depends on how much standing/sitting she does.  She tries gastroc with fully body weight resistance producing R calf pain. Functional deficits include but are not limited to ascending/descending stairs, squatting, standing from chair, raising up on toes.   Signs and symptoms are consistent with diagnosis of R lat impaired    Strength/MMT: (* denotes performed with pain)  Hip Knee Foot/Ankle   Flexion: R 3+*hip/5; L 4/5  Extension: R 4/5; L 4/5  ER: R 3+*hip/5; L 4/5  IR: R 4/5; L 4/5 Flexion: R 3+*knee and hip/5; L 4/5  Extension: R 4/5; L 4/5    DF: R 5/5; L 5/5 and down from the floor safely   · Pt will demonstrate increased hip ER/ABD strength to 4/5 to perform stepping and squatting activities without excessive femoral IR/ADD   · Pt will improve SLS to 10s to improve safety with gait on uneven surfaces such as

## 2021-09-20 ENCOUNTER — OFFICE VISIT (OUTPATIENT)
Dept: PHYSICAL THERAPY | Age: 56
End: 2021-09-20
Attending: FAMILY MEDICINE
Payer: COMMERCIAL

## 2021-09-20 PROCEDURE — 97140 MANUAL THERAPY 1/> REGIONS: CPT

## 2021-09-20 PROCEDURE — 97110 THERAPEUTIC EXERCISES: CPT

## 2021-09-20 NOTE — PROGRESS NOTES
Dx: R lateral facet patellofemoral irritation; R gastroc strain, R hip flexor strain, R knee [employer] (Authorized # of Visits): HMO- 60 visit limit.             Authorizing Physician: Dr. Josefina Falk MD visit: none scheduled  Fall Risk: standa floor safely   · Pt will demonstrate increased hip ER/ABD strength to 4/5 to perform stepping and squatting activities without excessive femoral IR/ADD   · Pt will improve SLS to 10s to improve safety with gait on uneven surfaces such as grass and gravel

## 2021-09-22 ENCOUNTER — MED REC SCAN ONLY (OUTPATIENT)
Dept: FAMILY MEDICINE CLINIC | Facility: CLINIC | Age: 56
End: 2021-09-22

## 2021-09-22 ENCOUNTER — OFFICE VISIT (OUTPATIENT)
Dept: PHYSICAL THERAPY | Age: 56
End: 2021-09-22
Attending: FAMILY MEDICINE
Payer: COMMERCIAL

## 2021-09-22 PROCEDURE — 97140 MANUAL THERAPY 1/> REGIONS: CPT

## 2021-09-22 PROCEDURE — 97110 THERAPEUTIC EXERCISES: CPT

## 2021-09-22 NOTE — PROGRESS NOTES
Dx: R lateral facet patellofemoral irritation; R gastroc strain, R hip flexor strain, R knee [employer] (Authorized # of Visits): HMO- 60 visit limit.             Authorizing Physician: Dr. Lisa Falk MD visit: none scheduled  Fall Risk: standa increase hip and knee strength to grossly 4+/5 to be able to get up and down from the floor safely   · Pt will demonstrate increased hip ER/ABD strength to 4/5 to perform stepping and squatting activities without excessive femoral IR/ADD   · Pt will improv reps  Modified Kevin Stretch - 1 x daily - 7 x weekly - 2 sets - 1 reps - 30 hold  Seated Hamstring Curl with Anchored Resistance - 1 x daily - 7 x weekly - 2 sets - 10 reps    Charges:  There ex: 2; manual: 1       Total Timed Treatment: 45 min  Total Chirag

## 2021-09-27 ENCOUNTER — APPOINTMENT (OUTPATIENT)
Dept: PHYSICAL THERAPY | Age: 56
End: 2021-09-27
Attending: FAMILY MEDICINE
Payer: COMMERCIAL

## 2021-10-06 ENCOUNTER — OFFICE VISIT (OUTPATIENT)
Dept: PHYSICAL THERAPY | Age: 56
End: 2021-10-06
Attending: FAMILY MEDICINE
Payer: COMMERCIAL

## 2021-10-06 PROCEDURE — 97110 THERAPEUTIC EXERCISES: CPT

## 2021-10-06 PROCEDURE — 97140 MANUAL THERAPY 1/> REGIONS: CPT

## 2021-10-06 NOTE — PROGRESS NOTES
Dx: R lateral facet patellofemoral irritation; R gastroc strain, R hip flexor strain, R knee [employer] (Authorized # of Visits): HMO- 60 visit limit.             Authorizing Physician: Dr. Mel Falk MD visit: none scheduled  Fall Risk: standa knee strength to grossly 4+/5 to be able to get up and down from the floor safely   · Pt will demonstrate increased hip ER/ABD strength to 4/5 to perform stepping and squatting activities without excessive femoral IR/ADD   · Pt will improve SLS to 10s to i watching her mechanics on stairs to reduce strain on hip and normalize stress on the knee. Access Code: [email or web address]  URL: [email or web address]  Date: 09/22/2021  Prepared by: Chiara Sidhu    Exercises  Straight Leg Raise with External Rotation

## 2021-10-11 ENCOUNTER — OFFICE VISIT (OUTPATIENT)
Dept: PHYSICAL THERAPY | Age: 56
End: 2021-10-11
Attending: FAMILY MEDICINE
Payer: COMMERCIAL

## 2021-10-11 PROCEDURE — 97110 THERAPEUTIC EXERCISES: CPT

## 2021-10-11 NOTE — PROGRESS NOTES
Dx: R lateral facet patellofemoral irritation; R gastroc strain, R hip flexor strain, R knee [employer] (Authorized # of Visits): HMO- 60 visit limit.             Authorizing Physician: Dr. Ricki Nix  Next MD visit: none scheduled  Fall Risk: standa will demonstrate increased hip ER/ABD strength to 4/5 to perform stepping and squatting activities without excessive femoral IR/ADD   · Pt will improve SLS to 10s to improve safety with gait on uneven surfaces such as grass and gravel  · Pt will be indepen light UE assist              Manual:   Patellar medial glide gr III 10 reps x 3 sets   Foam roll quad on the R 3 min  Manual:   Patellar medial glide gr III 10 reps x 3 sets   Foam roll quad on the R 3 min  Manual:   Patellar medial glide gr III 10 reps x

## 2021-10-13 ENCOUNTER — OFFICE VISIT (OUTPATIENT)
Dept: PHYSICAL THERAPY | Age: 56
End: 2021-10-13
Attending: FAMILY MEDICINE
Payer: COMMERCIAL

## 2021-10-13 PROCEDURE — 97110 THERAPEUTIC EXERCISES: CPT

## 2021-10-13 NOTE — PROGRESS NOTES
Dx: R lateral facet patellofemoral irritation; R gastroc strain, R hip flexor strain, R knee [employer] (Authorized # of Visits): HMO- 60 visit limit.             Authorizing Physician: Dr. Zeke Barbour  Next MD visit: none scheduled  Fall Risk: standa demonstrate increased hip ER/ABD strength to 4/5 to perform stepping and squatting activities without excessive femoral IR/ADD   · Pt will improve SLS to 10s to improve safety with gait on uneven surfaces such as grass and gravel  · Pt will be independent 10 reps   Double leg heel raises 10 reps x 2 sets - light UE assist           There ex:  SLR 15 reps R  With ER 15 reps R   Hip flexor stretch 30 sec hold x 3 sets on the R   Sit to stand 10 reps x 2 sets  SLS 30 sec R/L x 2 sets   Double leg heel raises 2

## 2021-10-18 ENCOUNTER — OFFICE VISIT (OUTPATIENT)
Dept: PHYSICAL THERAPY | Age: 56
End: 2021-10-18
Attending: FAMILY MEDICINE
Payer: COMMERCIAL

## 2021-10-18 PROCEDURE — 97110 THERAPEUTIC EXERCISES: CPT

## 2021-10-18 NOTE — PROGRESS NOTES
Dx: R lateral facet patellofemoral irritation; R gastroc strain, R hip flexor strain, R knee [employer] (Authorized # of Visits): HMO- 60 visit limit.             Authorizing Physician: Dr. Catherne Angelucci Next MD visit: none scheduled  Fall Risk: standa · Pt will demonstrate increased hip ER/ABD strength to 4/5 to perform stepping and squatting activities without excessive femoral IR/ADD   · Pt will improve SLS to 10s to improve safety with gait on uneven surfaces such as grass and gravel  · Pt will be leg press level 3 10 reps R/L x 2 sets   SLS 30 sec hold with ball roll forward back; medial lateral R/L   Sit to stand raised surface 10 reps   Double leg heel raises 10 reps x 2 sets - light UE assist           There ex:  SLR 15 reps R  With ER 15 reps R weekly - 3 sets - 1 reps - 30 hold  Heel rises with counter support - 1 x daily - 7 x weekly - 3 sets - 10 reps  10/18/2021 added hip abduction green band for relief of *groin pain   Charges:  There ex: 3      Total Timed Treatment: 38 min  Total Treatment

## 2021-10-20 ENCOUNTER — TELEPHONE (OUTPATIENT)
Dept: PHYSICAL THERAPY | Facility: HOSPITAL | Age: 56
End: 2021-10-20

## 2021-11-02 ENCOUNTER — OFFICE VISIT (OUTPATIENT)
Dept: PHYSICAL THERAPY | Age: 56
End: 2021-11-02
Attending: FAMILY MEDICINE
Payer: COMMERCIAL

## 2021-11-02 ENCOUNTER — OFFICE VISIT (OUTPATIENT)
Dept: UROLOGY | Facility: HOSPITAL | Age: 56
End: 2021-11-02
Attending: OBSTETRICS & GYNECOLOGY
Payer: COMMERCIAL

## 2021-11-02 VITALS — WEIGHT: 220 LBS | BODY MASS INDEX: 37.56 KG/M2 | HEIGHT: 64 IN | TEMPERATURE: 97 F

## 2021-11-02 DIAGNOSIS — N95.2 POSTMENOPAUSAL ATROPHIC VAGINITIS: ICD-10-CM

## 2021-11-02 DIAGNOSIS — N81.84 PELVIC MUSCLE WASTING: Primary | ICD-10-CM

## 2021-11-02 DIAGNOSIS — Z98.890 POST-OPERATIVE STATE: ICD-10-CM

## 2021-11-02 PROBLEM — Z23 NEED FOR INFLUENZA VACCINATION: Status: ACTIVE | Noted: 2021-11-02

## 2021-11-02 PROBLEM — R63.5 ABNORMAL WEIGHT GAIN: Status: ACTIVE | Noted: 2021-11-02

## 2021-11-02 PROBLEM — Z78.0 POSTMENOPAUSAL: Status: ACTIVE | Noted: 2021-11-02

## 2021-11-02 PROCEDURE — 99212 OFFICE O/P EST SF 10 MIN: CPT

## 2021-11-02 PROCEDURE — 97110 THERAPEUTIC EXERCISES: CPT

## 2021-11-02 NOTE — PROGRESS NOTES
She is s/p Post-Op Summary  Procedure Date: 11/02/20  Procedure Name: Rolando Sarmiento Sling;Cystoscopy  Post-Op Symptoms: Patient denies pain, LEONARD, UUI, prolapse symptoms, nausea/vomitting, fevers/chills, bleeding, voiding dysfunction, and defecatory dysfunct

## 2021-11-02 NOTE — PROGRESS NOTES
Dx: R lateral facet patellofemoral irritation; R gastroc strain, R hip flexor strain, R knee [employer] (Authorized # of Visits): HMO- 60 visit limit.             Authorizing Physician: Dr. Parham Score  Next MD visit: none scheduled  Fall Risk: standa will improve knee extension ROM to 0 deg to allow proper heel strike during gait and terminal knee extension in stance   · Pt will improve knee AROM flexion to >120 degrees to improve ability to perform stairs/squatting   · Pt will improve quad strength to double leg press level 4 10 reps x 2 sets           There ex:  SLR ER 10 reps x 2 sets   Hip flexor stretch 30 sec hold x 3 sets on the R/L  Bridge 10 reps   Clam shell 10 reps R/L x 2 sets  Seated yellow band HS curls 10 reps R/L  Shuttle double leg press x 3 sets   Foam roll quad on the R 3 min   ITB roll 5 min  Manual:   Patellar medial glide gr III 10 reps x 3 sets  Manual:   Patellar medial glide gr III 10 reps x 3 sets  Manual:   Lateral distraction gr III 10 reps x 3 sets on the R   Long axis distract

## 2021-11-03 ENCOUNTER — LAB ENCOUNTER (OUTPATIENT)
Dept: LAB | Age: 56
End: 2021-11-03
Attending: INTERNAL MEDICINE
Payer: COMMERCIAL

## 2021-11-03 DIAGNOSIS — R74.8 ELEVATED LIVER ENZYMES: ICD-10-CM

## 2021-11-03 DIAGNOSIS — R73.9 HYPERGLYCEMIA: ICD-10-CM

## 2021-11-03 DIAGNOSIS — R63.5 ABNORMAL WEIGHT GAIN: ICD-10-CM

## 2021-11-03 PROCEDURE — 80053 COMPREHEN METABOLIC PANEL: CPT

## 2021-11-03 PROCEDURE — 82533 TOTAL CORTISOL: CPT

## 2021-11-03 PROCEDURE — 36415 COLL VENOUS BLD VENIPUNCTURE: CPT

## 2021-11-03 PROCEDURE — 83036 HEMOGLOBIN GLYCOSYLATED A1C: CPT

## 2021-11-03 PROCEDURE — 84443 ASSAY THYROID STIM HORMONE: CPT

## 2021-11-03 PROCEDURE — 84439 ASSAY OF FREE THYROXINE: CPT

## 2021-11-03 PROCEDURE — 83001 ASSAY OF GONADOTROPIN (FSH): CPT

## 2021-11-03 PROCEDURE — 82024 ASSAY OF ACTH: CPT

## 2021-11-03 PROCEDURE — 80061 LIPID PANEL: CPT

## 2021-11-03 PROCEDURE — 84481 FREE ASSAY (FT-3): CPT

## 2021-11-04 ENCOUNTER — OFFICE VISIT (OUTPATIENT)
Dept: PHYSICAL THERAPY | Age: 56
End: 2021-11-04
Attending: FAMILY MEDICINE
Payer: COMMERCIAL

## 2021-11-04 PROCEDURE — 97110 THERAPEUTIC EXERCISES: CPT

## 2021-11-04 NOTE — PROGRESS NOTES
Dx: R lateral facet patellofemoral irritation; R gastroc strain, R hip flexor strain, R knee [employer] (Authorized # of Visits): HMO- 60 visit limit.             Authorizing Physician: Dr. Kelly Falk MD visit: none scheduled  Fall Risk: standa allow proper heel strike during gait and terminal knee extension in stance   · Pt will improve knee AROM flexion to >120 degrees to improve ability to perform stairs/squatting   · Pt will improve quad strength to 4+/5 to ascend 1 flight of stairs quoc yellow band HS curls 10 reps R/L  Shuttle double leg press level 4 10 reps x 2 sets           There ex:  SLR ER 10 reps x 2 sets   Hip flexor stretch 30 sec hold x 3 sets on the R/L  Bridge 10 reps   Clam shell 10 reps R/L x 2 sets  Seated yellow band HS c press level 5 30 reps   DOuble leg heel raises 10 sec hold 15 reps   Gastroc stretch 30 sec hold x 2 sets R/L   HS stretch 30 sec hold x 2 sets R/L   Quad stretch prone 30 sec hold x 2 sets   Mini squats on // bars 10 reps   Elliptical trial- deferred   ST

## 2021-11-05 ENCOUNTER — TELEPHONE (OUTPATIENT)
Dept: FAMILY MEDICINE CLINIC | Facility: CLINIC | Age: 56
End: 2021-11-05

## 2021-11-05 DIAGNOSIS — E78.00 ELEVATED LDL CHOLESTEROL LEVEL: Primary | ICD-10-CM

## 2021-11-05 RX ORDER — ATORVASTATIN CALCIUM 10 MG/1
10 TABLET, FILM COATED ORAL DAILY
Qty: 90 TABLET | Refills: 0 | Status: SHIPPED | OUTPATIENT
Start: 2021-11-05

## 2021-11-05 NOTE — TELEPHONE ENCOUNTER
----- Message from Muna Caraballo DO sent at 11/5/2021  6:32 AM CDT -----  Can notify Ignacia, that overall her BS, kidney and liver function, look very good, her cholesterol looks good as well, However with her HX of HTN, the guidelines really recommend she

## 2021-11-05 NOTE — TELEPHONE ENCOUNTER
Patient notified and verbalized understanding. Patient is agreeable to starting statin.   Requests script to Curt on lola and aldridge  Advised to let DS know if any new or worsening muscle aches after starting     Future lab ordered   Recall in ep

## 2021-11-11 ENCOUNTER — OFFICE VISIT (OUTPATIENT)
Dept: PHYSICAL THERAPY | Age: 56
End: 2021-11-11
Attending: FAMILY MEDICINE
Payer: COMMERCIAL

## 2021-11-11 ENCOUNTER — PATIENT MESSAGE (OUTPATIENT)
Dept: FAMILY MEDICINE CLINIC | Facility: CLINIC | Age: 56
End: 2021-11-11

## 2021-11-11 DIAGNOSIS — M22.41 CHONDROMALACIA, PATELLA, RIGHT: Primary | ICD-10-CM

## 2021-11-11 DIAGNOSIS — M25.561 CHRONIC PAIN OF RIGHT KNEE: ICD-10-CM

## 2021-11-11 DIAGNOSIS — G89.29 CHRONIC PAIN OF RIGHT KNEE: ICD-10-CM

## 2021-11-11 PROCEDURE — 97110 THERAPEUTIC EXERCISES: CPT

## 2021-11-11 NOTE — PROGRESS NOTES
Dx: R lateral facet patellofemoral irritation; R gastroc strain, R hip flexor strain, R knee [employer] (Authorized # of Visits): O- 60 visit limit.             Authorizing Physician: Dr. Kel Falk MD visit: none scheduled  Fall Risk: standa strength to 4/5 to perform stepping and squatting activities without excessive femoral IR/ADD   · Pt will improve SLS to 10s to improve safety with gait on uneven surfaces such as grass and gravel  · Pt will be independent and compliant with comprehensive for knee straight 10' x 4 laps; attempted with knee flexion- deferred for *pain and forward knee position  Shuttle double leg press level 5 10 reps x 2 sets- VCs throughout  Squatting with HHA 10 reps- VCs throughout      Extensive time spent on education daily - 7 x weekly - 2 sets - 1 reps - 30 hold  Prone Quadriceps Stretch with Strap - 1 x daily - 7 x weekly - 3 sets - 30 hold  Single Leg Stance - 1 x daily - 7 x weekly - 3 sets - 1 reps - 30 hold  Heel rises with counter support - 1 x daily - 7 x weekl

## 2021-11-11 NOTE — TELEPHONE ENCOUNTER
From: Zeus Parks  To: Espinoza Borges DO  Sent: 11/11/2021 1:07 PM CST  Subject: Referral for Dr Jeanne Alvarez    I need to obtain a referral for a follow-up appointment with Dr. Jeanne Alvarez after 2 rounds of physical therapy after my appointment from

## 2021-11-15 ENCOUNTER — OFFICE VISIT (OUTPATIENT)
Dept: PHYSICAL THERAPY | Age: 56
End: 2021-11-15
Attending: FAMILY MEDICINE
Payer: COMMERCIAL

## 2021-11-15 PROCEDURE — 97110 THERAPEUTIC EXERCISES: CPT

## 2021-11-16 NOTE — PROGRESS NOTES
Dx: R lateral facet patellofemoral irritation; R gastroc strain, R hip flexor strain, R knee [employer] (Authorized # of Visits): HMO- 60 visit limit.             Authorizing Physician: Dr. Clarence Maldonado  Next MD visit: November 29th  Fall Risk: standar comprehensive HEP to maintain progress achieved in PT       Plan: Continue per plan of care.  Plan for next therapy session: CKC knee flexion/extension work, standing lateral walks  Date:10/11/2021             TX#: 5/8 Date: 10/13/2021   Tx#: 6/8 10/18/2021 HHA 10 reps- VCs throughout      Extensive time spent on education about gym routine, proper mechanics, impairments still present, chief activity limitations     There ex:  SLR 10 reps x 2 sets   SLR ER 10 reps x 2 sets   Bridge 10 reps   Hip flexor stretc Kimberly    Exercises  Straight Leg Raise with External Rotation - 1 x daily - 7 x weekly - 2 sets - 10 reps  Beginner Bridge - 1 x daily - 7 x weekly - 2 sets - 10 reps  Modified Kevin Stretch - 1 x daily - 7 x weekly - 2 sets - 1 reps - 30 hold  Prone

## 2021-11-22 ENCOUNTER — OFFICE VISIT (OUTPATIENT)
Dept: PHYSICAL THERAPY | Age: 56
End: 2021-11-22
Attending: FAMILY MEDICINE
Payer: COMMERCIAL

## 2021-11-22 PROCEDURE — 97110 THERAPEUTIC EXERCISES: CPT

## 2021-11-22 NOTE — PROGRESS NOTES
Dx: R lateral facet patellofemoral irritation; R gastroc strain, R hip flexor strain, R knee [employer] (Authorized # of Visits): HMO- 60 visit limit.             Authorizing Physician: Dr. Kelly Falk MD visit: November 29th  Fall Risk: standar in stance - MET  · Pt will improve knee AROM flexion to >120 degrees to improve ability to perform stairs/squatting - MET  · Pt will improve quad strength to 4+/5 to ascend 1 flight of stairs reciprocally without UE assist - MET  · Pt will increase hip and laps; attempted with knee flexion- deferred for *pain and forward knee position  Shuttle double leg press level 5 10 reps x 2 sets- VCs throughout  Squatting with HHA 10 reps- VCs throughout      Extensive time spent on education about gym routine, proper 3 sets on the R     Reassessment 10 min    Manual:   Lateral distraction gr III 10 reps x 3 sets on the R   Long axis distraction 10 sec hold 5 reps   STM rolling hip flexor          Reassessment 10 min         HEP for: Access Code: [email or web address]  URL: https://

## 2021-12-01 ENCOUNTER — OFFICE VISIT (OUTPATIENT)
Dept: PHYSICAL THERAPY | Age: 56
End: 2021-12-01
Attending: FAMILY MEDICINE
Payer: COMMERCIAL

## 2021-12-01 PROCEDURE — 97110 THERAPEUTIC EXERCISES: CPT

## 2021-12-01 NOTE — PROGRESS NOTES
Dx: R lateral facet patellofemoral irritation; R gastroc strain, R hip flexor strain, R knee [employer] (Authorized # of Visits): HMO- 60 visit limit.             Authorizing Physician: Dr. Breezy Joy  Next MD visit: November 29th  Fall Risk: standar will improve knee extension ROM to 0 deg to allow proper heel strike during gait and terminal knee extension in stance - MET  · Pt will improve knee AROM flexion to >120 degrees to improve ability to perform stairs/squatting - MET  · Pt will improve quad s ups 6 inch 10 reps on the R    There ex:  SLR 10 reps; ER 10 reps   Standing lateral walks- VCs for knee straight 10' x 4 laps; attempted with knee flexion- deferred for *pain and forward knee position  Shuttle double leg press level 5 10 reps x 2 sets- VC over strap stretch   ITB cross leg supine pullover stretch 10 sec hold 5 reps on the R  Prone quad stretch 30 sec hold x 3 sets on the R     Reassessment 10 min  There ex:  Reassessment 15 min   Pt education: DOC rivera on machine- set up, form, mechanics, r

## 2022-02-08 ENCOUNTER — LAB ENCOUNTER (OUTPATIENT)
Dept: LAB | Age: 57
End: 2022-02-08
Attending: FAMILY MEDICINE
Payer: COMMERCIAL

## 2022-02-08 DIAGNOSIS — E78.00 ELEVATED LDL CHOLESTEROL LEVEL: ICD-10-CM

## 2022-02-08 LAB
CHOLEST SERPL-MCNC: 136 MG/DL (ref ?–200)
FASTING PATIENT LIPID ANSWER: YES
HDLC SERPL-MCNC: 45 MG/DL (ref 40–59)
LDLC SERPL CALC-MCNC: 71 MG/DL (ref ?–100)
NONHDLC SERPL-MCNC: 91 MG/DL (ref ?–130)
TRIGL SERPL-MCNC: 110 MG/DL (ref 30–149)
VLDLC SERPL CALC-MCNC: 17 MG/DL (ref 0–30)

## 2022-02-08 PROCEDURE — 80061 LIPID PANEL: CPT

## 2022-02-08 PROCEDURE — 36415 COLL VENOUS BLD VENIPUNCTURE: CPT

## 2022-02-09 ENCOUNTER — PATIENT MESSAGE (OUTPATIENT)
Dept: FAMILY MEDICINE CLINIC | Facility: CLINIC | Age: 57
End: 2022-02-09

## 2022-02-09 ENCOUNTER — TELEPHONE (OUTPATIENT)
Dept: FAMILY MEDICINE CLINIC | Facility: CLINIC | Age: 57
End: 2022-02-09

## 2022-02-09 RX ORDER — ATORVASTATIN CALCIUM 10 MG/1
10 TABLET, FILM COATED ORAL DAILY
Qty: 90 TABLET | Refills: 1 | Status: SHIPPED | OUTPATIENT
Start: 2022-02-09

## 2022-02-09 NOTE — TELEPHONE ENCOUNTER
From: Alcira Aguila  To: Denae Segundo DO  Sent: 2/9/2022 1:52 PM CST  Subject: Question regarding LIPID PANEL    Should I continue taking the statin based on my results? I just took my last pill last night and need a refill if I do.

## 2022-02-09 NOTE — TELEPHONE ENCOUNTER
----- Message from Joe Bay DO sent at 2/9/2022 11:57 AM CST -----  Ty Rincon, this is EXCELLENT, lets keep everything the same and recall in 6 months

## 2022-03-16 ENCOUNTER — HOSPITAL ENCOUNTER (EMERGENCY)
Age: 57
Discharge: HOME | End: 2022-03-16
Payer: COMMERCIAL

## 2022-03-16 ENCOUNTER — APPOINTMENT (OUTPATIENT)
Dept: GENERAL RADIOLOGY | Age: 57
End: 2022-03-16
Attending: PHYSICIAN ASSISTANT
Payer: COMMERCIAL

## 2022-03-16 VITALS
OXYGEN SATURATION: 99 % | WEIGHT: 225 LBS | TEMPERATURE: 98 F | DIASTOLIC BLOOD PRESSURE: 82 MMHG | SYSTOLIC BLOOD PRESSURE: 126 MMHG | HEIGHT: 64 IN | RESPIRATION RATE: 18 BRPM | BODY MASS INDEX: 38.41 KG/M2 | HEART RATE: 98 BPM

## 2022-03-16 DIAGNOSIS — S13.4XXA WHIPLASH INJURY TO NECK, INITIAL ENCOUNTER: ICD-10-CM
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
## 2022-03-16 PROCEDURE — 99283 EMERGENCY DEPT VISIT LOW MDM: CPT

## 2022-03-16 PROCEDURE — 72050 X-RAY EXAM NECK SPINE 4/5VWS: CPT | Performed by: PHYSICIAN ASSISTANT

## 2022-03-16 RX ORDER — METHOCARBAMOL 750 MG/1
750 TABLET, FILM COATED ORAL 4 TIMES DAILY
Qty: 21 TABLET | Refills: 0 | Status: SHIPPED | OUTPATIENT
Start: 2022-03-16 | End: 2022-03-30 | Stop reason: ALTCHOICE

## 2022-03-16 RX ORDER — IBUPROFEN 800 MG/1
800 TABLET ORAL ONCE
Status: COMPLETED | OUTPATIENT
Start: 2022-03-16 | End: 2022-03-16

## 2022-03-16 RX ORDER — NAPROXEN 500 MG/1
500 TABLET ORAL 2 TIMES DAILY PRN
Qty: 20 TABLET | Refills: 0 | Status: SHIPPED | OUTPATIENT
Start: 2022-03-16 | End: 2022-03-23
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
## 2022-03-22 ENCOUNTER — TELEPHONE (OUTPATIENT)
Dept: FAMILY MEDICINE CLINIC | Facility: CLINIC | Age: 57
End: 2022-03-22

## 2022-03-22 NOTE — TELEPHONE ENCOUNTER
Pt was advised    Future Appointments   Date Time Provider Matt Rey   3/30/2022  1:00 PM Lanie German [facility] [place]

## 2022-03-22 NOTE — TELEPHONE ENCOUNTER
Dr. Adeline Perera received consult note from [address] [employer]- pt reports pulsing sensation in [place] Greek    Dr. Adeline Perera recommends appoointmnet

## 2022-03-30 ENCOUNTER — OFFICE VISIT (OUTPATIENT)
Dept: FAMILY MEDICINE CLINIC | Facility: CLINIC | Age: 57
End: 2022-03-30
Payer: COMMERCIAL

## 2022-03-30 VITALS
HEART RATE: 96 BPM | BODY MASS INDEX: 40 KG/M2 | DIASTOLIC BLOOD PRESSURE: 82 MMHG | SYSTOLIC BLOOD PRESSURE: 132 MMHG | WEIGHT: 233.81 LBS | TEMPERATURE: 98 F | RESPIRATION RATE: 16 BRPM

## 2022-03-30 DIAGNOSIS — H53.9 VISUAL DISTURBANCE OF ONE EYE: ICD-10-CM

## 2022-03-30 DIAGNOSIS — M17.0 PRIMARY OSTEOARTHRITIS OF BOTH KNEES: ICD-10-CM

## 2022-03-30 DIAGNOSIS — G43.109 OCULAR MIGRAINE: Primary | ICD-10-CM

## 2022-03-30 PROCEDURE — 3079F DIAST BP 80-89 MM HG: CPT | Performed by: FAMILY MEDICINE

## 2022-03-30 PROCEDURE — 99214 OFFICE O/P EST MOD 30 MIN: CPT | Performed by: FAMILY MEDICINE

## 2022-03-30 PROCEDURE — 3075F SYST BP GE 130 - 139MM HG: CPT | Performed by: FAMILY MEDICINE

## 2022-03-30 RX ORDER — MELOXICAM 7.5 MG/1
7.5 TABLET ORAL DAILY
Qty: 30 TABLET | Refills: 0 | Status: SHIPPED | OUTPATIENT
Start: 2022-03-30 | End: 2022-04-29

## 2022-04-01 RX ORDER — METOPROLOL SUCCINATE 100 MG/1
TABLET, EXTENDED RELEASE ORAL
Qty: 180 TABLET | Refills: 2 | Status: SHIPPED | OUTPATIENT
Start: 2022-04-01

## 2022-04-01 NOTE — TELEPHONE ENCOUNTER
Hypertension Medications Protocol Passed 04/01/2022 07:42 AM   Protocol Details  CMP or BMP in past 12 months    Last serum creatinine< 2.0    Appointment in past 6 or next 3 months        Refilled per protocol  metoprolol succinate 100 MG Oral Tablet 24 Hr  Last refilled on 6/21/21 #180 with 2 rf.   LOV- 3/30/22  Last labs- 2/8/22    Sent to pharmacy

## 2022-04-25 ENCOUNTER — TELEPHONE (OUTPATIENT)
Dept: FAMILY MEDICINE CLINIC | Facility: CLINIC | Age: 57
End: 2022-04-25

## 2022-04-25 NOTE — TELEPHONE ENCOUNTER
PT CALLED AND ADV HAS AN APPOINTMENT TOMORROW WITH DR Breck Cabot. ADV OFFICE WAS WAITING ON REFERRAL.     PLEASE PLACE REFERRAL     THANK YOU

## 2022-04-27 ENCOUNTER — MED REC SCAN ONLY (OUTPATIENT)
Dept: FAMILY MEDICINE CLINIC | Facility: CLINIC | Age: 57
End: 2022-04-27

## 2022-05-03 ENCOUNTER — TELEPHONE (OUTPATIENT)
Dept: FAMILY MEDICINE CLINIC | Facility: CLINIC | Age: 57
End: 2022-05-03

## 2022-05-03 NOTE — TELEPHONE ENCOUNTER
Angela Ingram from Dr. Saba Suarez office called they are needing a referral placed for pt. She would like to get an appointment with him ASAP. Can  please place referral? Can we please fax it to them at [phone].   Thanks

## 2022-05-16 ENCOUNTER — MED REC SCAN ONLY (OUTPATIENT)
Dept: FAMILY MEDICINE CLINIC | Facility: CLINIC | Age: 57
End: 2022-05-16

## 2022-05-16 ENCOUNTER — TELEPHONE (OUTPATIENT)
Dept: FAMILY MEDICINE CLINIC | Facility: CLINIC | Age: 57
End: 2022-05-16

## 2022-05-16 NOTE — TELEPHONE ENCOUNTER
Please notify Leslie Tapia, I got the consult letter form the Opthamologist, and he suggested she see Dr. Ernst Mares a Neuro-Ophthalmologist, for her eye issues, I have placed the referral she can call and schedule the appt

## 2022-05-16 NOTE — TELEPHONE ENCOUNTER
Left message for the pt that the consult report was recevied and they recommend she see Dr. Rodrigue Lemus- I left the number on the VM- advised that it will take a little bit to get approved so we will call once it is approved- advised to call and schedule about 10 days out    If questions call the office

## 2022-05-17 NOTE — TELEPHONE ENCOUNTER
----- Message from Sherlyn Norris DO sent at 7/24/2018  8:04 AM CDT -----  Can notify Haile Hall, her cholesterol looks good,her thyroid is good as well, she is not anemic, but her iron stores are low, and that may  have something to do with the creeping sensati
PT advised of results- verbalized understanding.     Future orders placed
17-May-2022 02:24

## 2022-08-17 ENCOUNTER — PATIENT MESSAGE (OUTPATIENT)
Dept: FAMILY MEDICINE CLINIC | Facility: CLINIC | Age: 57
End: 2022-08-17

## 2022-08-17 DIAGNOSIS — H53.9 VISUAL DISTURBANCE OF ONE EYE: ICD-10-CM

## 2022-08-17 DIAGNOSIS — G43.109 OCULAR MIGRAINE: Primary | ICD-10-CM

## 2022-08-17 NOTE — TELEPHONE ENCOUNTER
From: Mary Carter  Sent: 2022 5:02 PM CDT  To: Steve Alfaro RN  Subject: Referral for Dr Kyree Dean     ----- Message from Neal Schneider RN sent at 2022 5:02 PM CDT -----       ----- Message from Roque Garcia to Electa Bence, DO sent at 2022 5:01 PM -----   Is the referral sent to the doctor's office?      ----- Message -----   From:Stephanie WILKINSON   Sent:2022 4:56 PM CDT   To:Ignacia Portillo   Subject:Referral for Dr Bob Melendez can see that a referral was placed in May of this year and that it  2022. So the referral just . I placed a new referral today for you- they should be able to use that for your upcoming visit. Thank you  Mo Young RN to Dr. Robert Vogt      ----- Message -----   From:Ignacia Curry   Sent:2022 2:34 PM CDT   To:Saad Devine DO   Subject:Referral for Dr Kyree Dean     I just received a call from Dr. Kyree Dean' office [phone] stating they have not received the referral. Can you confirm if this has been sent?   Thanks,  [employer]

## 2022-08-17 NOTE — TELEPHONE ENCOUNTER
From: Tino Grant  To: Jackelyn Kendrick DO  Sent: 8/17/2022 2:34 PM CDT  Subject: Referral for Dr Samantha Hernandez     I just received a call from Dr. Samantha Hernandez' office [phone] stating they have not received the referral. Can you confirm if this has been sent?   Thanks,  [employer]

## 2022-08-25 ENCOUNTER — MED REC SCAN ONLY (OUTPATIENT)
Dept: FAMILY MEDICINE CLINIC | Facility: CLINIC | Age: 57
End: 2022-08-25

## 2022-09-14 ENCOUNTER — NURSE ONLY (OUTPATIENT)
Dept: FAMILY MEDICINE CLINIC | Facility: CLINIC | Age: 57
End: 2022-09-14
Payer: COMMERCIAL

## 2022-09-14 DIAGNOSIS — E78.2 MIXED HYPERLIPIDEMIA: ICD-10-CM

## 2022-09-14 DIAGNOSIS — E78.00 ELEVATED LDL CHOLESTEROL LEVEL: ICD-10-CM

## 2022-09-14 LAB
CHOLEST SERPL-MCNC: 126 MG/DL (ref ?–200)
FASTING PATIENT LIPID ANSWER: YES
HDLC SERPL-MCNC: 44 MG/DL (ref 40–59)
LDLC SERPL CALC-MCNC: 65 MG/DL (ref ?–100)
NONHDLC SERPL-MCNC: 82 MG/DL (ref ?–130)
TRIGL SERPL-MCNC: 87 MG/DL (ref 30–149)
VLDLC SERPL CALC-MCNC: 13 MG/DL (ref 0–30)

## 2022-09-14 PROCEDURE — 80061 LIPID PANEL: CPT | Performed by: FAMILY MEDICINE

## 2022-09-14 NOTE — PROGRESS NOTES
Pt was in office for lipids for follow up    1 mint tube collected from [facility] using straight needle and 1 attempt    Pt tolerated and was sent home in stable condition

## 2022-09-15 DIAGNOSIS — E78.2 MIXED HYPERLIPIDEMIA: Primary | ICD-10-CM

## 2022-09-15 RX ORDER — ATORVASTATIN CALCIUM 10 MG/1
10 TABLET, FILM COATED ORAL DAILY
Qty: 90 TABLET | Refills: 1 | Status: SHIPPED | OUTPATIENT
Start: 2022-09-15

## 2022-09-15 NOTE — TELEPHONE ENCOUNTER
----- Message from Lorna Bell DO sent at 9/15/2022  8:02 AM CDT -----  Can notify Emily Jesus, her cholesterol looks good, maybe too good.  I think she can cut her Atorvastatin in half, and still be good with where her number are and recheck in 6 months

## 2022-09-15 NOTE — TELEPHONE ENCOUNTER
Spoke with patient, aware of results and recommendations. Cholesterol Medication Protocol Passed 09/15/2022 09:17 AM   Protocol Details  ALT < 80    ALT resulted within past year    Lipid panel within past 12 months    Appointment within past 12 or next 3 m     Refilled per protocol  atorvastatin 10 MG Oral Tab  Last refilled on 2/9/22 #90 with 1 rf.   LOV- 3/30/22  Last labs- 9/15/22    Sent to pharmacy

## 2022-09-24 ENCOUNTER — LAB ENCOUNTER (OUTPATIENT)
Dept: LAB | Age: 57
End: 2022-09-24
Attending: INTERNAL MEDICINE

## 2022-09-24 DIAGNOSIS — Z01.818 PRE-OP TESTING: ICD-10-CM

## 2022-09-25 LAB — SARS-COV-2 RNA RESP QL NAA+PROBE: NOT DETECTED

## 2022-09-27 PROBLEM — Z80.0 FAMILY HISTORY OF COLON CANCER: Status: ACTIVE | Noted: 2022-09-27

## 2022-09-27 PROBLEM — R12 HEARTBURN: Status: ACTIVE | Noted: 2022-09-27

## 2022-09-27 PROBLEM — Z86.0101 HISTORY OF ADENOMATOUS POLYP OF COLON: Status: ACTIVE | Noted: 2022-09-27

## 2022-09-27 PROBLEM — R68.81 EARLY SATIETY: Status: ACTIVE | Noted: 2022-09-27

## 2022-09-27 PROBLEM — D12.3 BENIGN NEOPLASM OF TRANSVERSE COLON: Status: ACTIVE | Noted: 2022-09-27

## 2022-09-27 PROBLEM — K63.5 POLYP OF COLON: Status: ACTIVE | Noted: 2022-09-27

## 2022-09-27 PROBLEM — Z86.010 HISTORY OF ADENOMATOUS POLYP OF COLON: Status: ACTIVE | Noted: 2022-09-27

## 2022-09-27 PROBLEM — R13.14 DYSPHAGIA, PHARYNGOESOPHAGEAL PHASE: Status: ACTIVE | Noted: 2022-09-27

## 2022-09-27 PROBLEM — K31.7 GASTRIC POLYPS: Status: ACTIVE | Noted: 2022-09-27

## 2022-09-27 PROBLEM — K29.60 OTHER GASTRITIS WITHOUT BLEEDING: Status: ACTIVE | Noted: 2022-09-27

## 2022-09-27 PROCEDURE — 88305 TISSUE EXAM BY PATHOLOGIST: CPT | Performed by: INTERNAL MEDICINE

## 2022-10-20 ENCOUNTER — OFFICE VISIT (OUTPATIENT)
Dept: FAMILY MEDICINE CLINIC | Facility: CLINIC | Age: 57
End: 2022-10-20
Payer: COMMERCIAL

## 2022-10-20 VITALS
BODY MASS INDEX: 39.78 KG/M2 | TEMPERATURE: 98 F | DIASTOLIC BLOOD PRESSURE: 82 MMHG | RESPIRATION RATE: 18 BRPM | HEIGHT: 64 IN | OXYGEN SATURATION: 98 % | HEART RATE: 101 BPM | WEIGHT: 233 LBS | SYSTOLIC BLOOD PRESSURE: 134 MMHG

## 2022-10-20 DIAGNOSIS — K75.81 NASH (NONALCOHOLIC STEATOHEPATITIS): ICD-10-CM

## 2022-10-20 DIAGNOSIS — H53.9 VISUAL DISTURBANCE: ICD-10-CM

## 2022-10-20 DIAGNOSIS — Z00.00 ROUTINE HEALTH MAINTENANCE: Primary | ICD-10-CM

## 2022-10-20 DIAGNOSIS — Z12.31 ENCOUNTER FOR SCREENING MAMMOGRAM FOR BREAST CANCER: ICD-10-CM

## 2022-10-20 DIAGNOSIS — H05.263: ICD-10-CM

## 2022-10-20 DIAGNOSIS — I89.0 LYMPHEDEMA OF BOTH LOWER EXTREMITIES: ICD-10-CM

## 2022-10-20 DIAGNOSIS — N81.10 BLADDER PROLAPSE, FEMALE, ACQUIRED: ICD-10-CM

## 2022-10-20 DIAGNOSIS — E66.09 CLASS 2 OBESITY DUE TO EXCESS CALORIES WITHOUT SERIOUS COMORBIDITY WITH BODY MASS INDEX (BMI) OF 36.0 TO 36.9 IN ADULT: ICD-10-CM

## 2022-10-20 LAB
ALBUMIN SERPL-MCNC: 3.5 G/DL (ref 3.4–5)
ALBUMIN/GLOB SERPL: 0.8 {RATIO} (ref 1–2)
ALP LIVER SERPL-CCNC: 140 U/L
ALT SERPL-CCNC: 49 U/L
ANION GAP SERPL CALC-SCNC: 6 MMOL/L (ref 0–18)
AST SERPL-CCNC: 35 U/L (ref 15–37)
BASOPHILS # BLD AUTO: 0.03 X10(3) UL (ref 0–0.2)
BASOPHILS NFR BLD AUTO: 0.4 %
BILIRUB SERPL-MCNC: 0.6 MG/DL (ref 0.1–2)
BUN BLD-MCNC: 10 MG/DL (ref 7–18)
CALCIUM BLD-MCNC: 10.2 MG/DL (ref 8.5–10.1)
CHLORIDE SERPL-SCNC: 107 MMOL/L (ref 98–112)
CO2 SERPL-SCNC: 27 MMOL/L (ref 21–32)
CREAT BLD-MCNC: 1.16 MG/DL
EOSINOPHIL # BLD AUTO: 0.24 X10(3) UL (ref 0–0.7)
EOSINOPHIL NFR BLD AUTO: 3.2 %
ERYTHROCYTE [DISTWIDTH] IN BLOOD BY AUTOMATED COUNT: 12.8 %
FASTING STATUS PATIENT QL REPORTED: YES
GFR SERPLBLD BASED ON 1.73 SQ M-ARVRAT: 55 ML/MIN/1.73M2 (ref 60–?)
GLOBULIN PLAS-MCNC: 4.5 G/DL (ref 2.8–4.4)
GLUCOSE BLD-MCNC: 117 MG/DL (ref 70–99)
HCT VFR BLD AUTO: 40 %
HGB BLD-MCNC: 13.1 G/DL
IMM GRANULOCYTES # BLD AUTO: 0.02 X10(3) UL (ref 0–1)
IMM GRANULOCYTES NFR BLD: 0.3 %
LYMPHOCYTES # BLD AUTO: 1.77 X10(3) UL (ref 1–4)
LYMPHOCYTES NFR BLD AUTO: 23.7 %
MCH RBC QN AUTO: 30.6 PG (ref 26–34)
MCHC RBC AUTO-ENTMCNC: 32.8 G/DL (ref 31–37)
MCV RBC AUTO: 93.5 FL
MONOCYTES # BLD AUTO: 0.55 X10(3) UL (ref 0.1–1)
MONOCYTES NFR BLD AUTO: 7.4 %
NEUTROPHILS # BLD AUTO: 4.85 X10 (3) UL (ref 1.5–7.7)
NEUTROPHILS # BLD AUTO: 4.85 X10(3) UL (ref 1.5–7.7)
NEUTROPHILS NFR BLD AUTO: 65 %
OSMOLALITY SERPL CALC.SUM OF ELEC: 290 MOSM/KG (ref 275–295)
PLATELET # BLD AUTO: 384 10(3)UL (ref 150–450)
POTASSIUM SERPL-SCNC: 3.8 MMOL/L (ref 3.5–5.1)
PROT SERPL-MCNC: 8 G/DL (ref 6.4–8.2)
RBC # BLD AUTO: 4.28 X10(6)UL
SODIUM SERPL-SCNC: 140 MMOL/L (ref 136–145)
T4 FREE SERPL-MCNC: 0.9 NG/DL (ref 0.8–1.7)
TSI SER-ACNC: 3.48 MIU/ML (ref 0.36–3.74)
WBC # BLD AUTO: 7.5 X10(3) UL (ref 4–11)

## 2022-10-20 PROCEDURE — 90471 IMMUNIZATION ADMIN: CPT | Performed by: FAMILY MEDICINE

## 2022-10-20 PROCEDURE — 90686 IIV4 VACC NO PRSV 0.5 ML IM: CPT | Performed by: FAMILY MEDICINE

## 2022-10-20 PROCEDURE — 3075F SYST BP GE 130 - 139MM HG: CPT | Performed by: FAMILY MEDICINE

## 2022-10-20 PROCEDURE — 83036 HEMOGLOBIN GLYCOSYLATED A1C: CPT | Performed by: FAMILY MEDICINE

## 2022-10-20 PROCEDURE — 80053 COMPREHEN METABOLIC PANEL: CPT | Performed by: FAMILY MEDICINE

## 2022-10-20 PROCEDURE — 3079F DIAST BP 80-89 MM HG: CPT | Performed by: FAMILY MEDICINE

## 2022-10-20 PROCEDURE — 84439 ASSAY OF FREE THYROXINE: CPT | Performed by: FAMILY MEDICINE

## 2022-10-20 PROCEDURE — 90677 PCV20 VACCINE IM: CPT | Performed by: FAMILY MEDICINE

## 2022-10-20 PROCEDURE — 3008F BODY MASS INDEX DOCD: CPT | Performed by: FAMILY MEDICINE

## 2022-10-20 PROCEDURE — 84443 ASSAY THYROID STIM HORMONE: CPT | Performed by: FAMILY MEDICINE

## 2022-10-20 PROCEDURE — 85025 COMPLETE CBC W/AUTO DIFF WBC: CPT | Performed by: FAMILY MEDICINE

## 2022-10-20 PROCEDURE — 99396 PREV VISIT EST AGE 40-64: CPT | Performed by: FAMILY MEDICINE

## 2022-10-20 PROCEDURE — 90472 IMMUNIZATION ADMIN EACH ADD: CPT | Performed by: FAMILY MEDICINE

## 2022-10-21 ENCOUNTER — TELEPHONE (OUTPATIENT)
Dept: FAMILY MEDICINE CLINIC | Facility: CLINIC | Age: 57
End: 2022-10-21

## 2022-10-21 DIAGNOSIS — R73.09 ELEVATED GLUCOSE: Primary | ICD-10-CM

## 2022-10-21 LAB
EST. AVERAGE GLUCOSE BLD GHB EST-MCNC: 117 MG/DL (ref 68–126)
HBA1C MFR BLD: 5.7 % (ref ?–5.7)

## 2022-10-27 ENCOUNTER — PATIENT MESSAGE (OUTPATIENT)
Dept: FAMILY MEDICINE CLINIC | Facility: CLINIC | Age: 57
End: 2022-10-27

## 2022-10-27 NOTE — TELEPHONE ENCOUNTER
From: Fauzia Stevens  To: Sayda Story DO  Sent: 10/27/2022 10:55 AM CDT  Subject: Referral to [facility]     I received a call from Driss at [facility] about the referral to [address] Nii Mota. She said the doctor is booked into the new year and suggested making the referral for any of the doctors at [facility] to be seen this year unless you really want me to be seen by her.

## 2022-10-27 NOTE — TELEPHONE ENCOUNTER
From: Yocasta Gil  Sent: 10/27/2022 3:22 PM CDT  To: Haylee Colon Clinical Staff  Subject: Referral to [facility]     I have an appointment scheduled for 11/21 with Dr. Brennan Dumont for the updated referral.   Thanks, Dayron Gunn

## 2022-10-29 ENCOUNTER — LAB ENCOUNTER (OUTPATIENT)
Dept: LAB | Age: 57
End: 2022-10-29
Attending: INTERNAL MEDICINE
Payer: COMMERCIAL

## 2022-10-29 DIAGNOSIS — R13.12 OROPHARYNGEAL DYSPHAGIA: ICD-10-CM

## 2022-10-30 LAB — SARS-COV-2 RNA RESP QL NAA+PROBE: NOT DETECTED

## 2022-11-01 ENCOUNTER — HOSPITAL ENCOUNTER (OUTPATIENT)
Dept: GENERAL RADIOLOGY | Facility: HOSPITAL | Age: 57
Discharge: HOME | End: 2022-11-01
Attending: INTERNAL MEDICINE
Payer: COMMERCIAL

## 2022-11-01 DIAGNOSIS — R13.12 OROPHARYNGEAL DYSPHAGIA: ICD-10-CM

## 2022-11-01 PROCEDURE — 74230 X-RAY XM SWLNG FUNCJ C+: CPT | Performed by: INTERNAL MEDICINE

## 2022-11-01 PROCEDURE — 92611 MOTION FLUOROSCOPY/SWALLOW: CPT

## 2022-11-09 ENCOUNTER — HOSPITAL ENCOUNTER (OUTPATIENT)
Dept: MAMMOGRAPHY | Age: 57
Discharge: HOME | End: 2022-11-09
Attending: FAMILY MEDICINE
Payer: COMMERCIAL

## 2022-11-09 DIAGNOSIS — Z12.31 ENCOUNTER FOR SCREENING MAMMOGRAM FOR BREAST CANCER: ICD-10-CM

## 2022-11-09 PROCEDURE — 77063 BREAST TOMOSYNTHESIS BI: CPT | Performed by: FAMILY MEDICINE

## 2022-11-09 PROCEDURE — 77067 SCR MAMMO BI INCL CAD: CPT | Performed by: FAMILY MEDICINE

## 2022-11-16 ENCOUNTER — TELEPHONE (OUTPATIENT)
Dept: FAMILY MEDICINE CLINIC | Facility: CLINIC | Age: 57
End: 2022-11-16

## 2022-11-16 DIAGNOSIS — H53.9 VISUAL DISTURBANCE: Primary | ICD-10-CM

## 2022-11-16 DIAGNOSIS — H05.263: ICD-10-CM

## 2022-11-16 NOTE — TELEPHONE ENCOUNTER
Patient was seen in our office 10/20    She thought at the time, a referral was placed for her follow up visit with dr Candace Lord, neurophthalmologist    She is not seeing the referral and her appt is tomorrow    Please adv    Thank you

## 2022-12-01 ENCOUNTER — MED REC SCAN ONLY (OUTPATIENT)
Dept: FAMILY MEDICINE CLINIC | Facility: CLINIC | Age: 57
End: 2022-12-01

## 2023-01-10 ENCOUNTER — OFFICE VISIT (OUTPATIENT)
Dept: PHYSICAL THERAPY | Age: 58
End: 2023-01-10
Attending: FAMILY MEDICINE
Payer: COMMERCIAL

## 2023-01-10 ENCOUNTER — TELEPHONE (OUTPATIENT)
Dept: PHYSICAL THERAPY | Facility: HOSPITAL | Age: 58
End: 2023-01-10

## 2023-01-10 PROCEDURE — 97161 PT EVAL LOW COMPLEX 20 MIN: CPT | Performed by: PHYSICAL THERAPIST

## 2023-01-10 PROCEDURE — 97140 MANUAL THERAPY 1/> REGIONS: CPT | Performed by: PHYSICAL THERAPIST

## 2023-01-10 PROCEDURE — 97016 VASOPNEUMATIC DEVICE THERAPY: CPT | Performed by: PHYSICAL THERAPIST

## 2023-01-12 ENCOUNTER — OFFICE VISIT (OUTPATIENT)
Dept: PHYSICAL THERAPY | Age: 58
End: 2023-01-12
Attending: FAMILY MEDICINE
Payer: COMMERCIAL

## 2023-01-12 PROCEDURE — 97140 MANUAL THERAPY 1/> REGIONS: CPT | Performed by: PHYSICAL THERAPIST

## 2023-01-12 PROCEDURE — 97110 THERAPEUTIC EXERCISES: CPT | Performed by: PHYSICAL THERAPIST

## 2023-01-16 ENCOUNTER — TELEPHONE (OUTPATIENT)
Dept: FAMILY MEDICINE CLINIC | Facility: CLINIC | Age: 58
End: 2023-01-16

## 2023-01-16 DIAGNOSIS — I89.0 LYMPHEDEMA OF BOTH LOWER EXTREMITIES: Primary | ICD-10-CM

## 2023-01-16 NOTE — TELEPHONE ENCOUNTER
PT is request referrals for compression garmets and pneumatic pump    Custom Compression Garment Qty 8 - [facility]    Pneumatic Pump from [employer]

## 2023-01-17 ENCOUNTER — OFFICE VISIT (OUTPATIENT)
Dept: PHYSICAL THERAPY | Age: 58
End: 2023-01-17
Attending: FAMILY MEDICINE
Payer: COMMERCIAL

## 2023-01-17 PROCEDURE — 97140 MANUAL THERAPY 1/> REGIONS: CPT | Performed by: PHYSICAL THERAPIST

## 2023-01-17 PROCEDURE — 97110 THERAPEUTIC EXERCISES: CPT | Performed by: PHYSICAL THERAPIST

## 2023-01-17 PROCEDURE — 97016 VASOPNEUMATIC DEVICE THERAPY: CPT | Performed by: PHYSICAL THERAPIST

## 2023-01-19 ENCOUNTER — OFFICE VISIT (OUTPATIENT)
Dept: PHYSICAL THERAPY | Age: 58
End: 2023-01-19
Attending: FAMILY MEDICINE
Payer: COMMERCIAL

## 2023-01-19 PROCEDURE — 97110 THERAPEUTIC EXERCISES: CPT | Performed by: PHYSICAL THERAPIST

## 2023-01-19 PROCEDURE — 97140 MANUAL THERAPY 1/> REGIONS: CPT | Performed by: PHYSICAL THERAPIST

## 2023-01-19 PROCEDURE — 97016 VASOPNEUMATIC DEVICE THERAPY: CPT | Performed by: PHYSICAL THERAPIST

## 2023-01-20 ENCOUNTER — OFFICE VISIT (OUTPATIENT)
Dept: PHYSICAL THERAPY | Age: 58
End: 2023-01-20
Attending: FAMILY MEDICINE
Payer: COMMERCIAL

## 2023-01-20 PROCEDURE — 97016 VASOPNEUMATIC DEVICE THERAPY: CPT | Performed by: PHYSICAL THERAPIST

## 2023-01-20 PROCEDURE — 97110 THERAPEUTIC EXERCISES: CPT | Performed by: PHYSICAL THERAPIST

## 2023-01-20 PROCEDURE — 97140 MANUAL THERAPY 1/> REGIONS: CPT | Performed by: PHYSICAL THERAPIST

## 2023-01-24 ENCOUNTER — OFFICE VISIT (OUTPATIENT)
Dept: PHYSICAL THERAPY | Age: 58
End: 2023-01-24
Attending: FAMILY MEDICINE
Payer: COMMERCIAL

## 2023-01-24 PROCEDURE — 97140 MANUAL THERAPY 1/> REGIONS: CPT | Performed by: PHYSICAL THERAPIST

## 2023-01-24 PROCEDURE — 97016 VASOPNEUMATIC DEVICE THERAPY: CPT | Performed by: PHYSICAL THERAPIST

## 2023-01-24 PROCEDURE — 97110 THERAPEUTIC EXERCISES: CPT | Performed by: PHYSICAL THERAPIST

## 2023-01-25 ENCOUNTER — TELEPHONE (OUTPATIENT)
Dept: FAMILY MEDICINE CLINIC | Facility: CLINIC | Age: 58
End: 2023-01-25

## 2023-01-25 NOTE — TELEPHONE ENCOUNTER
[facility] is requesting letter for pt to support compression device    RN templates letter in communications    Communication pended and sent to provider to review    Please route back to RN to fax to [facility]

## 2023-01-26 ENCOUNTER — OFFICE VISIT (OUTPATIENT)
Dept: PHYSICAL THERAPY | Age: 58
End: 2023-01-26
Attending: FAMILY MEDICINE
Payer: COMMERCIAL

## 2023-01-26 PROCEDURE — 97016 VASOPNEUMATIC DEVICE THERAPY: CPT | Performed by: PHYSICAL THERAPIST

## 2023-01-26 PROCEDURE — 97140 MANUAL THERAPY 1/> REGIONS: CPT | Performed by: PHYSICAL THERAPIST

## 2023-01-26 PROCEDURE — 97110 THERAPEUTIC EXERCISES: CPT | Performed by: PHYSICAL THERAPIST

## 2023-01-27 ENCOUNTER — OFFICE VISIT (OUTPATIENT)
Dept: PHYSICAL THERAPY | Age: 58
End: 2023-01-27
Attending: FAMILY MEDICINE
Payer: COMMERCIAL

## 2023-01-27 ENCOUNTER — TELEPHONE (OUTPATIENT)
Dept: FAMILY MEDICINE CLINIC | Facility: CLINIC | Age: 58
End: 2023-01-27

## 2023-01-27 DIAGNOSIS — I89.0 LYMPHEDEMA OF BOTH LOWER EXTREMITIES: ICD-10-CM

## 2023-01-27 DIAGNOSIS — K75.81 NASH (NONALCOHOLIC STEATOHEPATITIS): Primary | ICD-10-CM

## 2023-01-27 PROCEDURE — 97140 MANUAL THERAPY 1/> REGIONS: CPT | Performed by: PHYSICAL THERAPIST

## 2023-01-27 PROCEDURE — 97016 VASOPNEUMATIC DEVICE THERAPY: CPT | Performed by: PHYSICAL THERAPIST

## 2023-01-27 PROCEDURE — 97110 THERAPEUTIC EXERCISES: CPT | Performed by: PHYSICAL THERAPIST

## 2023-01-27 NOTE — TELEPHONE ENCOUNTER
Therapy entered new referral this AM- it has been submitted to Juan [address] to Philippe online     [id number]

## 2023-01-30 ENCOUNTER — OFFICE VISIT (OUTPATIENT)
Dept: PHYSICAL THERAPY | Age: 58
End: 2023-01-30
Attending: FAMILY MEDICINE
Payer: COMMERCIAL

## 2023-01-30 PROCEDURE — 97016 VASOPNEUMATIC DEVICE THERAPY: CPT | Performed by: PHYSICAL THERAPIST

## 2023-01-30 PROCEDURE — 97110 THERAPEUTIC EXERCISES: CPT | Performed by: PHYSICAL THERAPIST

## 2023-01-30 PROCEDURE — 97140 MANUAL THERAPY 1/> REGIONS: CPT | Performed by: PHYSICAL THERAPIST

## 2023-02-01 ENCOUNTER — OFFICE VISIT (OUTPATIENT)
Dept: INTERNAL MEDICINE CLINIC | Facility: CLINIC | Age: 58
End: 2023-02-01
Payer: COMMERCIAL

## 2023-02-01 VITALS
BODY MASS INDEX: 41.12 KG/M2 | OXYGEN SATURATION: 99 % | RESPIRATION RATE: 16 BRPM | DIASTOLIC BLOOD PRESSURE: 96 MMHG | HEART RATE: 92 BPM | HEIGHT: 63.5 IN | WEIGHT: 235 LBS | SYSTOLIC BLOOD PRESSURE: 144 MMHG

## 2023-02-01 DIAGNOSIS — K75.81 NASH (NONALCOHOLIC STEATOHEPATITIS): ICD-10-CM

## 2023-02-01 DIAGNOSIS — R73.03 PREDIABETES: ICD-10-CM

## 2023-02-01 DIAGNOSIS — K21.00 GASTROESOPHAGEAL REFLUX DISEASE WITH ESOPHAGITIS WITHOUT HEMORRHAGE: ICD-10-CM

## 2023-02-01 DIAGNOSIS — E78.5 HYPERLIPIDEMIA, UNSPECIFIED HYPERLIPIDEMIA TYPE: ICD-10-CM

## 2023-02-01 DIAGNOSIS — E66.01 CLASS 3 SEVERE OBESITY WITH SERIOUS COMORBIDITY AND BODY MASS INDEX (BMI) OF 40.0 TO 44.9 IN ADULT, UNSPECIFIED OBESITY TYPE (HCC): ICD-10-CM

## 2023-02-01 DIAGNOSIS — I10 PRIMARY HYPERTENSION: ICD-10-CM

## 2023-02-01 DIAGNOSIS — Z51.81 ENCOUNTER FOR THERAPEUTIC DRUG MONITORING: Primary | ICD-10-CM

## 2023-02-01 PROBLEM — E66.813 CLASS 3 SEVERE OBESITY WITH SERIOUS COMORBIDITY AND BODY MASS INDEX (BMI) OF 40.0 TO 44.9 IN ADULT (HCC): Status: ACTIVE | Noted: 2023-02-01

## 2023-02-01 PROBLEM — E66.813 CLASS 3 SEVERE OBESITY WITH SERIOUS COMORBIDITY AND BODY MASS INDEX (BMI) OF 40.0 TO 44.9 IN ADULT: Status: ACTIVE | Noted: 2023-02-01

## 2023-02-02 ENCOUNTER — OFFICE VISIT (OUTPATIENT)
Dept: PHYSICAL THERAPY | Age: 58
End: 2023-02-02
Attending: FAMILY MEDICINE
Payer: COMMERCIAL

## 2023-02-02 PROCEDURE — 97110 THERAPEUTIC EXERCISES: CPT | Performed by: PHYSICAL THERAPIST

## 2023-02-02 PROCEDURE — 97140 MANUAL THERAPY 1/> REGIONS: CPT | Performed by: PHYSICAL THERAPIST

## 2023-02-02 PROCEDURE — 97016 VASOPNEUMATIC DEVICE THERAPY: CPT | Performed by: PHYSICAL THERAPIST

## 2023-02-02 NOTE — PATIENT INSTRUCTIONS
Welcome to the [facility]...your Lifestyle Renovation begins now! Thank you for placing your trust in our health care team, I look forward to working with you along this journey to better health! Next steps:     1. Call our office at [phone] to schedule a personal nutrition consultation with one of our registered dieticians. Bring along your food journal (3 days minimum). See journal options below. 2.  Complete non fasting labs at [facility] lab site prior to next office visit. 3.  Fill your prescribed medication and take as discussed and prescribed: None at this time. Consider Topamax for cravings or Wegovy (www.wegovy. com). Please try to work on the following dietary changes this first month:    1. Drink water with meals and throughout the day, cut down on soda and/or juice if consumed. Consider flavored water options like Bubbly, Spindrift, Hint and Wen. 2.  Eat breakfast daily and focus on having protein with each meal, examples include: greek yogurt, cottage cheese, hard boiled egg, whole grain toast with peanut butter. Daily protein recommendation to start: 90 grams. 3.  Reduce refined carbohydrates and sugars which includes items such as sweets, as well as rice, pasta, and bread and make sure to choose whole grain options when having them with just 1 serving per meal about the size of your inner palm. Daily carbohydrate recommendation to start: 150 grams. 4.  Consume non starchy veggies daily working towards making them a good 50% of your daily food intake. Add them to lunch and dinner consistently. 5.  Start a daily probiotic: VSL#3 is recommended, (order on line at www.vsl3. com). Take 1 capsule daily with water for 30 days, then reduce to 1 every other day (this will reduce the cost). Capsules can be left out for 2 weeks, but then must be refrigerated. Please download kevin My Fitness Cintia Zambrano!  Or Net Diary to monitor daily dietary intake and you will be able to see if you are eating the right amount of calories or too much or too little which would hinder weight loss. Additionally this will help to see your daily carbohydrate and protein intake. When you set the cholo up choose 1-2 lbs/week as a goal.  Keeping a paper food journal is an option as well to remain accountable for your choices- this is the start to mindful eating! A low calorie diet has been consistently shown to support weight loss. If you are using paper to log your nutrition I recommend your daily calorie intake to start: 1400. Continue or start exercising to help establish a routine. If not already exercising begin with 1 day and progress as able with long-term goal of 30 minutes 5 days a week at a minimum. Meditation daily can help manage and control stress. Chronic stress can make weight loss difficult. Exercising is one way to help with stress, but meditation using the CALM Cholo or another comparable alternative can be done in your home or place of work with little time commitment. This Cholo can also help work on behavior change and improve sleep. Check out the segment under Calm Masterclass and listen to The 4 Pillars of Health. A great way to begin learning about the foundation of lifestyle with practical tips to use in your every day. Check out www.yourweightmatters. org blog for continued daily support and education along this weight loss journey! Patient Resources:    Personal Training/Fitness Classes/Health Coaching    Violet Howard and Pierce Sophiaside @ [email or web address]/ Full fitness center with group fitness and personal training. Discount available as client of [facility].   Health Coaching and Personal Training with Cash Platt at our [facility]- individual weekly coaching with option to add personal training and small group fitness classes targeted at weight loss- [phone] and/or email @ [email or web address]  360FIT Wallace @ [email or web address] Group Fitness [phone] and/or email Barrytonryland Davis at [email or web address]  [address] POPVOX with multiple locations: Aetna (www.Xylan Corporation), Eat The Parudi Fitness (www.MacroCure. Maritime Broadband), Fit Body Bootcamp (www.SolarCity New Zealand LimitedbodybootOpenTablep"Woodenshark, LLC"), PedidosYa / PedidosJÃ¡ Fitness (www.JML Optical Industries), The Exercise  (www.exercisecoach.Maritime Broadband)    Online Fitness  Fitness  on [employer] in 10 DVD series   www. kivti46TJJCegal  Sit and Be Fit - Chair exercise series Www.sitandbefit. org  Hip Hop Fit with Abhay Weinstein at www.hiphopfit. net    Apps for on the TechShop 7 Minute Workout (orange box with white 7) - free on the go HIIT training cholo  Peloton Cholo @ Quantified Communications    Nutrition Trackers and Tools  LoseIT! And My Fitness Pal apps and on line for tracking nutrition  NOOM - virtual health coaching  FitFoundation (healthy meals on the go) in [facility] @ haku sdbhbwjceohve3x. Perotis Burt MD @ wwwViking Cold Solutions and Agnes Gil (keto and low carb plans recommended) @ www. QCZJOY08.RICE, Metabolic Meals @ www. WGT MediaMetabolicMeals. com - individual prepared meals to go  Almondy, Peap.co, International Business Machines, Every Plate, Cartela AB- on line meal delivery programs for preparation at home  [employer] in [place] for homemade meals to go @ www.mealLoop App. Maritime Broadband  Diet Doctor @ www. dietdoctor. com - low carb swaps  YummRed Hot Labs - meal prep and planning cholo (www.yummly. com)    Stress Management/Behavior/Mindful Eating  CALM meditation cholo (www.calm. Maritime Broadband)  Headspace  Am I Hungry? Mindful eating virtual  cholo  Www.yourweightmatters. org - Obesity Action Coalition sponsored Blog posts daily  Motivation cholo (black box with white \")- daily supportive messages sent to your phone    Books/Video Education/Podcasts  Mindless Eating by Maria Elena Singh  Why We Get Sick by Ted Franks (a book about insulin resistance)  Atomic Habits by Gage Ortiz (a book about taking small steps to promote greater behavior change)   Can't Hurt Me by Reji Mccoy (a book exploring the power of discipline in achieving your goals)  The End of Dieting: How to Live for Life by Dr. Bruna Jackson M.D. or listen to The [facility] Episode 61: Understanding \"Nutritarian\" Eating w/Dr. Bruna Jackson  Your Body in Balance: The Vanilla Forums of Food, Hormones, and Health by Dr. Katia Benedict  The Menopause Diet Plan by Nehemias [facility] and [facility]  The Complete Guide to fasting by Dr. Freddy Campa, [facility] by Gracie Kruse, Ph.D, R.D. Weight Loss Surgery Will Not Treat Food Addiction by Olga Lidia Sandoval Ph.D  The [address] [place] [address] on plant based nutrition  Fed Up - documentary about obesity (Free on [facility]Mei[facility])  The Truth About Sugar - documentary on sugar (Free on Linksy, https://youtu. be/8L0yttsYM6x)  The Dr. Sadie العلي by Dr. Sonal Holliday MD  Fitlosophy Fitspiration - journal to better health (found at [employer] in fitness aisle)  What Happened to You?- a look at the impact trauma has on behavior written by Mariia Dinero and Dr. Emily Fuller Again by Roby Cifuentes - discovering your true self after trauma  Jan Manzano talk on MyWedding, The Call to Courage  Podcasts:  The Exam Room by the Physician's Committee, Nutrition Facts by Dr. Quentin Preciado

## 2023-02-03 ENCOUNTER — OFFICE VISIT (OUTPATIENT)
Dept: PHYSICAL THERAPY | Age: 58
End: 2023-02-03
Attending: FAMILY MEDICINE
Payer: COMMERCIAL

## 2023-02-03 PROCEDURE — 97016 VASOPNEUMATIC DEVICE THERAPY: CPT | Performed by: PHYSICAL THERAPIST

## 2023-02-03 PROCEDURE — 97140 MANUAL THERAPY 1/> REGIONS: CPT | Performed by: PHYSICAL THERAPIST

## 2023-02-03 PROCEDURE — 97110 THERAPEUTIC EXERCISES: CPT | Performed by: PHYSICAL THERAPIST

## 2023-02-07 ENCOUNTER — OFFICE VISIT (OUTPATIENT)
Dept: PHYSICAL THERAPY | Age: 58
End: 2023-02-07
Attending: FAMILY MEDICINE
Payer: COMMERCIAL

## 2023-02-07 PROCEDURE — 97016 VASOPNEUMATIC DEVICE THERAPY: CPT | Performed by: PHYSICAL THERAPIST

## 2023-02-07 PROCEDURE — 97140 MANUAL THERAPY 1/> REGIONS: CPT | Performed by: PHYSICAL THERAPIST

## 2023-02-07 PROCEDURE — 97110 THERAPEUTIC EXERCISES: CPT | Performed by: PHYSICAL THERAPIST

## 2023-02-09 ENCOUNTER — APPOINTMENT (OUTPATIENT)
Dept: PHYSICAL THERAPY | Age: 58
End: 2023-02-09
Attending: FAMILY MEDICINE
Payer: COMMERCIAL

## 2023-02-09 ENCOUNTER — TELEPHONE (OUTPATIENT)
Dept: PHYSICAL THERAPY | Facility: HOSPITAL | Age: 58
End: 2023-02-09

## 2023-02-10 ENCOUNTER — APPOINTMENT (OUTPATIENT)
Dept: PHYSICAL THERAPY | Age: 58
End: 2023-02-10
Attending: FAMILY MEDICINE
Payer: COMMERCIAL

## 2023-02-14 ENCOUNTER — OFFICE VISIT (OUTPATIENT)
Dept: PHYSICAL THERAPY | Age: 58
End: 2023-02-14
Attending: FAMILY MEDICINE
Payer: COMMERCIAL

## 2023-02-14 PROCEDURE — 97016 VASOPNEUMATIC DEVICE THERAPY: CPT | Performed by: PHYSICAL THERAPIST

## 2023-02-14 PROCEDURE — 97110 THERAPEUTIC EXERCISES: CPT | Performed by: PHYSICAL THERAPIST

## 2023-02-14 PROCEDURE — 97140 MANUAL THERAPY 1/> REGIONS: CPT | Performed by: PHYSICAL THERAPIST

## 2023-02-16 ENCOUNTER — OFFICE VISIT (OUTPATIENT)
Dept: PHYSICAL THERAPY | Age: 58
End: 2023-02-16
Attending: FAMILY MEDICINE
Payer: COMMERCIAL

## 2023-02-16 PROCEDURE — 97016 VASOPNEUMATIC DEVICE THERAPY: CPT | Performed by: PHYSICAL THERAPIST

## 2023-02-16 PROCEDURE — 97140 MANUAL THERAPY 1/> REGIONS: CPT | Performed by: PHYSICAL THERAPIST

## 2023-02-16 PROCEDURE — 97110 THERAPEUTIC EXERCISES: CPT | Performed by: PHYSICAL THERAPIST

## 2023-02-17 ENCOUNTER — OFFICE VISIT (OUTPATIENT)
Dept: PHYSICAL THERAPY | Age: 58
End: 2023-02-17
Attending: FAMILY MEDICINE
Payer: COMMERCIAL

## 2023-02-17 PROCEDURE — 97140 MANUAL THERAPY 1/> REGIONS: CPT | Performed by: PHYSICAL THERAPIST

## 2023-02-17 PROCEDURE — 97110 THERAPEUTIC EXERCISES: CPT | Performed by: PHYSICAL THERAPIST

## 2023-02-17 PROCEDURE — 97016 VASOPNEUMATIC DEVICE THERAPY: CPT | Performed by: PHYSICAL THERAPIST

## 2023-02-21 ENCOUNTER — OFFICE VISIT (OUTPATIENT)
Dept: PHYSICAL THERAPY | Age: 58
End: 2023-02-21
Attending: FAMILY MEDICINE
Payer: COMMERCIAL

## 2023-02-21 PROCEDURE — 97110 THERAPEUTIC EXERCISES: CPT | Performed by: PHYSICAL THERAPIST

## 2023-02-21 PROCEDURE — 97140 MANUAL THERAPY 1/> REGIONS: CPT | Performed by: PHYSICAL THERAPIST

## 2023-02-21 PROCEDURE — 97016 VASOPNEUMATIC DEVICE THERAPY: CPT | Performed by: PHYSICAL THERAPIST

## 2023-02-23 ENCOUNTER — APPOINTMENT (OUTPATIENT)
Dept: PHYSICAL THERAPY | Age: 58
End: 2023-02-23
Attending: FAMILY MEDICINE
Payer: COMMERCIAL

## 2023-02-24 ENCOUNTER — OFFICE VISIT (OUTPATIENT)
Dept: PHYSICAL THERAPY | Age: 58
End: 2023-02-24
Attending: FAMILY MEDICINE
Payer: COMMERCIAL

## 2023-02-24 PROCEDURE — 97140 MANUAL THERAPY 1/> REGIONS: CPT | Performed by: PHYSICAL THERAPIST

## 2023-02-24 PROCEDURE — 97110 THERAPEUTIC EXERCISES: CPT | Performed by: PHYSICAL THERAPIST

## 2023-03-24 ENCOUNTER — APPOINTMENT (OUTPATIENT)
Dept: PHYSICAL THERAPY | Age: 58
End: 2023-03-24
Attending: FAMILY MEDICINE
Payer: COMMERCIAL

## 2023-03-29 RX ORDER — HYDROCHLOROTHIAZIDE 25 MG/1
25 TABLET ORAL DAILY
Qty: 90 TABLET | Refills: 0 | Status: SHIPPED | OUTPATIENT
Start: 2023-03-29

## 2023-03-29 RX ORDER — METOPROLOL SUCCINATE 100 MG/1
TABLET, EXTENDED RELEASE ORAL
Qty: 180 TABLET | Refills: 0 | Status: SHIPPED | OUTPATIENT
Start: 2023-03-29

## 2023-03-31 ENCOUNTER — OFFICE VISIT (OUTPATIENT)
Dept: PHYSICAL THERAPY | Age: 58
End: 2023-03-31
Attending: FAMILY MEDICINE
Payer: COMMERCIAL

## 2023-03-31 PROCEDURE — 97140 MANUAL THERAPY 1/> REGIONS: CPT | Performed by: PHYSICAL THERAPIST

## 2023-04-24 ENCOUNTER — PATIENT MESSAGE (OUTPATIENT)
Dept: FAMILY MEDICINE CLINIC | Facility: CLINIC | Age: 58
End: 2023-04-24

## 2023-04-24 DIAGNOSIS — H05.263: ICD-10-CM

## 2023-04-24 DIAGNOSIS — H53.9 VISUAL DISTURBANCE: Primary | ICD-10-CM

## 2023-04-24 NOTE — TELEPHONE ENCOUNTER
From: Kush Martinez  To: Shoshana Hooker DO  Sent: 4/24/2023 9:05 AM CDT  Subject: Referral for Lisa Santana     I have one final follow up with Dr. Nataliya Artis on 5/9 and will need a referral for this visit.

## 2023-05-02 NOTE — PROGRESS NOTES
Detail Level: Detailed LOWER EXTREMITY EVALUATION:   Referring Physician: Dr. Joseph Dougherty   Diagnosis: Right Knee Osteoarthritis     Date of Service: 6/21/2021     PATIENT Glenys Thakkar is a 61year old y/o female who presents to therapy today with complaints of bilateral Add 84845 Cpt? (Important Note: In 2017 The Use Of 37372 Is Being Tracked By Cms To Determine Future Global Period Reimbursement For Global Periods): yes step and stride length of the LE. Palpation: Increased pain with palpation at the distal quadriceps. AROM/PROM: Hips are limited for IR and FLXN with anterior capsular impingement endfeel anteriorly. Knee ROM is full.       Accessory motion: The alex negotiation. Frequency / Duration: Patient will be seen for 2 x/week or a total of 12 visits over a 90 day period. Treatment will include: Manual Therapy; Therapeutic Exercises; Neuromuscular Re-education;  Therapeutic Activity; Gait Training; Electric

## 2023-05-09 ENCOUNTER — PATIENT MESSAGE (OUTPATIENT)
Dept: FAMILY MEDICINE CLINIC | Facility: CLINIC | Age: 58
End: 2023-05-09

## 2023-05-09 DIAGNOSIS — H57.9 EYE PROBLEM: ICD-10-CM

## 2023-05-09 DIAGNOSIS — R51.9 RETROBULBAR HEADACHE: Primary | ICD-10-CM

## 2023-05-09 NOTE — TELEPHONE ENCOUNTER
From: Kiet Hodges  To: Terry Goins DO  Sent: 5/9/2023 12:18 PM CDT  Subject: Referral for CT Scan    Dr Radha Herzog would like me to get a CT Scan, I've attached her order). She believes I need to get this authorized by you, my PCP.    Thanks, [employer]

## 2023-05-15 ENCOUNTER — OFFICE VISIT (OUTPATIENT)
Dept: FAMILY MEDICINE CLINIC | Facility: CLINIC | Age: 58
End: 2023-05-15
Payer: COMMERCIAL

## 2023-05-15 VITALS
TEMPERATURE: 97 F | HEART RATE: 94 BPM | DIASTOLIC BLOOD PRESSURE: 76 MMHG | OXYGEN SATURATION: 98 % | RESPIRATION RATE: 18 BRPM | WEIGHT: 234.5 LBS | SYSTOLIC BLOOD PRESSURE: 132 MMHG | BODY MASS INDEX: 41 KG/M2

## 2023-05-15 DIAGNOSIS — I10 PRIMARY HYPERTENSION: ICD-10-CM

## 2023-05-15 DIAGNOSIS — E78.5 HYPERLIPIDEMIA, UNSPECIFIED HYPERLIPIDEMIA TYPE: ICD-10-CM

## 2023-05-15 DIAGNOSIS — Z51.81 ENCOUNTER FOR THERAPEUTIC DRUG MONITORING: ICD-10-CM

## 2023-05-15 DIAGNOSIS — M71.349 SYNOVIAL CYST OF HAND: ICD-10-CM

## 2023-05-15 DIAGNOSIS — M25.50 ARTHRALGIA OF MULTIPLE JOINTS: Primary | ICD-10-CM

## 2023-05-15 DIAGNOSIS — K21.00 GASTROESOPHAGEAL REFLUX DISEASE WITH ESOPHAGITIS WITHOUT HEMORRHAGE: ICD-10-CM

## 2023-05-15 DIAGNOSIS — E66.01 CLASS 3 SEVERE OBESITY WITH SERIOUS COMORBIDITY AND BODY MASS INDEX (BMI) OF 40.0 TO 44.9 IN ADULT, UNSPECIFIED OBESITY TYPE (HCC): ICD-10-CM

## 2023-05-15 DIAGNOSIS — R73.03 PREDIABETES: ICD-10-CM

## 2023-05-15 DIAGNOSIS — K75.81 NASH (NONALCOHOLIC STEATOHEPATITIS): ICD-10-CM

## 2023-05-15 DIAGNOSIS — E78.2 MIXED HYPERLIPIDEMIA: ICD-10-CM

## 2023-05-15 PROBLEM — I49.3 PVC'S (PREMATURE VENTRICULAR CONTRACTIONS): Status: ACTIVE | Noted: 2022-11-21

## 2023-05-15 PROBLEM — I49.1 PREMATURE ATRIAL CONTRACTION: Status: ACTIVE | Noted: 2022-11-21

## 2023-05-15 LAB
CHOLEST SERPL-MCNC: 180 MG/DL (ref ?–200)
CRP SERPL HS-MCNC: 8.05 MG/L (ref ?–3)
FASTING PATIENT LIPID ANSWER: YES
HDLC SERPL-MCNC: 45 MG/DL (ref 40–59)
LDLC SERPL CALC-MCNC: 119 MG/DL (ref ?–100)
NONHDLC SERPL-MCNC: 135 MG/DL (ref ?–130)
RHEUMATOID FACT SERPL-ACNC: <10 IU/ML (ref ?–15)
TRIGL SERPL-MCNC: 84 MG/DL (ref 30–149)
VIT B12 SERPL-MCNC: 435 PG/ML (ref 193–986)
VIT D+METAB SERPL-MCNC: 26.9 NG/ML (ref 30–100)
VLDLC SERPL CALC-MCNC: 15 MG/DL (ref 0–30)

## 2023-05-15 PROCEDURE — 86141 C-REACTIVE PROTEIN HS: CPT | Performed by: FAMILY MEDICINE

## 2023-05-15 PROCEDURE — 80061 LIPID PANEL: CPT | Performed by: FAMILY MEDICINE

## 2023-05-15 PROCEDURE — 86200 CCP ANTIBODY: CPT | Performed by: FAMILY MEDICINE

## 2023-05-15 PROCEDURE — 86038 ANTINUCLEAR ANTIBODIES: CPT | Performed by: FAMILY MEDICINE

## 2023-05-15 PROCEDURE — 82306 VITAMIN D 25 HYDROXY: CPT | Performed by: FAMILY MEDICINE

## 2023-05-15 PROCEDURE — 86225 DNA ANTIBODY NATIVE: CPT | Performed by: FAMILY MEDICINE

## 2023-05-15 PROCEDURE — 82607 VITAMIN B-12: CPT | Performed by: FAMILY MEDICINE

## 2023-05-15 PROCEDURE — 99214 OFFICE O/P EST MOD 30 MIN: CPT | Performed by: FAMILY MEDICINE

## 2023-05-15 PROCEDURE — 3078F DIAST BP <80 MM HG: CPT | Performed by: FAMILY MEDICINE

## 2023-05-15 PROCEDURE — 3075F SYST BP GE 130 - 139MM HG: CPT | Performed by: FAMILY MEDICINE

## 2023-05-15 PROCEDURE — 86431 RHEUMATOID FACTOR QUANT: CPT | Performed by: FAMILY MEDICINE

## 2023-05-15 RX ORDER — FLUTICASONE PROPIONATE AND SALMETEROL 250; 50 UG/1; UG/1
POWDER RESPIRATORY (INHALATION)
COMMUNITY
Start: 2022-11-21

## 2023-05-16 ENCOUNTER — TELEPHONE (OUTPATIENT)
Dept: FAMILY MEDICINE CLINIC | Facility: CLINIC | Age: 58
End: 2023-05-16

## 2023-05-16 LAB
CCP IGG SERPL-ACNC: 1.9 U/ML (ref 0–6.9)
DSDNA IGG SERPL IA-ACNC: 0.9 IU/ML
ENA AB SER QL IA: 0.1 UG/L
ENA AB SER QL IA: NEGATIVE

## 2023-05-16 NOTE — TELEPHONE ENCOUNTER
----- Message from Mary Baig, DO sent at 5/16/2023  1:36 PM CDT -----  Please notify Bry Greco, her cholesterol looks ok,  is she still taking the atorvastatin?   The labs show she does not have Rheumatoid arthritis, or lupus, I think her joint issues are probably due to osteoarthritis

## 2023-05-16 NOTE — TELEPHONE ENCOUNTER
Spoke with patient, aware of results and recommendations. Patient states she does not take atorvastatin regularly. Per DS have patient take medication regularly.     Patient v/u

## 2023-05-17 RX ORDER — ERGOCALCIFEROL 1.25 MG/1
50000 CAPSULE ORAL WEEKLY
Qty: 12 CAPSULE | Refills: 1 | Status: SHIPPED | OUTPATIENT
Start: 2023-05-17

## 2023-05-18 ENCOUNTER — TELEPHONE (OUTPATIENT)
Dept: FAMILY MEDICINE CLINIC | Facility: CLINIC | Age: 58
End: 2023-05-18

## 2023-05-18 NOTE — TELEPHONE ENCOUNTER
Sudha Mixon from [facility] radiology called asking for clarification on pt CT order. Please call [phone], thanks!

## 2023-05-18 NOTE — TELEPHONE ENCOUNTER
RN Spoke with Freddie Temple in [address]- she questioned the CT orders of the Brain.  She states it should be fine as With Contrast.    Per verbal by DS okay to do with contrast only for both    Pollo verbalized understanding

## 2023-05-19 ENCOUNTER — HOSPITAL ENCOUNTER (OUTPATIENT)
Dept: CT IMAGING | Age: 58
Discharge: HOME | End: 2023-05-19
Attending: FAMILY MEDICINE
Payer: COMMERCIAL

## 2023-05-19 DIAGNOSIS — R51.9 RETROBULBAR HEADACHE: ICD-10-CM

## 2023-05-19 DIAGNOSIS — H57.9 EYE PROBLEM: ICD-10-CM

## 2023-05-19 PROCEDURE — 70460 CT HEAD/BRAIN W/DYE: CPT | Performed by: FAMILY MEDICINE

## 2023-05-19 PROCEDURE — 70481 CT ORBIT/EAR/FOSSA W/DYE: CPT | Performed by: FAMILY MEDICINE

## 2023-05-19 PROCEDURE — 82565 ASSAY OF CREATININE: CPT

## 2023-05-20 LAB
CREAT BLD-MCNC: 1.1 MG/DL
GFR SERPLBLD BASED ON 1.73 SQ M-ARVRAT: 58 ML/MIN/1.73M2 (ref 60–?)

## 2023-05-23 ENCOUNTER — TELEPHONE (OUTPATIENT)
Dept: FAMILY MEDICINE CLINIC | Facility: CLINIC | Age: 58
End: 2023-05-23

## 2023-05-23 NOTE — TELEPHONE ENCOUNTER
----- Message from Santy Sung DO sent at 5/23/2023  2:33 PM CDT -----  Notified of results and she needs a CTA, and CTV of the opthalmic arteries because of possibly smaller than normal veins communicating with subcutaneous  blood vessels.  Will place the order and then she can schedule

## 2023-05-24 DIAGNOSIS — Q28.3 VASCULAR ABNORMALITY OF BRAIN: ICD-10-CM

## 2023-05-24 DIAGNOSIS — R51.9 RETROBULBAR HEADACHE: Primary | ICD-10-CM

## 2023-05-24 DIAGNOSIS — H05.263: ICD-10-CM

## 2023-05-24 NOTE — TELEPHONE ENCOUNTER
PT CALLED AND ADV WENT TO SCHEDULED TESTS AND STILL NO ORDER. CAN DR PLEASE PLACE ORDERS?     Zeus Gama

## 2023-05-30 ENCOUNTER — PATIENT MESSAGE (OUTPATIENT)
Dept: FAMILY MEDICINE CLINIC | Facility: CLINIC | Age: 58
End: 2023-05-30

## 2023-05-30 NOTE — TELEPHONE ENCOUNTER
From: Denver Ruiz  To: Jackelyn Kendrick DO  Sent: 5/30/2023 10:50 AM CDT  Subject: Order for additional CT Scan    Good morning, I was checking to see if the order had been submitted for the additional CT Scan so I can get the test scheduled.    Thanks, [employer]

## 2023-06-06 ENCOUNTER — HOSPITAL ENCOUNTER (OUTPATIENT)
Dept: CT IMAGING | Age: 58
Discharge: HOME | End: 2023-06-06
Attending: FAMILY MEDICINE
Payer: COMMERCIAL

## 2023-06-06 DIAGNOSIS — Q28.3 VASCULAR ABNORMALITY OF BRAIN: ICD-10-CM

## 2023-06-06 DIAGNOSIS — R51.9 RETROBULBAR HEADACHE: ICD-10-CM

## 2023-06-06 DIAGNOSIS — H05.263: ICD-10-CM

## 2023-06-06 PROCEDURE — 82565 ASSAY OF CREATININE: CPT

## 2023-06-06 PROCEDURE — 70496 CT ANGIOGRAPHY HEAD: CPT | Performed by: FAMILY MEDICINE

## 2023-06-07 LAB
CREAT BLD-MCNC: 1.1 MG/DL
GFR SERPLBLD BASED ON 1.73 SQ M-ARVRAT: 58 ML/MIN/1.73M2 (ref 60–?)

## 2023-06-08 ENCOUNTER — PATIENT MESSAGE (OUTPATIENT)
Dept: FAMILY MEDICINE CLINIC | Facility: CLINIC | Age: 58
End: 2023-06-08

## 2023-06-08 NOTE — TELEPHONE ENCOUNTER
From: Trever Ivy  To: Amie Paulino DO  Sent: 6/8/2023 1:16 PM CDT  Subject: Results of CTA and CTV    I was looking to see if you had a chance to review the results of the CT from Monday.

## 2023-07-27 RX ORDER — ATORVASTATIN CALCIUM 10 MG/1
10 TABLET, FILM COATED ORAL DAILY
Qty: 90 TABLET | Refills: 1 | Status: SHIPPED | OUTPATIENT
Start: 2023-07-27

## 2023-07-27 RX ORDER — HYDROCHLOROTHIAZIDE 25 MG/1
25 TABLET ORAL DAILY
Qty: 90 TABLET | Refills: 2 | Status: SHIPPED | OUTPATIENT
Start: 2023-07-27

## 2023-07-27 RX ORDER — METOPROLOL SUCCINATE 100 MG/1
TABLET, EXTENDED RELEASE ORAL
Qty: 180 TABLET | Refills: 2 | Status: SHIPPED | OUTPATIENT
Start: 2023-07-27

## 2023-07-27 NOTE — TELEPHONE ENCOUNTER
Cholesterol Medication Protocol [id number]/26/2023 10:20 PM   Protocol Details ALT < 80    ALT resulted within past year    Lipid panel within past 12 months    Appointment within past 12 or next 3 months     LOV 5/15/23    No future appointments.     MEDICATION PAST PROTOCOL    MEDICATION SENT

## 2023-08-02 ENCOUNTER — MED REC SCAN ONLY (OUTPATIENT)
Dept: FAMILY MEDICINE CLINIC | Facility: CLINIC | Age: 58
End: 2023-08-02

## 2023-08-09 ENCOUNTER — TELEPHONE (OUTPATIENT)
Dept: FAMILY MEDICINE CLINIC | Facility: CLINIC | Age: 58
End: 2023-08-09

## 2023-08-09 DIAGNOSIS — I89.0 LYMPHEDEMA OF BOTH LOWER EXTREMITIES: Primary | ICD-10-CM

## 2023-08-09 NOTE — TELEPHONE ENCOUNTER
Fax received fro [facility] for compression Day time knee garment   4 units     NPI# [id number]    REferral entered and note sent to [place] at [facility]

## 2023-09-12 ENCOUNTER — PATIENT MESSAGE (OUTPATIENT)
Dept: FAMILY MEDICINE CLINIC | Facility: CLINIC | Age: 58
End: 2023-09-12

## 2023-11-07 ENCOUNTER — HOSPITAL ENCOUNTER (EMERGENCY)
Facility: HOSPITAL | Age: 58
Discharge: HOME | End: 2023-11-08
Attending: EMERGENCY MEDICINE | Admitting: EMERGENCY MEDICINE
Payer: COMMERCIAL

## 2023-11-07 DIAGNOSIS — E04.1 THYROID NODULE: ICD-10-CM

## 2023-11-07 DIAGNOSIS — R06.00 DYSPNEA, UNSPECIFIED TYPE: ICD-10-CM

## 2023-11-07 DIAGNOSIS — B34.9 VIRAL SYNDROME: Primary | ICD-10-CM

## 2023-11-08 ENCOUNTER — APPOINTMENT (OUTPATIENT)
Dept: GENERAL RADIOLOGY | Age: 58
End: 2023-11-08
Attending: EMERGENCY MEDICINE
Payer: COMMERCIAL

## 2023-11-08 ENCOUNTER — APPOINTMENT (OUTPATIENT)
Dept: CT IMAGING | Age: 58
End: 2023-11-08
Attending: EMERGENCY MEDICINE
Payer: COMMERCIAL

## 2023-11-08 ENCOUNTER — APPOINTMENT (OUTPATIENT)
Dept: CT IMAGING | Facility: HOSPITAL | Age: 58
End: 2023-11-08
Attending: EMERGENCY MEDICINE
Payer: COMMERCIAL

## 2023-11-08 VITALS
WEIGHT: 230 LBS | HEIGHT: 64 IN | BODY MASS INDEX: 39.27 KG/M2 | OXYGEN SATURATION: 93 % | DIASTOLIC BLOOD PRESSURE: 67 MMHG | SYSTOLIC BLOOD PRESSURE: 137 MMHG | RESPIRATION RATE: 18 BRPM | TEMPERATURE: 100 F | HEART RATE: 93 BPM

## 2023-11-08 LAB
ALBUMIN SERPL-MCNC: 3.4 G/DL (ref 3.4–5)
ALBUMIN/GLOB SERPL: 0.8 {RATIO} (ref 1–2)
ALP LIVER SERPL-CCNC: 157 U/L
ALT SERPL-CCNC: 48 U/L
ANION GAP SERPL CALC-SCNC: 3 MMOL/L (ref 0–18)
AST SERPL-CCNC: 32 U/L (ref 15–37)
ATRIAL RATE: 136 BPM
BASOPHILS # BLD AUTO: 0.03 X10(3) UL (ref 0–0.2)
BASOPHILS NFR BLD AUTO: 0.4 %
BILIRUB SERPL-MCNC: 0.1 MG/DL (ref 0.1–2)
BUN BLD-MCNC: 13 MG/DL (ref 9–23)
CALCIUM BLD-MCNC: 9.1 MG/DL (ref 8.5–10.1)
CHLORIDE SERPL-SCNC: 107 MMOL/L (ref 98–112)
CO2 SERPL-SCNC: 28 MMOL/L (ref 21–32)
CREAT BLD-MCNC: 1.23 MG/DL
D DIMER PPP FEU-MCNC: 0.77 UG/ML FEU (ref ?–0.59)
EGFRCR SERPLBLD CKD-EPI 2021: 51 ML/MIN/1.73M2 (ref 60–?)
EOSINOPHIL # BLD AUTO: 0.08 X10(3) UL (ref 0–0.7)
EOSINOPHIL NFR BLD AUTO: 1.1 %
ERYTHROCYTE [DISTWIDTH] IN BLOOD BY AUTOMATED COUNT: 14.5 %
GLOBULIN PLAS-MCNC: 4.2 G/DL (ref 2.8–4.4)
GLUCOSE BLD-MCNC: 173 MG/DL (ref 70–99)
HCT VFR BLD AUTO: 34.1 %
HGB BLD-MCNC: 11 G/DL
IMM GRANULOCYTES # BLD AUTO: 0.02 X10(3) UL (ref 0–1)
IMM GRANULOCYTES NFR BLD: 0.3 %
LYMPHOCYTES # BLD AUTO: 1.12 X10(3) UL (ref 1–4)
LYMPHOCYTES NFR BLD AUTO: 15 %
MCH RBC QN AUTO: 27 PG (ref 26–34)
MCHC RBC AUTO-ENTMCNC: 32.3 G/DL (ref 31–37)
MCV RBC AUTO: 83.8 FL
MONOCYTES # BLD AUTO: 0.8 X10(3) UL (ref 0.1–1)
MONOCYTES NFR BLD AUTO: 10.7 %
NEUTROPHILS # BLD AUTO: 5.41 X10 (3) UL (ref 1.5–7.7)
NEUTROPHILS # BLD AUTO: 5.41 X10(3) UL (ref 1.5–7.7)
NEUTROPHILS NFR BLD AUTO: 72.5 %
OSMOLALITY SERPL CALC.SUM OF ELEC: 290 MOSM/KG (ref 275–295)
P-R INTERVAL: 112 MS
PLATELET # BLD AUTO: 380 10(3)UL (ref 150–450)
POCT INFLUENZA A: NEGATIVE
POCT INFLUENZA B: NEGATIVE
POTASSIUM SERPL-SCNC: 3.4 MMOL/L (ref 3.5–5.1)
PROT SERPL-MCNC: 7.6 G/DL (ref 6.4–8.2)
Q-T INTERVAL: 362 MS
Q-T INTERVAL: 402 MS
QRS DURATION: 94 MS
QRS DURATION: 96 MS
QTC CALCULATION (BEZET): 544 MS
QTC CALCULATION (BEZET): 607 MS
R AXIS: -30 DEGREES
R AXIS: -33 DEGREES
RBC # BLD AUTO: 4.07 X10(6)UL
SARS-COV-2 RNA RESP QL NAA+PROBE: NOT DETECTED
SODIUM SERPL-SCNC: 138 MMOL/L (ref 136–145)
T AXIS: 47 DEGREES
T AXIS: 48 DEGREES
TROPONIN I SERPL HS-MCNC: 22 NG/L
VENTRICULAR RATE: 136 BPM
VENTRICULAR RATE: 137 BPM
WBC # BLD AUTO: 7.5 X10(3) UL (ref 4–11)

## 2023-11-08 PROCEDURE — 80053 COMPREHEN METABOLIC PANEL: CPT | Performed by: EMERGENCY MEDICINE

## 2023-11-08 PROCEDURE — 93005 ELECTROCARDIOGRAM TRACING: CPT

## 2023-11-08 PROCEDURE — 96361 HYDRATE IV INFUSION ADD-ON: CPT

## 2023-11-08 PROCEDURE — 84484 ASSAY OF TROPONIN QUANT: CPT | Performed by: EMERGENCY MEDICINE

## 2023-11-08 PROCEDURE — 99285 EMERGENCY DEPT VISIT HI MDM: CPT

## 2023-11-08 PROCEDURE — 87502 INFLUENZA DNA AMP PROBE: CPT | Performed by: EMERGENCY MEDICINE

## 2023-11-08 PROCEDURE — 93010 ELECTROCARDIOGRAM REPORT: CPT

## 2023-11-08 PROCEDURE — 85025 COMPLETE CBC W/AUTO DIFF WBC: CPT | Performed by: EMERGENCY MEDICINE

## 2023-11-08 PROCEDURE — 96360 HYDRATION IV INFUSION INIT: CPT

## 2023-11-08 PROCEDURE — 85379 FIBRIN DEGRADATION QUANT: CPT | Performed by: EMERGENCY MEDICINE

## 2023-11-08 PROCEDURE — 71275 CT ANGIOGRAPHY CHEST: CPT | Performed by: EMERGENCY MEDICINE

## 2023-11-08 RX ORDER — ACETAMINOPHEN 500 MG
1000 TABLET ORAL ONCE
Status: COMPLETED | OUTPATIENT
Start: 2023-11-08 | End: 2023-11-08

## 2023-11-08 RX ORDER — BENZONATATE 100 MG/1
100 CAPSULE ORAL 3 TIMES DAILY PRN
Qty: 30 CAPSULE | Refills: 0 | Status: SHIPPED | OUTPATIENT
Start: 2023-11-08 | End: 2023-12-08

## 2023-11-08 RX ORDER — ALBUTEROL SULFATE 90 UG/1
2 AEROSOL, METERED RESPIRATORY (INHALATION) EVERY 4 HOURS PRN
Qty: 1 EACH | Refills: 0 | Status: SHIPPED | OUTPATIENT
Start: 2023-11-08 | End: 2023-12-08

## 2023-11-08 RX ORDER — PREDNISONE 20 MG/1
40 TABLET ORAL DAILY
Qty: 10 TABLET | Refills: 0 | Status: SHIPPED | OUTPATIENT
Start: 2023-11-08 | End: 2023-11-13

## 2023-11-08 NOTE — DISCHARGE INSTRUCTIONS
Your CT scan incidentally shows a 1 cm thyroid nodule on the right side. You should talk with your primary care doctor about getting an ultrasound for further evaluation of this. This will be done as an outpatient.

## 2023-11-08 NOTE — ED INITIAL ASSESSMENT (HPI)
Pt to the ED for evaluation of cough. SOB, headache, elevated BP and elevated HR. PT unsure if she is febrile or not.

## 2023-11-09 ENCOUNTER — PATIENT OUTREACH (OUTPATIENT)
Dept: CASE MANAGEMENT | Age: 58
End: 2023-11-09

## 2023-11-09 NOTE — PROGRESS NOTES
1st attempt ER f/up apt request  PCP -decline, pt stated she has it taken care of  Closing encounter

## 2024-03-11 RX ORDER — ATORVASTATIN CALCIUM 10 MG/1
10 TABLET, FILM COATED ORAL DAILY
Qty: 90 TABLET | Refills: 1 | Status: SHIPPED | OUTPATIENT
Start: 2024-03-11

## 2024-03-30 RX ORDER — PANTOPRAZOLE SODIUM 40 MG/1
40 TABLET, DELAYED RELEASE ORAL
Qty: 90 TABLET | Refills: 2 | Status: SHIPPED | OUTPATIENT
Start: 2024-03-30

## 2024-03-30 NOTE — TELEPHONE ENCOUNTER
Gastrointestional Medication Protocol Passed03/30/2024 10:26 AM   Protocol Details In person appointment or virtual visit in the past 12 mos or appointment in next 3 mos   Refilled per protocol  Pantoprazole Sodium 40 MG Oral Tab EC   Last refilled on 6/21/21 #90 with 2 rf.  LOV- 5/15/23  Last labs- 11/8/23    Sent to pharmacy

## 2024-04-01 RX ORDER — FLUTICASONE PROPIONATE AND SALMETEROL 232; 14 UG/1; UG/1
1 POWDER, METERED RESPIRATORY (INHALATION) DAILY
Qty: 1 EACH | Refills: 5 | Status: SHIPPED | OUTPATIENT
Start: 2024-04-01

## 2024-06-19 ENCOUNTER — PATIENT OUTREACH (OUTPATIENT)
Dept: FAMILY MEDICINE CLINIC | Facility: CLINIC | Age: 59
End: 2024-06-19

## 2024-06-19 NOTE — PROGRESS NOTES
[facility] [employer] message sent    Dawson Beth,  I am checking in to let you know you're due for annual wellness visit and fasting labs.  Please call us this week at [phone] to get an appointment scheduled.  Thank you,     Mignon PRAETR, [place], [facility]  [phone]

## 2024-08-03 RX ORDER — METOPROLOL SUCCINATE 100 MG/1
TABLET, EXTENDED RELEASE ORAL
Qty: 180 TABLET | Refills: 2 | Status: SHIPPED | OUTPATIENT
Start: 2024-08-03

## 2024-08-03 NOTE — TELEPHONE ENCOUNTER
Hypertension Medications Protocol Pzpxob6308/03/2024 10:10 AM   Protocol Details In person appointment or virtual visit in the past 12 mos or appointment in next 3 mos    CMP or BMP in past 12 months    Last BP reading less than 140/90    EGFRCR or GFRNAA > 50          LOV 5/15/23     Last Refill   metoprolol succinate  MG Oral Tablet 24 Hr 180 tablet 2 7/27/2023       Labs  11/8/23     No future appointments.

## 2024-10-14 ENCOUNTER — TELEPHONE (OUTPATIENT)
Dept: FAMILY MEDICINE CLINIC | Facility: CLINIC | Age: 59
End: 2024-10-14

## 2024-10-14 NOTE — TELEPHONE ENCOUNTER
Ignacia is overdue for annual physical and breast cancer screening    Previously notified 06/19/2024, [employer] message read              Dawson Beth,  I am checking in to let you know you're due for annual wellness visit and fasting labs.  Please call us this week at [phone] to get an appointment scheduled.  Thank you,     Mignon PRATER, LESA, [facility]  [phone]    Last read by Ignacia Portillo at 12:56 PM on 6/19/2024.

## 2024-10-16 RX ORDER — ATORVASTATIN CALCIUM 10 MG/1
10 TABLET, FILM COATED ORAL DAILY
Qty: 90 TABLET | Refills: 0 | OUTPATIENT
Start: 2024-10-16

## 2024-10-16 RX ORDER — ATORVASTATIN CALCIUM 10 MG/1
10 TABLET, FILM COATED ORAL DAILY
Qty: 30 TABLET | Refills: 0 | Status: SHIPPED | OUTPATIENT
Start: 2024-10-16

## 2024-10-16 NOTE — TELEPHONE ENCOUNTER
LOV: 5/15/23  Last Refill:      Patient is in need of office  visit- only provided with 1  month supply

## 2024-10-16 NOTE — TELEPHONE ENCOUNTER
Cholesterol Medication Protocol Zcgjiv61/16/2024 08:52 AM   Protocol Details Lipid panel within past 12 months    In person appointment or virtual visit in the past 12 mos or appointment in next 3 mos    ALT < 80    ALT resulted within past year        Routing to provider per protocol.   atorvastatin 10 MG Oral Tab   Last refilled on 3/11/24 for #90  with 1 rf.   Last labs 11/8/23.   Last seen on 5/15/23.     No future appointments.       Thank you.

## 2024-10-16 NOTE — TELEPHONE ENCOUNTER
Per Dr Devine-Last refill is overdue for OV labs etc     Left detailed message to voicemail (per verbal release form consent with confirmed identifying message) of Doctor's note below. Patient advised to call office back and schedule appointment.

## 2024-10-17 ENCOUNTER — APPOINTMENT (OUTPATIENT)
Dept: GENERAL RADIOLOGY | Age: 59
End: 2024-10-17
Attending: NURSE PRACTITIONER
Payer: COMMERCIAL

## 2024-10-17 ENCOUNTER — HOSPITAL ENCOUNTER (OUTPATIENT)
Age: 59
Discharge: HOME | End: 2024-10-17
Payer: COMMERCIAL

## 2024-10-17 VITALS
WEIGHT: 230 LBS | BODY MASS INDEX: 39.27 KG/M2 | SYSTOLIC BLOOD PRESSURE: 186 MMHG | HEART RATE: 95 BPM | HEIGHT: 64 IN | RESPIRATION RATE: 18 BRPM | DIASTOLIC BLOOD PRESSURE: 83 MMHG | TEMPERATURE: 98 F | OXYGEN SATURATION: 96 %
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
## 2024-10-17 DIAGNOSIS — S20.212A CONTUSION OF LEFT CHEST WALL, INITIAL ENCOUNTER: ICD-10-CM

## 2024-10-17 PROCEDURE — 99213 OFFICE O/P EST LOW 20 MIN: CPT | Performed by: NURSE PRACTITIONER

## 2024-10-17 PROCEDURE — 71101 X-RAY EXAM UNILAT RIBS/CHEST: CPT | Performed by: NURSE PRACTITIONER

## 2024-10-17 RX ORDER — ORPHENADRINE CITRATE 100 MG/1
100 TABLET ORAL 2 TIMES DAILY
Qty: 14 EACH | Refills: 0 | Status: SHIPPED | OUTPATIENT
Start: 2024-10-17 | End: 2024-10-24

## 2024-10-17 NOTE — DISCHARGE INSTRUCTIONS
Follow-up with your primary care physician in one week if symptoms have not improved or symptoms are starting to get worse.  Increase fluids, keep well-hydrated.  Take Tylenol and Motrin for fever and pain.  Take the muscle laxer as directed by the pharmacy  Ice to the area ice 20 minutes on every couple hours  Over-the-counter lidocaine patches hide recommended

## 2024-10-17 NOTE — ED INITIAL ASSESSMENT (HPI)
Pt with standing and lost footing falling landing on the left chest. Pt co pain with deep breath. \"Ball\" felt under the skin. Pt hit chest into the arm rest of her recliner on Monday

## 2024-10-18 ENCOUNTER — OFFICE VISIT (OUTPATIENT)
Dept: FAMILY MEDICINE CLINIC | Facility: CLINIC | Age: 59
End: 2024-10-18
Payer: COMMERCIAL

## 2024-10-18 VITALS
TEMPERATURE: 98 F | HEART RATE: 70 BPM | DIASTOLIC BLOOD PRESSURE: 70 MMHG | RESPIRATION RATE: 16 BRPM | BODY MASS INDEX: 39.61 KG/M2 | OXYGEN SATURATION: 97 % | WEIGHT: 232 LBS | HEIGHT: 64 IN | SYSTOLIC BLOOD PRESSURE: 148 MMHG

## 2024-10-18 DIAGNOSIS — E66.01 CLASS 2 SEVERE OBESITY DUE TO EXCESS CALORIES WITH SERIOUS COMORBIDITY AND BODY MASS INDEX (BMI) OF 39.0 TO 39.9 IN ADULT (HCC): ICD-10-CM

## 2024-10-18 DIAGNOSIS — S20.212D CONTUSION OF LEFT CHEST WALL, SUBSEQUENT ENCOUNTER: Primary | ICD-10-CM

## 2024-10-18 DIAGNOSIS — Z15.89 HLA B27 (HLA B27 POSITIVE): ICD-10-CM

## 2024-10-18 DIAGNOSIS — E66.812 CLASS 2 SEVERE OBESITY DUE TO EXCESS CALORIES WITH SERIOUS COMORBIDITY AND BODY MASS INDEX (BMI) OF 39.0 TO 39.9 IN ADULT (HCC): ICD-10-CM

## 2024-10-18 DIAGNOSIS — Z23 ENCOUNTER FOR IMMUNIZATION: ICD-10-CM

## 2024-10-18 DIAGNOSIS — Z12.31 ENCOUNTER FOR SCREENING MAMMOGRAM FOR BREAST CANCER: ICD-10-CM

## 2024-10-18 DIAGNOSIS — T14.8XXA HEMATOMA: ICD-10-CM

## 2024-10-18 DIAGNOSIS — M25.50 ARTHRALGIA OF MULTIPLE JOINTS: ICD-10-CM

## 2024-10-18 LAB
ALBUMIN SERPL-MCNC: 4.8 G/DL (ref 3.2–4.8)
ALBUMIN/GLOB SERPL: 1.6 {RATIO} (ref 1–2)
ALP LIVER SERPL-CCNC: 153 U/L
ALT SERPL-CCNC: 29 U/L
ANION GAP SERPL CALC-SCNC: 3 MMOL/L (ref 0–18)
AST SERPL-CCNC: 25 U/L (ref ?–34)
BASOPHILS # BLD AUTO: 0.04 X10(3) UL (ref 0–0.2)
BASOPHILS NFR BLD AUTO: 0.6 %
BILIRUB SERPL-MCNC: 0.4 MG/DL (ref 0.2–1.1)
BUN BLD-MCNC: 10 MG/DL (ref 9–23)
CALCIUM BLD-MCNC: 10.6 MG/DL (ref 8.7–10.4)
CHLORIDE SERPL-SCNC: 108 MMOL/L (ref 98–112)
CHOLEST SERPL-MCNC: 162 MG/DL (ref ?–200)
CO2 SERPL-SCNC: 29 MMOL/L (ref 21–32)
CREAT BLD-MCNC: 0.97 MG/DL
EGFRCR SERPLBLD CKD-EPI 2021: 67 ML/MIN/1.73M2 (ref 60–?)
EOSINOPHIL # BLD AUTO: 0.19 X10(3) UL (ref 0–0.7)
EOSINOPHIL NFR BLD AUTO: 2.7 %
ERYTHROCYTE [DISTWIDTH] IN BLOOD BY AUTOMATED COUNT: 14.4 %
FASTING PATIENT LIPID ANSWER: YES
FASTING STATUS PATIENT QL REPORTED: YES
GLOBULIN PLAS-MCNC: 3 G/DL (ref 2–3.5)
GLUCOSE BLD-MCNC: 93 MG/DL (ref 70–99)
HCT VFR BLD AUTO: 35.4 %
HDLC SERPL-MCNC: 46 MG/DL (ref 40–59)
HGB BLD-MCNC: 10.5 G/DL
IMM GRANULOCYTES # BLD AUTO: 0.01 X10(3) UL (ref 0–1)
IMM GRANULOCYTES NFR BLD: 0.1 %
LDLC SERPL CALC-MCNC: 100 MG/DL (ref ?–100)
LYMPHOCYTES # BLD AUTO: 2.18 X10(3) UL (ref 1–4)
LYMPHOCYTES NFR BLD AUTO: 30.7 %
MCH RBC QN AUTO: 24.1 PG (ref 26–34)
MCHC RBC AUTO-ENTMCNC: 29.7 G/DL (ref 31–37)
MCV RBC AUTO: 81.4 FL
MONOCYTES # BLD AUTO: 0.55 X10(3) UL (ref 0.1–1)
MONOCYTES NFR BLD AUTO: 7.8 %
NEUTROPHILS # BLD AUTO: 4.12 X10 (3) UL (ref 1.5–7.7)
NEUTROPHILS # BLD AUTO: 4.12 X10(3) UL (ref 1.5–7.7)
NEUTROPHILS NFR BLD AUTO: 58.1 %
NONHDLC SERPL-MCNC: 116 MG/DL (ref ?–130)
OSMOLALITY SERPL CALC.SUM OF ELEC: 289 MOSM/KG (ref 275–295)
PLATELET # BLD AUTO: 449 10(3)UL (ref 150–450)
POTASSIUM SERPL-SCNC: 4.1 MMOL/L (ref 3.5–5.1)
PROT SERPL-MCNC: 7.8 G/DL (ref 5.7–8.2)
RBC # BLD AUTO: 4.35 X10(6)UL
SODIUM SERPL-SCNC: 140 MMOL/L (ref 136–145)
TRIGL SERPL-MCNC: 84 MG/DL (ref 30–149)
TSI SER-ACNC: 2.48 MIU/ML (ref 0.55–4.78)
VLDLC SERPL CALC-MCNC: 14 MG/DL (ref 0–30)
WBC # BLD AUTO: 7.1 X10(3) UL (ref 4–11)

## 2024-10-18 PROCEDURE — 90656 IIV3 VACC NO PRSV 0.5 ML IM: CPT | Performed by: NURSE PRACTITIONER

## 2024-10-18 PROCEDURE — 80061 LIPID PANEL: CPT | Performed by: NURSE PRACTITIONER

## 2024-10-18 PROCEDURE — 90471 IMMUNIZATION ADMIN: CPT | Performed by: NURSE PRACTITIONER

## 2024-10-18 PROCEDURE — 3078F DIAST BP <80 MM HG: CPT | Performed by: NURSE PRACTITIONER

## 2024-10-18 PROCEDURE — 85025 COMPLETE CBC W/AUTO DIFF WBC: CPT | Performed by: NURSE PRACTITIONER

## 2024-10-18 PROCEDURE — 84443 ASSAY THYROID STIM HORMONE: CPT | Performed by: NURSE PRACTITIONER

## 2024-10-18 PROCEDURE — 99214 OFFICE O/P EST MOD 30 MIN: CPT | Performed by: NURSE PRACTITIONER

## 2024-10-18 PROCEDURE — 3077F SYST BP >= 140 MM HG: CPT | Performed by: NURSE PRACTITIONER

## 2024-10-18 PROCEDURE — 80053 COMPREHEN METABOLIC PANEL: CPT | Performed by: NURSE PRACTITIONER

## 2024-10-18 PROCEDURE — 3008F BODY MASS INDEX DOCD: CPT | Performed by: NURSE PRACTITIONER

## 2024-10-18 NOTE — PROGRESS NOTES
CHIEF COMPLAINT:    Chief Complaint   Patient presents with    Urgent Care F/u     Monday night, slipped and fell, hit rib-left side, still has pain       HISTORY OF PRESENT ILLNESS:    Ignacia who has a cardiac, pulmonary, and gastrointestinal history presents today, October 18, 2024, for urgent care follow-up visit.    Reaching over a recliner, lost footing, fell into arm on October 14, 2024.  The patient sought care on October 17 for pain to chest related to fall/trauma.  X-ray completed and is negative for fracture.  Diagnosed with contusion to the chest wall.  Patient was prescribed orphenadrine extended release 100 mg every 12hrs.  This causes drowsiness.  Patient has been using topical lidocaine cream, which has provided some relief.  Today, Ignacia reports burning pain in breast area and golf ball sized mass.  Pain is made worse with breathing in deeply.  Rates pain 5/10.  Previously 7/10.    Ignacia also reports she would like routine fasting labs drawn and expresses interest in referral to rheumatology due to history of positive lab result and gradually worsening joint pain.    ALLERGIES:  Allergies[1]    CURRENT MEDICATIONS:  Current Outpatient Medications   Medication Sig Dispense Refill    atorvastatin 10 MG Oral Tab Take 1 tablet (10 mg total) by mouth daily. 30 tablet 0    cholecalciferol (VITAMIN D3) 125 MCG (5000 UT) Oral Cap Take 1 capsule (5,000 Units total) by mouth daily.      Barberry-Oreg Grape-Goldenseal (BERBERINE COMPLEX) 200-200-50 MG Oral Cap Take 500 mg by mouth as needed.      Albuterol Sulfate  (90 Base) MCG/ACT Inhalation Aero Soln Inhale into the lungs every 6 (six) hours as needed for Wheezing.      orphenadrine  MG Oral Tablet 12 Hr Take 100 mg by mouth 2 (two) times daily for 7 days. 14 each 0    METOPROLOL SUCCINATE  MG Oral Tablet 24 Hr TAKE 2 TABLETS BY MOUTH DAILY 180 tablet 2    pantoprazole 40 MG Oral Tab EC Take 1 tablet (40 mg total) by mouth every morning  before breakfast. 90 tablet 2    hydroCHLOROthiazide 25 MG Oral Tab Take 1 tablet (25 mg total) by mouth daily. 90 tablet 2    ibuprofen 600 MG Oral Tab Take 1 tablet (600 mg total) by mouth every 6 (six) hours as needed. 30 tablet 3       MEDICAL HISTORY:  Past Medical History:    Arthritis    Knees and hip    BBB (bundle branch block)    right    Belching    After drinking or eating    Bloating    Blurred vision    Chronic cough    Essential hypertension    Fatigue    GERD (gastroesophageal reflux disease)    Headache disorder    Heart palpitations    RBB    Heartburn    Hepatic hemangioma    High blood pressure    High cholesterol    Hoarseness, chronic    Within last 6 months    Leg swelling    Happened after a fall in November 2020    Night sweats    Maybe menopause    Obesity, unspecified    Pain in joints    Polycystic ovaries    Problems with swallowing    Food and medicine get stuck  sometimes    Sleep apnea    Diagnosed November 2021    Sleep disturbance    Maybe menopause    Sputum production    Within last 6 months    Uncomfortable fullness after meals    Within last 6 months    Uterine fibroid    Visual impairment    glasses    Wears glasses    Weight gain    Wheezing    More when i lay down     Past Surgical History:   Procedure Laterality Date    Cholecystectomy  2007    Colonoscopy  August 2017    Egd      Hysterectomy  05/2009    TAHBSO    Kimmie biopsy stereo nodule 1 site right (cpt=19081)  6/2014    Benign    Midurethral sling  11/02/2020    Oophorectomy Bilateral 05/2009    Reduction left  1997    Reduction right  1997    Tonsillectomy  1969    Total abdom hysterectomy       Family History   Problem Relation Age of Onset    Heart Disorder Mother     Hypertension Mother     Lipids Mother     Stroke Mother         2019    Kidney Cancer Mother     Other (lung cancer) Mother     Heart Disorder Father     Heart Attack Father 40        Brought on by Lupus 1967    Other (Other) Father         Lupus     Other (Other) Sister         Lupus    Breast Cancer Paternal Aunt 55        SECOND OCCURRANCE AGE 80    Other (Other) Paternal Aunt         Lupus    Colon Cancer Maternal Uncle     Diabetes Maternal Uncle     Diabetes Paternal Uncle     Breast Cancer Maternal Cousin Female 53        ESTIMATE     Family Status   Relation Status    Mo     Fa     Sis (Not Specified)    Pat Aunt     Maternal Unc (Not Specified)    Paternal Unc (Not Specified)    Mat Cous Fem Alive     Social History     Socioeconomic History    Marital status:    Tobacco Use    Smoking status: Never     Passive exposure: Never    Smokeless tobacco: Never   Vaping Use    Vaping status: Never Used   Substance and Sexual Activity    Alcohol use: Yes     Alcohol/week: 1.0 standard drink of alcohol     Types: 1 Glasses of wine per week     Comment: rare    Drug use: No    Sexual activity: Not Currently     Partners: Male   Other Topics Concern    Caffeine Concern Yes     Comment: 1    Exercise Yes     Comment: 3-4 miles daily     Seat Belt Yes       ROS:  GENERAL:  Denies recorded temperatures greater than 100.5F  RESPIRATORY:  Denies difficulty breathing  CARDIAC:  Denies chest pain with exertion    VITALS:   /70   Pulse 70   Temp 97.9 °F (36.6 °C) (Temporal)   Resp 16   Ht 5' 4\" (1.626 m)   Wt 232 lb (105.2 kg)   LMP 2007   SpO2 97%   BMI 39.82 kg/m²     Reviewed by Mignon Hoff MS, APRN, FNP-BC    PHYSICAL EXAM:    Constitutional:       Appears well.  Sitting upright on exam table.  Well developed, well nourished, and in no acute distress  HEENT:      Facial features symmetric. Normocephalic and atraumatic  Cardiovascular:      Heart sounds: Regular rate and rhythm without murmur      No edema of BLE  Pulmonary:      Chest expansion symmetric.  Breathing nonlabored. Lungs clear throughout     No cough.  Musculoskeletal:         Movements smooth and controlled with appropriate coordination.       Gait  is steady, nonantalgic.  Neuro:       No focal deficits, cranial nerves grossly intact.       Movements smooth and controlled, appropriate coordination without ataxia or tremors.  Skin:     Warm and dry without jaundice or rashes.     Left breast with scarring, tenderness.  No bruising.  Mildly erythematous beneath left breast.  Ill defined mass to left breast, RLQ, tender.  Psychiatric:         Alert and oriented.  Calm and cooperative.  Speech is clear.     ASSESSMENT & PLAN:    1. Contusion of left chest wall, subsequent encounter  Improving  Continue plan of care    2. Hematoma  Continue treatment plan  Icing PRN  Return to clinic if worsening  Mammogram in >6 weeks to allow for tissue to heal    3. HLA B27 (HLA B27 positive)  - Rheumatology Referral - In Network    4. Arthralgia of multiple joints  - Rheumatology Referral - In Network    5. Encounter for immunization  - Fluzone trivalent vaccine, PF 0.5mL, 6mo+ (43494)    6. Encounter for screening mammogram for breast cancer  - [facility] CATERINA 2D+3D SCREENING BILAT (CPT=77067/82639); Future    7. Class 2 severe obesity due to excess calories with serious comorbidity and body mass index (BMI) of 39.0 to 39.9 in adult (HCC)  - VENIPUNCTURE  - Lipid Panel; Future  - TSH W Reflex To Free T4; Future  - CBC With Differential With Platelet; Future  - Comp Metabolic Panel (14); Future  - Lipid Panel  - TSH W Reflex To Free T4  - CBC With Differential With Platelet  - Comp Metabolic Panel (14)    Follow-up for annual physical         [1]   Allergies  Allergen Reactions    Diclofenac OTHER (SEE COMMENTS) and Tightness in Chest     Chest tightness     Pcn [Bicillin L-A] RASH    Tetracycline Base HIVES    Cilantro ITCHING     TINGLING IN THE MOUTH

## 2024-10-18 NOTE — ED PROVIDER NOTES
Patient Seen in: [facility]      History     Chief Complaint   Patient presents with    Trauma     Stated Complaint: fall: left breast pain/swelling    Subjective:   HPI    59-year-old female presents to me care with complaints of left breast/rib pain after sustaining a fall.  Patient that she was getting up and fell over and landed on the side of the armchair with her left breast the left rib area.  Denies any numbness or tingling.  Denies any shortness breath denies any Diffley breathing.  Patient has no other concerns at this time.  The patient's medication list, past medical history and social history elements as listed in today's nurse's notes were reviewed and agreed (except as otherwise stated in the HPI).  The patient's family history reviewed and determined to be noncontributory to the presenting problem.      Objective:     Past Medical History:    Arthritis    Knees and hip    BBB (bundle branch block)    right    Belching    After drinking or eating    Bloating    Blurred vision    Chronic cough    Essential hypertension    Fatigue    GERD (gastroesophageal reflux disease)    Headache disorder    Heart palpitations    RBB    Heartburn    Hepatic hemangioma    High blood pressure    High cholesterol    Hoarseness, chronic    Within last 6 months    Leg swelling    Happened after a fall in November 2020    Night sweats    Maybe menopause    Obesity, unspecified    Pain in joints    Polycystic ovaries    Problems with swallowing    Food and medicine get stuck  sometimes    Sleep apnea    Diagnosed November 2021    Sleep disturbance    Maybe menopause    Sputum production    Within last 6 months    Uncomfortable fullness after meals    Within last 6 months    Uterine fibroid    Visual impairment    glasses    Wears glasses    Weight gain    Wheezing    More when i lay down              Past Surgical History:   Procedure Laterality Date    Cholecystectomy  2007    Colonoscopy  August 2017    Egd       Hysterectomy  05/2009    TAHBSO    Kimmie biopsy stereo nodule 1 site right (cpt=19081)  6/2014    Benign    Midurethral sling  11/02/2020    Oophorectomy Bilateral 05/2009    Reduction left  1997    Reduction right  1997    Tonsillectomy  1969    Total abdom hysterectomy                  Social History     Socioeconomic History    Marital status:    Tobacco Use    Smoking status: Never     Passive exposure: Never    Smokeless tobacco: Never   Vaping Use    Vaping status: Never Used   Substance and Sexual Activity    Alcohol use: Yes     Alcohol/week: 1.0 standard drink of alcohol     Types: 1 Glasses of wine per week     Comment: rare    Drug use: No    Sexual activity: Not Currently     Partners: Male   Other Topics Concern    Caffeine Concern Yes     Comment: 1    Exercise Yes     Comment: 3-4 miles daily     Seat Belt Yes              Review of Systems    Positive for stated complaint: fall: left breast pain/swelling  Other systems are as noted in HPI.  Constitutional and vital signs reviewed.      All other systems reviewed and negative except as noted above.    Physical Exam     ED Triage Vitals [10/17/24 1347]   BP (!) 186/83   Pulse 95   Resp 18   Temp 97.9 °F (36.6 °C)   Temp src Temporal   SpO2 96 %   O2 Device None (Room air)       Current Vitals:   Vital Signs  BP: (!) 186/83 (pt states she did not take BP meds today.)  Pulse: 95  Resp: 18  Temp: 97.9 °F (36.6 °C)  Temp src: Temporal    Oxygen Therapy  SpO2: 96 %  O2 Device: None (Room air)        Physical Exam  GENERAL: The patient is well-developed well-nourished nontoxic, non-ill-appearing  HEENT: Normocephalic.  Atraumatic.  Extraocular motions are intact  NECK: Supple.  No meningitic signs are noted.   CHEST/LUNGS: Clear to auscultation.  There is no respiratory distress noted.  Equal rise and fall of chest wall symmetrically tenderness is noted to the left anterior and lateral ribs below the breast line  HEART/CARDIOVASCULAR: Regular.   There is no tachycardia.   SKIN: There is no rash.  NEURO: The patient is awake, alert, and oriented.  The patient is cooperative.    ED Course   Labs Reviewed - No data to display       XR RIBS WITH CHEST (3 VIEWS), LEFT  (CPT=71101)    Result Date: 10/17/2024  PROCEDURE:  XR RIBS WITH CHEST (3 VIEWS), LEFT  (CPT=71101)  TECHNIQUE:  PA Chest and three views of the ribs were obtained  COMPARISON:  [facility] , XR, XR CHEST PA + LAT CHEST (CPT=71046), 6/28/2021, 6:07 AM.  INDICATIONS:  fall: left breast pain/swelling, left rib pain  PATIENT STATED HISTORY: (As transcribed by Technologist)  Patient states 3 days ago she slid in her house and hit left chest just below side of breast on the arm of a chair. Patient has pain to left lateral lower ribs.    FINDINGS:  Normal heart size and pulmonary vascularity. No pleural effusion or pneumothorax. No lobar consolidation.  Minimal atelectasis/scarring in the lower lungs.  Surgical clips the right upper abdomen.  No evidence of acute displaced left rib fracture.            CONCLUSION:  No evidence of acute displaced left rib fracture.  Minimal atelectasis/scarring in the lower lungs.   LOCATION:  [id number]     Dictated by (CST): Leobardo Duenas MD on 10/17/2024 at 2:07 PM     Finalized by (CST): Leobardo Duenas MD on 10/17/2024 at 2:08 PM             MDM   Pertinent Labs & Imaging studies reviewed. (See chart for details)  Differential diagnosis considered but not limited to: Rib fracture rib contusion pneumothorax  Patient coming in with right injury..  Radiology see above. Will treat for possible rib contusion. Will discharge on Norflex over-the-counter instructions. Patient is comfortable with this plan.  Overall Pt looks good. Non-toxic, well-hydrated and in no respiratory distress. Vital signs are reassuring. Exam is reassuring. I do not believe pt  requires and additional  diagnostic studiesor intervention. I believe pt  can be discharged home to continue evaluation as an  outpatient. Follow-up provider given. Discharge instructions given and reviewed. Return for any problems. All understand and agreewith the plan.    Please note that this report has been produced using speech recognition software and may contain errors related to that system including, but not limited to, errors in grammar, punctuation, and spelling, as well as words and phrases that possibly may have been recognized inappropriately.  If there are any questions or concerns, contact the dictating provider for clarification.    Note to patient: The 21st Century Cures Act makes medical notes like these available to patients in the interest of transparency. However, this is a medical document intended as peer to peer communication. It is written in medical language and may contain abbreviations or verbiage that are unfamiliar. It may appear blunt or direct. Medical documents are intended to carry relevant information, facts as evident, and the clinical opinion of the practitioner.       Disposition and Plan     Clinical Impression:  1. Fall, initial encounter    2. Contusion of left chest wall, initial encounter         Disposition:  Discharge  10/17/2024  2:16 pm    Follow-up:  Saad Devine,   [address]  [facility] [address]  [phone]                Medications Prescribed:  Discharge Medication List as of 10/17/2024  2:17 PM        START taking these medications    Details   orphenadrine  MG Oral Tablet 12 Hr Take 100 mg by mouth 2 (two) times daily for 7 days., Normal, Disp-14 each, R-0                 Supplementary Documentation:

## 2024-11-18 RX ORDER — ATORVASTATIN CALCIUM 10 MG/1
10 TABLET, FILM COATED ORAL DAILY
Qty: 30 TABLET | Refills: 0 | Status: SHIPPED | OUTPATIENT
Start: 2024-11-18

## 2024-11-18 RX ORDER — ATORVASTATIN CALCIUM 10 MG/1
10 TABLET, FILM COATED ORAL DAILY
Qty: 90 TABLET | Refills: 0 | OUTPATIENT
Start: 2024-11-18

## 2024-12-31 RX ORDER — ATORVASTATIN CALCIUM 10 MG/1
10 TABLET, FILM COATED ORAL DAILY
Qty: 30 TABLET | Refills: 0 | Status: SHIPPED | OUTPATIENT
Start: 2024-12-31

## 2024-12-31 NOTE — TELEPHONE ENCOUNTER
Patient comment: Can you  put script in for 60 or 90 days today. We had messaged on 12/20, but no script sent to [facility].     Cholesterol Medication Protocol Iwtujr9512/31/2024 08:33 AM   Protocol Details ALT < 80    ALT resulted within past year    Lipid panel within past 12 months    In person appointment or virtual visit in the past 12 mos or appointment in next 3 mos

## 2025-03-11 RX ORDER — ATORVASTATIN CALCIUM 10 MG/1
10 TABLET, FILM COATED ORAL DAILY
Qty: 30 TABLET | Refills: 0 | Status: SHIPPED | OUTPATIENT
Start: 2025-03-11

## 2025-03-11 NOTE — TELEPHONE ENCOUNTER
atorvastatin 10 MG Oral Tab    Cholesterol Medication Protocol Aoaefa2003/11/2025 11:31 AM   Protocol Details ALT < 80    ALT resulted within past year    Lipid panel within past 12 months    In person appointment or virtual visit in the past 12 mos or appointment in next 3 mos    Medication is active on med list      LOV 10/18/2024  Last Px:10/20/2022  Last refill on 12/31/2024, for #30, with 0 refills    No future appointments.

## 2025-03-12 NOTE — TELEPHONE ENCOUNTER
Called patient to notified patient that they haven't seen Dr. Devine in 2 years and need a physical with labs, patient stated that they will call and schedule later as \"it hectic at work.\"

## 2025-04-09 ENCOUNTER — HOSPITAL ENCOUNTER (OUTPATIENT)
Age: 60
Discharge: HOME | End: 2025-04-09
Payer: COMMERCIAL

## 2025-04-09 VITALS
HEART RATE: 76 BPM | OXYGEN SATURATION: 100 % | BODY MASS INDEX: 39 KG/M2 | DIASTOLIC BLOOD PRESSURE: 91 MMHG | SYSTOLIC BLOOD PRESSURE: 182 MMHG | WEIGHT: 230 LBS | TEMPERATURE: 98 F | RESPIRATION RATE: 18 BRPM

## 2025-04-09 DIAGNOSIS — M62.838 TRAPEZIUS MUSCLE SPASM: Primary | ICD-10-CM

## 2025-04-09 PROCEDURE — 99214 OFFICE O/P EST MOD 30 MIN: CPT | Performed by: NURSE PRACTITIONER

## 2025-04-09 RX ORDER — ACETAMINOPHEN AND CODEINE PHOSPHATE 300; 30 MG/1; MG/1
1 TABLET ORAL EVERY 6 HOURS PRN
Qty: 10 TABLET | Refills: 0 | Status: SHIPPED | OUTPATIENT
Start: 2025-04-09

## 2025-04-09 RX ORDER — ORPHENADRINE CITRATE 100 MG/1
100 TABLET ORAL 2 TIMES DAILY
Qty: 20 TABLET | Refills: 0 | Status: SHIPPED | OUTPATIENT
Start: 2025-04-09 | End: 2025-04-16

## 2025-04-09 RX ORDER — PREDNISONE 50 MG/1
50 TABLET ORAL DAILY
Qty: 5 TABLET | Refills: 0 | Status: SHIPPED | OUTPATIENT
Start: 2025-04-09 | End: 2025-04-14

## 2025-04-09 NOTE — DISCHARGE INSTRUCTIONS
Take the medications as prescribed.  Heating pad or something similar 10 minutes at a time, 3-4 times a day until resolution of symptoms.  Gentle range of motion exercises.    Keep an eye on your blood pressure, likely elevated today due to the pain.  If you develop headache, vision changes, chest pain, shortness of breath, lightheadedness please go to the emergency department.

## 2025-04-09 NOTE — ED PROVIDER NOTES
Patient Seen in: [facility]      History     Chief Complaint   Patient presents with    Neck Pain    Shoulder Pain    Back Pain     Stated Complaint: -pain in neck and shoulder    Subjective:   Nontoxic-appearing 60-year-old female with nontraumatic pain of the right side neck rating down the shoulder and upper back.  Denies chest pain or shortness of breath.  Reports history of neck issues.  Patient states that she changed desk at work, which is a little higher up.  States that the right side feels tight and sometimes spasms.  Took some leftover Tylenol with codeine last night and took Advil 800 mg at 2 PM today.  States neck feels stiff.  Denies injury or trauma or falls.  No vision changes.              Objective:     Past Medical History:    Arthritis    Knees and hip    BBB (bundle branch block)    right    Belching    After drinking or eating    Bloating    Blurred vision    Chronic cough    Essential hypertension    Fatigue    GERD (gastroesophageal reflux disease)    Headache disorder    Heart palpitations    RBB    Heartburn    Hepatic hemangioma    High blood pressure    High cholesterol    Hoarseness, chronic    Within last 6 months    Leg swelling    Happened after a fall in November 2020    Night sweats    Maybe menopause    Obesity, unspecified    Pain in joints    Polycystic ovaries    Problems with swallowing    Food and medicine get stuck  sometimes    Sleep apnea    Diagnosed November 2021    Sleep disturbance    Maybe menopause    Sputum production    Within last 6 months    Uncomfortable fullness after meals    Within last 6 months    Uterine fibroid    Visual impairment    glasses    Wears glasses    Weight gain    Wheezing    More when i lay down              Past Surgical History:   Procedure Laterality Date    Cholecystectomy  2007    Colonoscopy  August 2017    Egd      Hysterectomy  05/2009    TAHBSO    Kimmie biopsy stereo nodule 1 site right (cpt=19081)  6/2014    Benign     Midurethral sling  11/02/2020    Oophorectomy Bilateral 05/2009    Reduction left  1997    Reduction right  1997    Tonsillectomy  1969    Total abdom hysterectomy                  Social History     Socioeconomic History    Marital status:    Tobacco Use    Smoking status: Never     Passive exposure: Never    Smokeless tobacco: Never   Vaping Use    Vaping status: Never Used   Substance and Sexual Activity    Alcohol use: Yes     Alcohol/week: 1.0 standard drink of alcohol     Types: 1 Glasses of wine per week     Comment: rare    Drug use: No    Sexual activity: Not Currently     Partners: Male   Other Topics Concern    Caffeine Concern Yes     Comment: 1    Exercise Yes     Comment: 3-4 miles daily     Seat Belt Yes              Review of Systems   Constitutional: Negative.    Respiratory: Negative.     Cardiovascular: Negative.    Musculoskeletal:  Positive for back pain and neck pain.   All other systems reviewed and are negative.      Positive for stated complaint: -pain in neck and shoulder  Other systems are as noted in HPI.  Constitutional and vital signs reviewed.      All other systems reviewed and negative except as noted above.    Physical Exam     ED Triage Vitals [04/09/25 1842]   BP (!) 190/98   Pulse 76   Resp 18   Temp 97.7 °F (36.5 °C)   Temp src Oral   SpO2 100 %   O2 Device None (Room air)       Current Vitals:   Vital Signs  BP: (!) 182/91  Pulse: 76  Resp: 18  Temp: 97.7 °F (36.5 °C)  Temp src: Oral    Oxygen Therapy  SpO2: 100 %  O2 Device: None (Room air)        Physical Exam  Vitals and nursing note reviewed.   Constitutional:       Appearance: Normal appearance. She is not ill-appearing, toxic-appearing or diaphoretic.   Cardiovascular:      Rate and Rhythm: Normal rate and regular rhythm.      Pulses: Normal pulses.      Heart sounds: Normal heart sounds.   Pulmonary:      Effort: Pulmonary effort is normal. No respiratory distress.      Breath sounds: Normal breath sounds.    Musculoskeletal:      Left shoulder: No swelling, deformity, tenderness, bony tenderness or crepitus. Normal range of motion. Normal pulse.      Cervical back: No edema, signs of trauma, rigidity or crepitus. Muscular tenderness present. No pain with movement or spinous process tenderness.   Skin:     General: Skin is warm and dry.      Coloration: Skin is not jaundiced or pale.   Neurological:      Mental Status: She is alert and oriented to person, place, and time.   Psychiatric:         Behavior: Behavior normal.             ED Course   Labs Reviewed - No data to display                MDM              Medical Decision Making  Nontoxic adult female patient with no chest pain, no shortness of breath with right-sided pain that starts at the neck and radiates down the posterior shoulder and upper back region.  Some muscular tenderness on palpation with some spasming.  No spine or bony tenderness.  Likely musculoskeletal with some spasm.  Differential diagnosis also considered but is not likely include spine injury, acute coronary syndrome, pulmonary embolism.    Supportive/home management of diagnosis/illness/injury discussed. Red flag symptoms discussed.  Signs and symptoms/criteria that would necessitate reevaluation, including ER evaluation discussed.  Patient and/or responsible adult verbalize and agree with management and plan of care.    Speech recognition software was used during this dictation.  There may be minor errors in transcription.      Risk  Prescription drug management.        Disposition and Plan     Clinical Impression:  1. Trapezius muscle spasm         Disposition:  Discharge  4/9/2025  6:52 pm    Follow-up:  Saad Devine DO  [address]  [facility] [address]  [phone]    Call in 2 days      Emergency department    Go to   If symptoms worsen          Medications Prescribed:  Current Discharge Medication List        START taking these medications    Details   acetaminophen-codeine  300-30 MG Oral Tab Take 1 tablet by mouth every 6 (six) hours as needed for Pain.  Qty: 10 tablet, Refills: 0    Associated Diagnoses: Trapezius muscle spasm      orphenadrine  MG Oral Tablet 12 Hr Take 100 mg by mouth 2 (two) times daily for 7 days.  Qty: 20 tablet, Refills: 0    Associated Diagnoses: Trapezius muscle spasm      predniSONE 50 MG Oral Tab Take 1 tablet (50 mg total) by mouth daily for 5 days.  Qty: 5 tablet, Refills: 0    Associated Diagnoses: Trapezius muscle spasm                 Supplementary Documentation:

## 2025-04-10 ENCOUNTER — MED REC SCAN ONLY (OUTPATIENT)
Dept: FAMILY MEDICINE CLINIC | Facility: CLINIC | Age: 60
End: 2025-04-10

## 2025-04-16 ENCOUNTER — TELEPHONE (OUTPATIENT)
Dept: FAMILY MEDICINE CLINIC | Facility: CLINIC | Age: 60
End: 2025-04-16

## 2025-04-16 DIAGNOSIS — E78.00 ELEVATED LDL CHOLESTEROL LEVEL: Primary | ICD-10-CM

## 2025-04-16 RX ORDER — ATORVASTATIN CALCIUM 10 MG/1
10 TABLET, FILM COATED ORAL DAILY
Qty: 90 TABLET | Refills: 0 | Status: SHIPPED | OUTPATIENT
Start: 2025-04-16

## 2025-04-16 NOTE — TELEPHONE ENCOUNTER
Future Appointments   Date Time Provider Department Center   4/21/2025  3:20 PM Saad Devine DO [facility] [place]   5/13/2025  8:20 AM Saad Devine DO [facility] [place]

## 2025-04-16 NOTE — TELEPHONE ENCOUNTER
Rx sent - no further refills until seen for physical and/or blood pressure check    Ignacia is overdue for physical    Needs to schedule annual physical with Dr. Devine (PCP)

## 2025-04-16 NOTE — TELEPHONE ENCOUNTER
Last OV:10/18/2024  Last refill:03/11/2025 30 tabs, 0 refill  Last labs 10/18/2024  Medication pended, please sign if appropriate

## 2025-04-16 NOTE — TELEPHONE ENCOUNTER
Pt has enough of the following medication for 10 days (states she doesn't take everyday).  Can you please refill:     atorvastatin 10 MG Oral Tab [158251] (Order [id number]     Please send to:  [id number] - [place] [address] AT [facility] ([address], [phone], [phone]   [facility] [address] [facility] [address]   Phone: [phone] Fax: [phone]       Pt has the following appt's scheduled:    Future Appointments   Date Time Provider Department Center   4/21/2025  3:20 PM Saad Devine DO [facility] [place]   5/13/2025  8:20 AM Saad Devine DO [facility] [place]

## 2025-04-21 ENCOUNTER — OFFICE VISIT (OUTPATIENT)
Dept: FAMILY MEDICINE CLINIC | Facility: CLINIC | Age: 60
End: 2025-04-21
Payer: COMMERCIAL

## 2025-04-21 ENCOUNTER — HOSPITAL ENCOUNTER (OUTPATIENT)
Dept: GENERAL RADIOLOGY | Age: 60
Discharge: HOME | End: 2025-04-21
Attending: NURSE PRACTITIONER
Payer: COMMERCIAL

## 2025-04-21 VITALS
SYSTOLIC BLOOD PRESSURE: 170 MMHG | OXYGEN SATURATION: 100 % | DIASTOLIC BLOOD PRESSURE: 90 MMHG | RESPIRATION RATE: 16 BRPM | BODY MASS INDEX: 40.63 KG/M2 | HEART RATE: 85 BPM | HEIGHT: 64 IN | WEIGHT: 238 LBS | TEMPERATURE: 98 F

## 2025-04-21 DIAGNOSIS — M54.2 NECK PAIN: ICD-10-CM

## 2025-04-21 DIAGNOSIS — M54.2 NECK PAIN: Primary | ICD-10-CM

## 2025-04-21 DIAGNOSIS — M50.30 DDD (DEGENERATIVE DISC DISEASE), CERVICAL: ICD-10-CM

## 2025-04-21 DIAGNOSIS — I10 PRIMARY HYPERTENSION: ICD-10-CM

## 2025-04-21 PROCEDURE — 72050 X-RAY EXAM NECK SPINE 4/5VWS: CPT | Performed by: NURSE PRACTITIONER

## 2025-04-21 RX ORDER — METAXALONE 800 MG/1
800 TABLET ORAL 3 TIMES DAILY PRN
Qty: 15 TABLET | Refills: 0 | Status: SHIPPED | OUTPATIENT
Start: 2025-04-21

## 2025-04-21 NOTE — PROGRESS NOTES
CHIEF COMPLAINT:    Chief Complaint   Patient presents with    Follow - Up     Went to Urgent care for neck pain, was put on prednisone, muscle relaxer and pain medication, still having neck pain       HISTORY OF PRESENT ILLNESS:    Ignacia who has a history of hypertension, right bundle branch block, gastritis, nonalcoholic steatohepatitis,   presents today, 2025, for one health concern.    On 2025, sought care at urgent care for right sided neck pain, shoulder pain, and back pain.  Diagnosed as trapezius muscle spasm.  Treated with orphenadrine 100mg BID, prednisone 50mg, and acetaminophen-codeine 300-30mg.    Today, 2025, Ignacia presents with persistent pain.  Pain is down neck, across shoulder blade and right deltoid.    Described as tenderness and stiff, tight  Tensing up and burning  Pain is made worse with looking up  Reports in the past month has had change in desk ergonomics  Did not tolerate 50mg prednisone, reports swelling in hands and feet  Has been managing pain with ibuprofen 600-800mg as needed for pain  Applying heating pad to neck and right shoulder, rice sock  Rates pain 7/10    Previously tolerated skelaxin 800mg TID  Left hand dominant    Blood pressure elevated today, forgot hydrochlorothiazide this morning  Taking blood pressures at home, ranging between 120s-150/76-87s  Follow-up in 1 week for blood pressure check    ALLERGIES:  Allergies[1]    CURRENT MEDICATIONS:  Current Medications[2]    MEDICAL HISTORY:  Past Medical History[3]  Past Surgical History[4]  Family History[5]  Family Status   Relation Status    Mo     Fa     Sis (Not Specified)    Pat Aunt     Maternal Unc (Not Specified)    Paternal Unc (Not Specified)    Mat Cous Fem Alive     Short Social Hx on File[6]    ROS:  GENERAL:  +HPI  RESPIRATORY:  Denies difficulty breathing  CARDIAC:  Denies chest pain with exertion    VITALS:   BP (!) 170/90   Pulse 85   Temp 98.1 °F (36.7 °C)  (Temporal)   Resp 16   Ht 5' 4\" (1.626 m)   Wt 238 lb (108 kg)   LMP 04/01/2007   SpO2 100%   BMI 40.85 kg/m²   Reviewed by Mignon Hoff MS, APRN, FNP-BC    PHYSICAL EXAM:    Physical Exam  Constitutional:       General: She is not in acute distress.     Appearance: Normal appearance.   HENT:      Head: Normocephalic and atraumatic.   Cardiovascular:      Rate and Rhythm: Normal rate and regular rhythm.   Pulmonary:      Effort: Pulmonary effort is normal.      Breath sounds: Normal breath sounds.   Musculoskeletal:      Cervical back: Neck supple. No bony tenderness. Normal range of motion.      Comments: Dorsocervical fat pad   Skin:     General: Skin is warm and dry.   Neurological:      General: No focal deficit present.      Mental Status: She is alert and oriented to person, place, and time.   Psychiatric:         Mood and Affect: Mood normal.         Behavior: Behavior normal.         Thought Content: Thought content normal.         Judgment: Judgment normal.        ASSESSMENT & PLAN:    1. Neck pain  - XR CERVICAL SPINE (4VIEWS) (CPT=72050); Future  - Metaxalone (SKELAXIN) 800 MG Oral Tab; Take 1 tablet (800 mg total) by mouth 3 (three) times daily as needed for Pain.  Dispense: 15 tablet; Refill: 0  - Ortho Referral - In Network  - Physical Therapy Referral - [facility] Location    2. DDD (degenerative disc disease), cervical  - Ortho Referral - In Network  - Physical Therapy Referral - [facility]    Considering gabapentin if skelaxin does not provide relief  Pain management vs ortho spine  Follow-up 1 week    3. Primary hypertension  Blood pressure elevated today, patient forgot hydrochlorothiazide this morning  Taking blood pressures at home, ranging between 120s-150/76-87s  Follow-up in 1 week for blood pressure check           [1]   Allergies  Allergen Reactions    Diclofenac OTHER (SEE COMMENTS) and Tightness in Chest     Chest tightness     Pcn [Bicillin L-A] RASH    Tetracycline Base HIVES     Cilantro ITCHING     TINGLING IN THE MOUTH      Lidocaine RASH     PAINFUL REDNESS   [2]   Current Outpatient Medications   Medication Sig Dispense Refill    atorvastatin 10 MG Oral Tab Take 1 tablet (10 mg total) by mouth daily. 90 tablet 0    acetaminophen-codeine 300-30 MG Oral Tab Take 1 tablet by mouth every 6 (six) hours as needed for Pain. 10 tablet 0    METOPROLOL SUCCINATE  MG Oral Tablet 24 Hr TAKE 2 TABLETS BY MOUTH DAILY 180 tablet 2    pantoprazole 40 MG Oral Tab EC Take 1 tablet (40 mg total) by mouth every morning before breakfast. 90 tablet 2    hydroCHLOROthiazide 25 MG Oral Tab Take 1 tablet (25 mg total) by mouth daily. 90 tablet 2    cholecalciferol (VITAMIN D3) 125 MCG (5000 UT) Oral Cap Take 1 capsule (5,000 Units total) by mouth daily.      ibuprofen 600 MG Oral Tab Take 1 tablet (600 mg total) by mouth every 6 (six) hours as needed. 30 tablet 3    Barberry-Oreg Grape-Goldenseal (BERBERINE COMPLEX) 200-200-50 MG Oral Cap Take 500 mg by mouth as needed.      Albuterol Sulfate  (90 Base) MCG/ACT Inhalation Aero Soln Inhale into the lungs every 6 (six) hours as needed for Wheezing.     [3]   Past Medical History:   Arthritis    Knees and hip    BBB (bundle branch block)    right    Belching    After drinking or eating    Bloating    Blurred vision    Chronic cough    Essential hypertension    Fatigue    GERD (gastroesophageal reflux disease)    Headache disorder    Heart palpitations    RBB    Heartburn    Hepatic hemangioma    High blood pressure    High cholesterol    Hoarseness, chronic    Within last 6 months    Leg swelling    Happened after a fall in November 2020    Night sweats    Maybe menopause    Obesity, unspecified    Pain in joints    Polycystic ovaries    Problems with swallowing    Food and medicine get stuck  sometimes    Sleep apnea    Diagnosed November 2021    Sleep disturbance    Maybe menopause    Sputum production    Within last 6 months    Uncomfortable  fullness after meals    Within last 6 months    Uterine fibroid    Visual impairment    glasses    Wears glasses    Weight gain    Wheezing    More when i lay down   [4]   Past Surgical History:  Procedure Laterality Date    Cholecystectomy  2007    Colonoscopy  August 2017    Egd      Hysterectomy  05/2009    TAHBSO    Kimmie biopsy stereo nodule 1 site right (cpt=19081)  6/2014    Benign    Midurethral sling  11/02/2020    Oophorectomy Bilateral 05/2009    Reduction left  1997    Reduction right  1997    Tonsillectomy  1969    Total abdom hysterectomy     [5]   Family History  Problem Relation Age of Onset    Heart Disorder Mother     Hypertension Mother     Lipids Mother     Stroke Mother         2019    Kidney Cancer Mother     Other (lung cancer) Mother     Heart Disorder Father     Heart Attack Father 40        Brought on by Lupus 1967    Other (Other) Father         Lupus    Other (Other) Sister         Lupus    Breast Cancer Paternal Aunt 55        SECOND OCCURRANCE AGE 80    Other (Other) Paternal Aunt         Lupus    Colon Cancer Maternal Uncle     Diabetes Maternal Uncle     Diabetes Paternal Uncle     Breast Cancer Maternal Cousin Female 53        ESTIMATE   [6]   Social History  Socioeconomic History    Marital status:    Tobacco Use    Smoking status: Never     Passive exposure: Never    Smokeless tobacco: Never   Vaping Use    Vaping status: Never Used   Substance and Sexual Activity    Alcohol use: Yes     Alcohol/week: 1.0 standard drink of alcohol     Types: 1 Glasses of wine per week     Comment: rare    Drug use: No    Sexual activity: Not Currently     Partners: Male   Other Topics Concern    Caffeine Concern Yes     Comment: 1    Exercise Yes     Comment: 3-4 miles daily     Seat Belt Yes

## 2025-04-28 ENCOUNTER — OFFICE VISIT (OUTPATIENT)
Dept: FAMILY MEDICINE CLINIC | Facility: CLINIC | Age: 60
End: 2025-04-28
Payer: COMMERCIAL

## 2025-04-28 VITALS
SYSTOLIC BLOOD PRESSURE: 128 MMHG | OXYGEN SATURATION: 98 % | RESPIRATION RATE: 16 BRPM | HEIGHT: 64 IN | HEART RATE: 83 BPM | TEMPERATURE: 98 F | DIASTOLIC BLOOD PRESSURE: 70 MMHG | BODY MASS INDEX: 40.63 KG/M2 | WEIGHT: 238 LBS

## 2025-04-28 DIAGNOSIS — R60.0 LOWER LEG EDEMA: ICD-10-CM

## 2025-04-28 DIAGNOSIS — I10 PRIMARY HYPERTENSION: Primary | ICD-10-CM

## 2025-04-28 DIAGNOSIS — J45.990 EXERCISE-INDUCED ASTHMA (HCC): ICD-10-CM

## 2025-04-28 PROCEDURE — 3078F DIAST BP <80 MM HG: CPT | Performed by: NURSE PRACTITIONER

## 2025-04-28 PROCEDURE — 99214 OFFICE O/P EST MOD 30 MIN: CPT | Performed by: NURSE PRACTITIONER

## 2025-04-28 PROCEDURE — 3074F SYST BP LT 130 MM HG: CPT | Performed by: NURSE PRACTITIONER

## 2025-04-28 PROCEDURE — 3008F BODY MASS INDEX DOCD: CPT | Performed by: NURSE PRACTITIONER

## 2025-04-28 NOTE — PROGRESS NOTES
CHIEF COMPLAINT:    Chief Complaint   Patient presents with    Follow - Up     Follow up on blood pressure and neck pain       HISTORY OF PRESENT ILLNESS:    Ignacia presents today, 2025, for one health concern.    Hypertension  Blood pressure recheck  Controlled today  Currently taking hydrochlorothiazide 25mg and metoprolol 100mg  Positive for leg edema, expresses concern for possible blood clots due to history of elevated d-dimer  Denies redness, swelling, or recent air travel  Denies chest pain, shortness of breath, cough    Leg edema, bilateral, from mid calf to feet  Made worse with being on feet  Has been managing symptoms with compression stockings  Reports fatigue of legs with increased activity  Positive for tender veins  Denies redness or unilateral swelling    ALLERGIES:  Allergies[1]    CURRENT MEDICATIONS:  Current Medications[2]    MEDICAL HISTORY:  Past Medical History[3]  Past Surgical History[4]  Family History[5]  Family Status   Relation Status    Mo     Fa     Sis (Not Specified)    Pat Aunt     Maternal Unc (Not Specified)    Paternal Unc (Not Specified)    Mat Cous Fem Alive     Short Social Hx on File[6]    ROS:  GENERAL:  +HPI  RESPIRATORY:  Denies difficulty breathing  CARDIAC:  Denies chest pain with exertion    VITALS:   /70   Pulse 83   Temp 97.8 °F (36.6 °C) (Temporal)   Resp 16   Ht 5' 4\" (1.626 m)   Wt 238 lb (108 kg)   LMP 2007   SpO2 98%   BMI 40.85 kg/m²  Reviewed by Mignon Hoff MS, APRN, FNP-BC    PHYSICAL EXAM:    Physical Exam  Constitutional:       General: She is not in acute distress.     Appearance: Normal appearance.   HENT:      Head: Normocephalic and atraumatic.   Cardiovascular:      Rate and Rhythm: Normal rate and regular rhythm.   Pulmonary:      Effort: Pulmonary effort is normal.      Breath sounds: Normal breath sounds.   Musculoskeletal:      Cervical back: Neck supple.   Skin:     General: Skin is warm and  dry.   Neurological:      General: No focal deficit present.      Mental Status: She is alert and oriented to person, place, and time.   Psychiatric:         Mood and Affect: Mood normal.         Behavior: Behavior normal.         Thought Content: Thought content normal.         Judgment: Judgment normal.       ASSESSMENT & PLAN:    1. Primary hypertension  Controlled  Continue rx as prescribed  Reinforced compression stockings    2. Exercise-induced asthma (HCC)  Controlled  Denies cough or wheezing    3. Lower leg edema  - Vascular Surgery - In Network       [1]   Allergies  Allergen Reactions    Diclofenac OTHER (SEE COMMENTS) and Tightness in Chest     Chest tightness     Pcn [Bicillin L-A] RASH    Tetracycline Base HIVES    Cilantro ITCHING     TINGLING IN THE MOUTH      Lidocaine RASH     PAINFUL REDNESS   [2]   Current Outpatient Medications   Medication Sig Dispense Refill    Metaxalone (SKELAXIN) 800 MG Oral Tab Take 1 tablet (800 mg total) by mouth 3 (three) times daily as needed for Pain. 15 tablet 0    atorvastatin 10 MG Oral Tab Take 1 tablet (10 mg total) by mouth daily. 90 tablet 0    acetaminophen-codeine 300-30 MG Oral Tab Take 1 tablet by mouth every 6 (six) hours as needed for Pain. 10 tablet 0    METOPROLOL SUCCINATE  MG Oral Tablet 24 Hr TAKE 2 TABLETS BY MOUTH DAILY 180 tablet 2    pantoprazole 40 MG Oral Tab EC Take 1 tablet (40 mg total) by mouth every morning before breakfast. 90 tablet 2    hydroCHLOROthiazide 25 MG Oral Tab Take 1 tablet (25 mg total) by mouth daily. 90 tablet 2    cholecalciferol (VITAMIN D3) 125 MCG (5000 UT) Oral Cap Take 1 capsule (5,000 Units total) by mouth daily.      ibuprofen 600 MG Oral Tab Take 1 tablet (600 mg total) by mouth every 6 (six) hours as needed. 30 tablet 3    Barberry-Oreg Grape-Goldenseal (BERBERINE COMPLEX) 200-200-50 MG Oral Cap Take 500 mg by mouth as needed.      Albuterol Sulfate  (90 Base) MCG/ACT Inhalation Aero Soln Inhale into  the lungs every 6 (six) hours as needed for Wheezing.     [3]   Past Medical History:   Arthritis    Knees and hip    BBB (bundle branch block)    right    Belching    After drinking or eating    Bloating    Blurred vision    Chronic cough    Essential hypertension    Fatigue    GERD (gastroesophageal reflux disease)    Headache disorder    Heart palpitations    RBB    Heartburn    Hepatic hemangioma    High blood pressure    High cholesterol    Hoarseness, chronic    Within last 6 months    Leg swelling    Happened after a fall in November 2020    Night sweats    Maybe menopause    Obesity, unspecified    Pain in joints    Polycystic ovaries    Problems with swallowing    Food and medicine get stuck  sometimes    Sleep apnea    Diagnosed November 2021    Sleep disturbance    Maybe menopause    Sputum production    Within last 6 months    Uncomfortable fullness after meals    Within last 6 months    Uterine fibroid    Visual impairment    glasses    Wears glasses    Weight gain    Wheezing    More when i lay down   [4]   Past Surgical History:  Procedure Laterality Date    Cholecystectomy  2007    Colonoscopy  August 2017    Egd      Hysterectomy  05/2009    TAHBSO    Kimmie biopsy stereo nodule 1 site right (cpt=19081)  6/2014    Benign    Midurethral sling  11/02/2020    Oophorectomy Bilateral 05/2009    Reduction left  1997    Reduction right  1997    Tonsillectomy  1969    Total abdom hysterectomy     [5]   Family History  Problem Relation Age of Onset    Heart Disorder Mother     Hypertension Mother     Lipids Mother     Stroke Mother         2019    Kidney Cancer Mother     Other (lung cancer) Mother     Heart Disorder Father     Heart Attack Father 40        Brought on by Lupus 1967    Other (Other) Father         Lupus    Other (Other) Sister         Lupus    Breast Cancer Paternal Aunt 55        SECOND OCCURRANCE AGE 80    Other (Other) Paternal Aunt         Lupus    Colon Cancer Maternal Uncle     Diabetes  Maternal Uncle     Diabetes Paternal Uncle     Breast Cancer Maternal Cousin Female 53        ESTIMATE   [6]   Social History  Socioeconomic History    Marital status:    Tobacco Use    Smoking status: Never     Passive exposure: Never    Smokeless tobacco: Never   Vaping Use    Vaping status: Never Used   Substance and Sexual Activity    Alcohol use: Yes     Alcohol/week: 1.0 standard drink of alcohol     Types: 1 Glasses of wine per week     Comment: rare    Drug use: No    Sexual activity: Not Currently     Partners: Male   Other Topics Concern    Caffeine Concern Yes     Comment: 1    Exercise Yes     Comment: 3-4 miles daily     Seat Belt Yes

## 2025-05-10 ENCOUNTER — HOSPITAL ENCOUNTER (OUTPATIENT)
Dept: MAMMOGRAPHY | Age: 60
Discharge: HOME | End: 2025-05-10
Attending: NURSE PRACTITIONER
Payer: COMMERCIAL

## 2025-05-10 DIAGNOSIS — Z12.31 ENCOUNTER FOR SCREENING MAMMOGRAM FOR BREAST CANCER: ICD-10-CM

## 2025-05-10 PROCEDURE — 77067 SCR MAMMO BI INCL CAD: CPT | Performed by: NURSE PRACTITIONER

## 2025-05-10 PROCEDURE — 77063 BREAST TOMOSYNTHESIS BI: CPT | Performed by: NURSE PRACTITIONER

## 2025-05-13 ENCOUNTER — OFFICE VISIT (OUTPATIENT)
Dept: FAMILY MEDICINE CLINIC | Facility: CLINIC | Age: 60
End: 2025-05-13
Payer: COMMERCIAL

## 2025-05-13 VITALS
HEIGHT: 63.5 IN | HEART RATE: 91 BPM | TEMPERATURE: 98 F | SYSTOLIC BLOOD PRESSURE: 118 MMHG | WEIGHT: 235 LBS | BODY MASS INDEX: 41.12 KG/M2 | DIASTOLIC BLOOD PRESSURE: 76 MMHG | OXYGEN SATURATION: 98 % | RESPIRATION RATE: 16 BRPM

## 2025-05-13 DIAGNOSIS — Z00.00 ROUTINE HEALTH MAINTENANCE: Primary | ICD-10-CM

## 2025-05-13 DIAGNOSIS — R91.1 INCIDENTAL LUNG NODULE, > 3MM AND < 8MM: ICD-10-CM

## 2025-05-13 DIAGNOSIS — R73.09 ELEVATED GLUCOSE: ICD-10-CM

## 2025-05-13 DIAGNOSIS — E04.2 MULTIPLE THYROID NODULES: ICD-10-CM

## 2025-05-13 LAB
ALBUMIN SERPL-MCNC: 4.7 G/DL (ref 3.2–4.8)
ALBUMIN/GLOB SERPL: 1.6 {RATIO} (ref 1–2)
ALP LIVER SERPL-CCNC: 116 U/L (ref 50–130)
ALT SERPL-CCNC: 34 U/L (ref 10–49)
ANION GAP SERPL CALC-SCNC: 8 MMOL/L (ref 0–18)
AST SERPL-CCNC: 29 U/L (ref ?–34)
BASOPHILS # BLD AUTO: 0.04 X10(3) UL (ref 0–0.2)
BASOPHILS NFR BLD AUTO: 0.6 %
BILIRUB SERPL-MCNC: 0.4 MG/DL (ref 0.2–1.1)
BUN BLD-MCNC: 10 MG/DL (ref 9–23)
CALCIUM BLD-MCNC: 10.2 MG/DL (ref 8.7–10.6)
CHLORIDE SERPL-SCNC: 109 MMOL/L (ref 98–112)
CHOLEST SERPL-MCNC: 131 MG/DL (ref ?–200)
CO2 SERPL-SCNC: 26 MMOL/L (ref 21–32)
CREAT BLD-MCNC: 1.1 MG/DL (ref 0.55–1.02)
EGFRCR SERPLBLD CKD-EPI 2021: 57 ML/MIN/1.73M2 (ref 60–?)
EOSINOPHIL # BLD AUTO: 0.19 X10(3) UL (ref 0–0.7)
EOSINOPHIL NFR BLD AUTO: 2.8 %
ERYTHROCYTE [DISTWIDTH] IN BLOOD BY AUTOMATED COUNT: 14 %
FASTING PATIENT LIPID ANSWER: YES
FASTING STATUS PATIENT QL REPORTED: YES
GLOBULIN PLAS-MCNC: 3 G/DL (ref 2–3.5)
GLUCOSE BLD-MCNC: 114 MG/DL (ref 70–99)
HCT VFR BLD AUTO: 39.5 % (ref 35–48)
HDLC SERPL-MCNC: 47 MG/DL (ref 40–59)
HGB BLD-MCNC: 12.2 G/DL (ref 12–16)
IMM GRANULOCYTES # BLD AUTO: 0.03 X10(3) UL (ref 0–1)
IMM GRANULOCYTES NFR BLD: 0.4 %
LDLC SERPL CALC-MCNC: 68 MG/DL (ref ?–100)
LYMPHOCYTES # BLD AUTO: 1.77 X10(3) UL (ref 1–4)
LYMPHOCYTES NFR BLD AUTO: 25.8 %
MCH RBC QN AUTO: 28.6 PG (ref 26–34)
MCHC RBC AUTO-ENTMCNC: 30.9 G/DL (ref 31–37)
MCV RBC AUTO: 92.5 FL (ref 80–100)
MONOCYTES # BLD AUTO: 0.61 X10(3) UL (ref 0.1–1)
MONOCYTES NFR BLD AUTO: 8.9 %
NEUTROPHILS # BLD AUTO: 4.21 X10 (3) UL (ref 1.5–7.7)
NEUTROPHILS # BLD AUTO: 4.21 X10(3) UL (ref 1.5–7.7)
NEUTROPHILS NFR BLD AUTO: 61.5 %
NONHDLC SERPL-MCNC: 84 MG/DL (ref ?–130)
OSMOLALITY SERPL CALC.SUM OF ELEC: 296 MOSM/KG (ref 275–295)
PLATELET # BLD AUTO: 359 10(3)UL (ref 150–450)
POTASSIUM SERPL-SCNC: 4.6 MMOL/L (ref 3.5–5.1)
PROT SERPL-MCNC: 7.7 G/DL (ref 5.7–8.2)
RBC # BLD AUTO: 4.27 X10(6)UL (ref 3.8–5.3)
SODIUM SERPL-SCNC: 143 MMOL/L (ref 136–145)
T4 FREE SERPL-MCNC: 1.1 NG/DL (ref 0.8–1.7)
TRIGL SERPL-MCNC: 85 MG/DL (ref 30–149)
TSI SER-ACNC: 2.54 UIU/ML (ref 0.55–4.78)
VLDLC SERPL CALC-MCNC: 13 MG/DL (ref 0–30)
WBC # BLD AUTO: 6.9 X10(3) UL (ref 4–11)

## 2025-05-13 PROCEDURE — 3078F DIAST BP <80 MM HG: CPT | Performed by: FAMILY MEDICINE

## 2025-05-13 PROCEDURE — 80053 COMPREHEN METABOLIC PANEL: CPT | Performed by: FAMILY MEDICINE

## 2025-05-13 PROCEDURE — 85025 COMPLETE CBC W/AUTO DIFF WBC: CPT | Performed by: FAMILY MEDICINE

## 2025-05-13 PROCEDURE — 84439 ASSAY OF FREE THYROXINE: CPT | Performed by: FAMILY MEDICINE

## 2025-05-13 PROCEDURE — 83036 HEMOGLOBIN GLYCOSYLATED A1C: CPT | Performed by: FAMILY MEDICINE

## 2025-05-13 PROCEDURE — 3008F BODY MASS INDEX DOCD: CPT | Performed by: FAMILY MEDICINE

## 2025-05-13 PROCEDURE — 3074F SYST BP LT 130 MM HG: CPT | Performed by: FAMILY MEDICINE

## 2025-05-13 PROCEDURE — 84443 ASSAY THYROID STIM HORMONE: CPT | Performed by: FAMILY MEDICINE

## 2025-05-13 PROCEDURE — 99396 PREV VISIT EST AGE 40-64: CPT | Performed by: FAMILY MEDICINE

## 2025-05-13 PROCEDURE — 80061 LIPID PANEL: CPT | Performed by: FAMILY MEDICINE

## 2025-05-13 NOTE — PROGRESS NOTES
HPI:   Ignacia Portillo is a 60 year old female who presents for a complete physical exam. Symptoms: denies discharge, itching, burning or dysuria, is menopausal. Patient complains of is here for their yearly physical, has not been hospitalized the past year. no recent surgery, no new RX meds and no new allergies. Needs follow up on her thyroid and also had a pulmonary nodule on CT.is otherwise asymptomatic.. is not a smoker, but lived with smokers for a number of years. Has reflux issues takes protonix periodically and also takes iron once a week. Donates blood regularly  .     Immunization History   Administered Date(s) Administered    Covid-19 Vaccine Pfizer 30 mcg/0.3 ml 04/20/2021, 05/11/2021, 12/17/2021    FLULAVAL 6 months & older 0.5 ml Prefilled syringe (27124) 09/14/2019, 10/13/2020, 10/20/2022    FLUZONE 6 months and older PFS 0.5 ml (79190) 11/02/2021    Influenza Vaccine, trivalent (IIV3), PF 0.5mL (41820) 10/18/2024    Pneumococcal Conjugate PCV20 10/20/2022    TDAP 11/29/2020   Deferred Date(s) Deferred    FLUZONE 6 months and older PFS 0.5 ml (86370) 11/03/2016     Wt Readings from Last 6 Encounters:   05/13/25 235 lb (106.6 kg)   04/28/25 238 lb (108 kg)   04/21/25 238 lb (108 kg)   04/09/25 230 lb (104.3 kg)   10/18/24 232 lb (105.2 kg)   10/17/24 230 lb (104.3 kg)     Body mass index is 40.98 kg/m².     Lab Results   Component Value Date    GLU 93 10/18/2024     (H) 11/08/2023     (H) 10/20/2022    GLUCOSE 88 08/28/2006     Lab Results   Component Value Date    CHOLEST 162 10/18/2024    CHOLEST 180 05/15/2023    CHOLEST 126 09/14/2022     Lab Results   Component Value Date    HDL 46 10/18/2024    HDL 45 05/15/2023    HDL 44 09/14/2022     Lab Results   Component Value Date     (H) 10/18/2024     (H) 05/15/2023    LDL 65 09/14/2022     Lab Results   Component Value Date    AST 25 10/18/2024    AST 32 11/08/2023    AST 35 10/20/2022     Lab Results   Component Value Date     ALT 29 10/18/2024    ALT 48 11/08/2023    ALT 49 10/20/2022       Current Outpatient Medications   Medication Sig Dispense Refill    Metaxalone (SKELAXIN) 800 MG Oral Tab Take 1 tablet (800 mg total) by mouth 3 (three) times daily as needed for Pain. 15 tablet 0    atorvastatin 10 MG Oral Tab Take 1 tablet (10 mg total) by mouth daily. 90 tablet 0    acetaminophen-codeine 300-30 MG Oral Tab Take 1 tablet by mouth every 6 (six) hours as needed for Pain. 10 tablet 0    METOPROLOL SUCCINATE  MG Oral Tablet 24 Hr TAKE 2 TABLETS BY MOUTH DAILY 180 tablet 2    pantoprazole 40 MG Oral Tab EC Take 1 tablet (40 mg total) by mouth every morning before breakfast. 90 tablet 2    hydroCHLOROthiazide 25 MG Oral Tab Take 1 tablet (25 mg total) by mouth daily. 90 tablet 2    cholecalciferol (VITAMIN D3) 125 MCG (5000 UT) Oral Cap Take 1 capsule (5,000 Units total) by mouth daily.      ibuprofen 600 MG Oral Tab Take 1 tablet (600 mg total) by mouth every 6 (six) hours as needed. 30 tablet 3    Barberry-Oreg Grape-Goldenseal (BERBERINE COMPLEX) 200-200-50 MG Oral Cap Take 500 mg by mouth as needed.      Albuterol Sulfate  (90 Base) MCG/ACT Inhalation Aero Soln Inhale into the lungs every 6 (six) hours as needed for Wheezing.        Past Medical History:    Arthritis    Knees and hip    BBB (bundle branch block)    right    Belching    After drinking or eating    Bloating    Blurred vision    Chronic cough    Essential hypertension    Fatigue    GERD (gastroesophageal reflux disease)    Headache disorder    Heart palpitations    RBB    Heartburn    Hepatic hemangioma    High blood pressure    High cholesterol    Hoarseness, chronic    Within last 6 months    Leg swelling    Happened after a fall in November 2020    Night sweats    Maybe menopause    Obesity, unspecified    Pain in joints    Polycystic ovaries    Problems with swallowing    Food and medicine get stuck  sometimes    Sleep apnea    Diagnosed November 2021     Sleep disturbance    Maybe menopause    Sputum production    Within last 6 months    Uncomfortable fullness after meals    Within last 6 months    Uterine fibroid    Visual impairment    glasses    Wears glasses    Weight gain    Wheezing    More when i lay down      Past Surgical History:   Procedure Laterality Date    Cholecystectomy  2007    Colonoscopy  August 2017    Egd      Hysterectomy  05/2009    TAHBSO    Kmimie biopsy stereo nodule 1 site right (cpt=19081)  6/2014    Benign    Midurethral sling  11/02/2020    Oophorectomy Bilateral 05/2009    Reduction left  1997    Reduction right  1997    Tonsillectomy  1969    Total abdom hysterectomy        Family History   Problem Relation Age of Onset    Heart Disorder Mother     Hypertension Mother     Lipids Mother     Stroke Mother         2019    Kidney Cancer Mother     Other (lung cancer) Mother     Heart Disorder Father     Heart Attack Father 40        Brought on by Lupus 1967    Other (Other) Father         Lupus    Other (Other) Sister         Lupus    Breast Cancer Paternal Aunt 55        SECOND OCCURRANCE AGE 80    Other (Other) Paternal Aunt         Lupus    Colon Cancer Maternal Uncle     Diabetes Maternal Uncle     Diabetes Paternal Uncle     Breast Cancer Maternal Cousin Female 53        ESTIMATE      Social History:   Social History     Socioeconomic History    Marital status:    Tobacco Use    Smoking status: Never     Passive exposure: Never    Smokeless tobacco: Never   Vaping Use    Vaping status: Never Used   Substance and Sexual Activity    Alcohol use: Yes     Alcohol/week: 1.0 standard drink of alcohol     Types: 1 Glasses of wine per week     Comment: rare    Drug use: No    Sexual activity: Not Currently     Partners: Male   Other Topics Concern    Caffeine Concern Yes     Comment: 1    Exercise Yes     Comment: 3-4 miles daily     Seat Belt Yes     Occ: works at a bank. : . Children: 3.   Exercise: minimal.  Diet:  watches minimally     REVIEW OF SYSTEMS:   GENERAL: feels well otherwise  SKIN: denies any unusual skin lesions  EYES:denies blurred vision or double vision  HEENT: denies nasal congestion, sinus pain or ST  LUNGS: denies shortness of breath with exertion  CARDIOVASCULAR: denies chest pain on exertion  GI: denies abdominal pain,HX of  heartburn, takes Protonix periodically  : denies dysuria, vaginal discharge or itching,periods regular   MUSCULOSKELETAL: denies back pain, currently has right shoulder pain after an injury  NEURO: denies headaches  PSYCHE: denies depression or anxiety  HEMATOLOGIC: denies hx of anemia  ENDOCRINE: denies thyroid history  ALL/ASTHMA: denies hx of allergy or asthma    EXAM:   /76   Pulse 91   Temp 97.9 °F (36.6 °C) (Temporal)   Resp 16   Ht 5' 3.5\" (1.613 m)   Wt 235 lb (106.6 kg)   LMP 04/01/2007   SpO2 98%   BMI 40.98 kg/m²   Body mass index is 40.98 kg/m².   GENERAL: well developed, well nourished,in no apparent distress  SKIN: no rashes,no suspicious lesions  HEENT: atraumatic, normocephalic,ears and throat are clear  EYES:PERRLA, EOMI, normal optic disk,conjunctiva are clear  NECK: supple,no adenopathy,no bruits, has some reproducible  right sided neck pain over the Cervical paraspinals and trapezius  CHEST: no chest tenderness  LUNGS: clear to auscultation  CARDIO: RRR without murmur  GI: good BS's,no masses, HSM or tenderness  MUSCULOSKELETAL: back is not tender,FROM of the back  EXTREMITIES: no cyanosis, clubbing or edema  NEURO: Oriented times three,cranial nerves are intact,motor and sensory are grossly intact    ASSESSMENT AND PLAN:   Ignacia Portillo is a 60 year old female who presents for a complete physical exam. NO Pap and pelvic done. Order put in for mammogram and dexascan. Self breast exam explained. Health maintenance, will check fasting Lipids, CMP, and CBC. Not yet due for screening colonoscopy. Pt info handouts given for: exercise, low fat diet and  breast self-exam. Pt' s weight is Body mass index is 40.98 kg/m²., recommended low fat diet and aerobic exercise 30 minutes three times weekly.  The patient indicates understanding of these issues and agrees to the plan.  The patient is asked to return for CPX in 1 year.  Encounter Diagnoses   Name Primary?    Routine health maintenance Yes    Incidental lung nodule, > 3mm and < 8mm     Multiple thyroid nodules        Orders Placed This Encounter   Procedures    CBC W Differential W Platelet [E]    Comp Metabolic Panel (14) [E]    Lipid Panel [E]    TSH and Free T4 [E]    *Venipuncture       Meds & Refills for this Visit:  Requested Prescriptions      No prescriptions requested or ordered in this encounter       Imaging & Consults:  CT CHEST (CPT=71250)  US THYROID (PQK=32688)

## 2025-05-14 ENCOUNTER — MOBILE ENCOUNTER (OUTPATIENT)
Dept: FAMILY MEDICINE CLINIC | Facility: CLINIC | Age: 60
End: 2025-05-14

## 2025-05-14 DIAGNOSIS — R73.09 ELEVATED GLUCOSE: Primary | ICD-10-CM

## 2025-05-14 DIAGNOSIS — N28.9 RENAL INSUFFICIENCY: Primary | ICD-10-CM

## 2025-05-14 LAB
EST. AVERAGE GLUCOSE BLD GHB EST-MCNC: 114 MG/DL (ref 68–126)
HBA1C MFR BLD: 5.6 % (ref ?–5.7)

## 2025-05-17 ENCOUNTER — HOSPITAL ENCOUNTER (OUTPATIENT)
Dept: CT IMAGING | Age: 60
Discharge: HOME | End: 2025-05-17
Attending: FAMILY MEDICINE
Payer: COMMERCIAL

## 2025-05-17 DIAGNOSIS — R91.1 INCIDENTAL LUNG NODULE, > 3MM AND < 8MM: ICD-10-CM

## 2025-05-17 PROCEDURE — 71250 CT THORAX DX C-: CPT | Performed by: FAMILY MEDICINE

## 2025-05-21 ENCOUNTER — PATIENT MESSAGE (OUTPATIENT)
Dept: FAMILY MEDICINE CLINIC | Facility: CLINIC | Age: 60
End: 2025-05-21

## 2025-05-28 ENCOUNTER — HOSPITAL ENCOUNTER (OUTPATIENT)
Dept: ULTRASOUND IMAGING | Age: 60
Discharge: HOME | End: 2025-05-28
Attending: FAMILY MEDICINE
Payer: COMMERCIAL

## 2025-05-28 DIAGNOSIS — E04.2 MULTIPLE THYROID NODULES: ICD-10-CM

## 2025-05-28 DIAGNOSIS — N28.9 RENAL INSUFFICIENCY: ICD-10-CM

## 2025-05-28 PROCEDURE — 76536 US EXAM OF HEAD AND NECK: CPT | Performed by: FAMILY MEDICINE

## 2025-05-28 PROCEDURE — 76775 US EXAM ABDO BACK WALL LIM: CPT | Performed by: FAMILY MEDICINE

## 2025-06-17 RX ORDER — METOPROLOL SUCCINATE 100 MG/1
TABLET, EXTENDED RELEASE ORAL
Qty: 180 TABLET | Refills: 2 | Status: SHIPPED | OUTPATIENT
Start: 2025-06-17

## 2025-06-17 RX ORDER — HYDROCHLOROTHIAZIDE 25 MG/1
25 TABLET ORAL DAILY
Qty: 90 TABLET | Refills: 2 | Status: SHIPPED | OUTPATIENT
Start: 2025-06-17

## 2025-06-17 NOTE — TELEPHONE ENCOUNTER
Hypertension Medications Protocol Rudbsv2306/17/2025 07:06 AM   Protocol Details CMP or BMP in past 12 months    Last BP reading less than 140/90    In person appointment or virtual visit in the past 12 mos or appointment in next 3 mos    EGFRCR or GFRNAA > 50    Medication is active on med list

## 2025-06-23 ENCOUNTER — OFFICE VISIT (OUTPATIENT)
Facility: CLINIC | Age: 60
End: 2025-06-23
Payer: COMMERCIAL

## 2025-06-23 VITALS
BODY MASS INDEX: 41.99 KG/M2 | DIASTOLIC BLOOD PRESSURE: 82 MMHG | SYSTOLIC BLOOD PRESSURE: 138 MMHG | WEIGHT: 237 LBS | HEIGHT: 63 IN

## 2025-06-23 DIAGNOSIS — R60.0 BILATERAL LEG EDEMA: Primary | ICD-10-CM

## 2025-06-23 PROCEDURE — 3008F BODY MASS INDEX DOCD: CPT | Performed by: STUDENT IN AN ORGANIZED HEALTH CARE EDUCATION/TRAINING PROGRAM

## 2025-06-23 PROCEDURE — 3075F SYST BP GE 130 - 139MM HG: CPT | Performed by: STUDENT IN AN ORGANIZED HEALTH CARE EDUCATION/TRAINING PROGRAM

## 2025-06-23 PROCEDURE — 3079F DIAST BP 80-89 MM HG: CPT | Performed by: STUDENT IN AN ORGANIZED HEALTH CARE EDUCATION/TRAINING PROGRAM

## 2025-06-23 PROCEDURE — 99204 OFFICE O/P NEW MOD 45 MIN: CPT | Performed by: STUDENT IN AN ORGANIZED HEALTH CARE EDUCATION/TRAINING PROGRAM

## 2025-06-23 NOTE — PROGRESS NOTES
Woody Porter MD  Vascular Surgery  [facility]         VASCULAR SURGERY CONSULT NOTE      Ignacia Portillo   :  3/3/1965  MR#  [id number]    REFERRING PHYSICIAN:  Mignon Hoff  PRIMARY CARE PHYSICIAN:  Saad Devine DO    Chief Complaint   Patient presents with    Referral     Referral from Mignon MCFADDEN for Bilateral Lower extremity edema and Heaviness for about ten years.  She reports She has increased swelling in the Left ankle and foot and worse at the end of the day.  The patient is wearing compression stockings.          \" I was requested by Nilson to visit the patient for BLE edema\"    HPI:    The patient is a 60 year old female who has been referred to the clinic today for an evaluation of her bilateral lower extremity heaviness, tiredness, edema and pain after prolonged standing. This is interfering with her activities of daily living and exercise. She has been wearing compression stockings in the recent past with minimal improvement    PAST MEDICAL HISTORY:   Past Medical History[1]    PAST SURGICAL HISTORY:   Past Surgical History[2]     MEDICATIONS:   Current Medications[3]    ALLERGIES:   Allergies[4]    SOCIAL HISTORY:   Short Social Hx on File[5]     FAMILY HISTORY:   Family History[6]    ROS:   A 12 point review of systems with pertinent positives and negatives listed in the HPI.    PHYSICAL EXAM:   /82 (BP Location: Left arm, Patient Position: Sitting)   Ht 5' 3\" (1.6 m)   Wt 237 lb (107.5 kg)   LMP 2007   BMI 41.98 kg/m²   GENERAL: alert and orientated X 3, well developed, well nourished, in no apparent distress  HEENT: ears and throat are clear  NECK: supple, no lymphadenopathy, thyroid wnl  CAROTID: No bruits  RESPIRATORY: no rales, rhonchi, or wheezes B  CARDIO: RRR without murmur, no murmur, no gallop   ABDOMEN: soft, non-tender with no palpable aneurysm or masses  BACK: normal, no tenderness  SKIN: no rashes, warm and dry  NEURO/PSYCH:  orientated x3, normal mood and affect, no sensory or motor deficit  EXTREMITIES: full range of motion, no tenderness or edema in either leg.   VASCULAR  Pulse exam right: femoral 1+, DP  1+, PT  1+  Pulse exam left: femoral  1+, DP  1+, PT  1+        IMPRESSION:   Bilateral  lower extremity pain due to venous insufficiency.     PLAN:     We reviewed the options for management for venous insufficiency, including conservative therapy, sclerotherapy, stab phlebectomy, and endovenous laser ablation. The patient was educated in the benign condition of venous disease.  We reviewed the importance of wearing her prescribed compression stockings daily and consistently. We also reviewed other types of conservative measures, such as periodic leg elevation, walking for exercise, analgesic use, attempts at weight loss, and the avoidance of prolonged standing.  I have sent the patient for a venous reflux ultrasound. Should the ultrasound study reveal venous reflux with dilation and she does not have relief of symptoms with conservative therapy, then endovenous laser ablation may a possible treatment option.  I explained to the patient that her insurance company may require a 6-12 week trial period of conservative therapy prior to authorizing endovenous laser therapy (EVLT) treatment.  The patient understood and agreed to proceed with this treatment plan, all of her questions were answered during this clinic visit. She was asked to FU in several weeks to assess her response to conservative treatment.      Thank you for allowing to participate in the care of your patients. Please do not hesitate to contact my office with any questions.    Sincerely,  Woody Porter MD  Division of Vascular Surgery       [1]   Past Medical History:   Arthritis    Knees and hip    BBB (bundle branch block)    right    Belching    After drinking or eating    Bloating    Blurred vision    Chronic cough    Essential hypertension    Fatigue    GERD  (gastroesophageal reflux disease)    Headache disorder    Heart palpitations    RBB    Heartburn    Hepatic hemangioma    High blood pressure    High cholesterol    Hoarseness, chronic    Within last 6 months    Leg swelling    Happened after a fall in November 2020    Night sweats    Maybe menopause    Obesity, unspecified    Pain in joints    Polycystic ovaries    Problems with swallowing    Food and medicine get stuck  sometimes    Sleep apnea    Diagnosed November 2021    Sleep disturbance    Maybe menopause    Sputum production    Within last 6 months    Uncomfortable fullness after meals    Within last 6 months    Uterine fibroid    Visual impairment    glasses    Wears glasses    Weight gain    Wheezing    More when i lay down   [2]   Past Surgical History:  Procedure Laterality Date    Cholecystectomy  2007    Colonoscopy  August 2017    Egd      Hysterectomy  05/2009    TAHBSO    Kimmie biopsy stereo nodule 1 site right (cpt=19081)  6/2014    Benign    Midurethral sling  11/02/2020    Oophorectomy Bilateral 05/2009    Reduction left  1997    Reduction right  1997    Tonsillectomy  1969    Total abdom hysterectomy     [3]   Current Outpatient Medications   Medication Sig Dispense Refill    hydroCHLOROthiazide 25 MG Oral Tab Take 1 tablet (25 mg total) by mouth daily. 90 tablet 2    metoprolol succinate  MG Oral Tablet 24 Hr TAKE 2 TABLETS BY MOUTH DAILY 180 tablet 2    Metaxalone (SKELAXIN) 800 MG Oral Tab Take 1 tablet (800 mg total) by mouth 3 (three) times daily as needed for Pain. 15 tablet 0    atorvastatin 10 MG Oral Tab Take 1 tablet (10 mg total) by mouth daily. 90 tablet 0    acetaminophen-codeine 300-30 MG Oral Tab Take 1 tablet by mouth every 6 (six) hours as needed for Pain. 10 tablet 0    pantoprazole 40 MG Oral Tab EC Take 1 tablet (40 mg total) by mouth every morning before breakfast. 90 tablet 2    cholecalciferol (VITAMIN D3) 125 MCG (5000 UT) Oral Cap Take 1 capsule (5,000 Units total)  by mouth daily.      ibuprofen 600 MG Oral Tab Take 1 tablet (600 mg total) by mouth every 6 (six) hours as needed. 30 tablet 3    Barberry-Oreg Grape-Goldenseal (BERBERINE COMPLEX) 200-200-50 MG Oral Cap Take 500 mg by mouth as needed.      Albuterol Sulfate  (90 Base) MCG/ACT Inhalation Aero Soln Inhale into the lungs every 6 (six) hours as needed for Wheezing.     [4]   Allergies  Allergen Reactions    Diclofenac OTHER (SEE COMMENTS) and Tightness in Chest     Chest tightness     Pcn [Bicillin L-A] RASH    Tetracycline Base HIVES    Cilantro ITCHING     TINGLING IN THE MOUTH      Lidocaine RASH     PAINFUL REDNESS   [5]   Social History  Socioeconomic History    Marital status:    Tobacco Use    Smoking status: Never     Passive exposure: Never    Smokeless tobacco: Never   Vaping Use    Vaping status: Never Used   Substance and Sexual Activity    Alcohol use: Yes     Alcohol/week: 1.0 standard drink of alcohol     Types: 1 Glasses of wine per week     Comment: rare    Drug use: No    Sexual activity: Not Currently     Partners: Male   Other Topics Concern    Caffeine Concern Yes     Comment: 1    Exercise Yes     Comment: 3-4 miles daily     Seat Belt Yes   [6]   Family History  Problem Relation Age of Onset    Heart Disorder Mother     Hypertension Mother     Lipids Mother     Stroke Mother         2019    Kidney Cancer Mother     Other (lung cancer) Mother     Heart Disorder Father     Heart Attack Father 40        Brought on by Lupus 1967    Other (Other) Father         Lupus    Other (Other) Sister         Lupus    Breast Cancer Paternal Aunt 55        SECOND OCCURRANCE AGE 80    Other (Other) Paternal Aunt         Lupus    Colon Cancer Maternal Uncle     Diabetes Maternal Uncle     Diabetes Paternal Uncle     Breast Cancer Maternal Cousin Female 53        ESTIMATE

## 2025-07-11 ENCOUNTER — TELEPHONE (OUTPATIENT)
Facility: CLINIC | Age: 60
End: 2025-07-11

## 2025-07-11 NOTE — TELEPHONE ENCOUNTER
ZEE LMOVM FOR PT TO RESCHEDULE US. Please transfer to [id number] if patient calls main line. Thanks!

## 2025-08-18 ENCOUNTER — NURSE ONLY (OUTPATIENT)
Facility: CLINIC | Age: 60
End: 2025-08-18

## 2025-08-18 DIAGNOSIS — R60.0 BILATERAL LEG EDEMA: ICD-10-CM

## 2025-08-18 PROCEDURE — 93970 EXTREMITY STUDY: CPT | Performed by: STUDENT IN AN ORGANIZED HEALTH CARE EDUCATION/TRAINING PROGRAM

## (undated) DIAGNOSIS — G43.109 MIGRAINE WITH VISUAL AURA: Primary | ICD-10-CM

## (undated) DIAGNOSIS — E78.2 MIXED HYPERLIPIDEMIA: ICD-10-CM

## (undated) DIAGNOSIS — E78.00 ELEVATED LDL CHOLESTEROL LEVEL: Primary | ICD-10-CM

## (undated) DIAGNOSIS — H53.9 VISUAL DISTURBANCE OF ONE EYE: ICD-10-CM

## (undated) DIAGNOSIS — G43.109 OCULAR MIGRAINE: Primary | ICD-10-CM

## (undated) DEVICE — #15 STERILE STAINLESS BLADE: Brand: STERILE STAINLESS BLADES

## (undated) DEVICE — STERILE POLYISOPRENE POWDER-FREE SURGICAL GLOVES WITH EMOLLIENT COATING: Brand: PROTEXIS

## (undated) DEVICE — SOL H2O IV

## (undated) DEVICE — SOL  .9 1000ML BTL

## (undated) DEVICE — DERMABOND LIQUID ADHESIVE

## (undated) DEVICE — GYN CDS: Brand: MEDLINE INDUSTRIES, INC.

## (undated) DEVICE — VIOLET BRAIDED (POLYGLACTIN 910), SYNTHETIC ABSORBABLE SUTURE: Brand: COATED VICRYL

## (undated) DEVICE — KENDALL SCD EXPRESS SLEEVES, KNEE LENGTH, MEDIUM: Brand: KENDALL SCD

## (undated) DEVICE — STANDARD HYPODERMIC NEEDLE,POLYPROPYLENE HUB: Brand: MONOJECT

## (undated) DEVICE — TUBING CYSTO

## (undated) DEVICE — STERILE POLYISOPRENE POWDER-FREE SURGICAL GLOVES: Brand: PROTEXIS

## (undated) NOTE — LETTER
[facility]  Phone: [phone]  OUTSIDE TESTING RESULT REQUEST      TO:  Saad Devine      Today's Date: 10/26/20    FAX #: [phone]     IMPORTANT: FOR YOUR IMMEDIATE ATTENTION  Please FAX all test results listed below t

## (undated) NOTE — LETTER
[address]  [id number]  [place] [address]  [facility]: [phone]  FAX: [phone]   Consent to Procedure/Sedation    Date: __6/16/2020_____    Time: ___7:38 AM ___    1.  I authorize the performance ___________________________    Signature of person authorized to consent for patient: Relationship to patient:  ___________________________    ___________________    Witness: ____________________     Date: ______________    Printed: 6/16/2020   7:38 AM

## (undated) NOTE — LETTER
ASTHMA ACTION PLAN for Brenton Babcock     : 3/3/1965     Date: 18  Doctor:  Bety Harris DO  Phone for doctor or clinic: [address] [place] [address]  [address] [phone] 3. Red - Stop!  Danger! <50% Personal Best Peak Flow  Continue Controller Medications But ADD:   Medicine not helping  Breathing is hard and fast  Nose opens wide  Can't walk  Ribs show  Can't talk well Medicine How much to take When to take it    If your s

## (undated) NOTE — LETTER
[facility], [facility], [address] [address] [phone]          01/25/23    RE: Rj Chandler  3/3/1965    To Whom it may concern:    Ms. Yunior Blount is diagnosed with state II lymphedema of the bilateral lower extremities secondary to venous insufficiency and obesity. . Related etiology is positive for scarring from cholecystectomy, hysterectomy, C Section and 4 laparoscopic surgeries to drain ovarian cysts. She suffers with swelling from feet to things with proximal extension into her abdomen, 7/10 pain, scarring that impedes lymphatic flow, skin hyperpigmentation, hyperplasia, and impaired mobility. Ms. Yunior Blount has diligently complied with use of compression garments, exercise and leg elevation since 2020. She currently attends lymphedema therapy with a certified therapist where she recently began self -MLD massage. These efforts have failed to adequately control swelling and pain with ongoing impact to her quality of life and daily functioning. I have no prescribed the Flexitouch Plus ( [facility] ) advanced compression pump for daily, lifetime use to set normal pressure using program L1(60 minutes), L4 (45 minutes) and L2(16 minutes as needed). This pump will treat bother her abdomen and legs with gentle, calibrated pressure. Basic pumps ([facility] ) are ruled out as treatment option because her trial with such pump cause intolerable pain. Please offer Flexitouch coverage to Ms. Yunior Blount so she may begin more advanced treatment for her lymphedema and achieve better control of symptoms.  You may contact my office for any questions    Sincerely,           Yuli Monday, DO

## (undated) NOTE — LETTER
4/17/2019        Paul Bowie  [address] [facility] [address]      Dear Paul Bowie. To help us provide the highest quality medical care, [facility] uses a sophisticated computer system to track our patient records.  During a re

## (undated) NOTE — Clinical Note
Thank you for referring Janet Muniz to the [facility]. I met with her in consultation today via person. I have ordered labs, referred for a nutrition consultation with our dietician. She prefers lifestyle intervention to start and will follow-up with me in about 6-8 weeks.

## (undated) NOTE — LETTER
Date & Time: 3/16/2022, 8:41 PM  Patient: Fauzia Hilda  Encounter Provider(s):    Ryder Joel PA-C       To Whom It May Concern:    Brock Mantilla was seen and treated in our department on 3/16/2022. She should not return to work until 3/18/22.     If you have any questions or concerns, please do not hesitate to call.        _____________________________  Physician/APC Signature

## (undated) NOTE — MR AVS SNAPSHOT
[address] [facility] [address] [phone]               Thank you for choosing us for your health care visit with Gladys Chilel DO.   We are glad to serve you and happy to provide you with this sum Take 1 tablet (800 mg total) by mouth 3 (three) times daily. Commonly known as:  SKELAXIN           MetFORMIN HCl 1000 MG Tabs   Take 1 tablet (1,000 mg total) by mouth 2 (two) times daily with meals.    Commonly known as:  GLUCOPHAGE           Metoprolol Eat plenty of protein, keep the fat content low Sugars:  sodas and sports drinks, candies and desserts   Eat plenty of low-fat dairy products High fat meats and dairy   Choose whole grain products Foods high in sodium   Water is best for hydration Fast faustina

## (undated) NOTE — LETTER
Patient Name: Clementina Zamudio  YOB: 1965          MRN number:  [id number]  Date:  6/21/2021  Referring Physician:  Indira Ravi         LOWER EXTREMITY EVALUATION:    Referring Physician: Dr. Amalia Aiken   Diagnosis: Right Knee Osteoarthritis     Livan OBJECTIVE:   Observation: The patient presents with the structural assessment of minor increase in internal rotation of the LEs. Gait: Decreased step and stride length of the LE. Palpation: Increased pain with palpation at the distal quadriceps.       A negotiation  5. Patient will demonstrate an increase in MLT of the anterior hip musculature in order to return to sustained ambulation and stair negotiation.       Frequency / Duration: Patient will be seen for 2 x/week or a total of 12 visits over a 90 day

## (undated) NOTE — Clinical Note
Chu 97 saw Carmen León today with mixed UI. I've recommended bladder testing. I will work to manage her sx. I appreciate the opportunity to participate in her care.  Thanks, Israel Bustillos

## (undated) NOTE — LETTER
Patient Name: Joe Lopez  YOB: 1965          MRN number:  [id number]  Date:  9/3/2020  Referring Physician:  Sheyla Larkin    Discharge Summary      Pt has attended 7, cancelled 1, and no shown 0 visits in Physical Therapy.      Subjectiv [de-identified] certification required: No  Please co-sign or sign and return this letter via fax as soon as possible to [phone]. I certify the need for these services furnished under this plan of treatment and while under my care.     X_________________

## (undated) NOTE — LETTER
05/19/18        Danni Markham  [address] [facility] [address]      Dear Anais Torres,    [address] [facility] records indicate that you have outstanding fasting lab work and or testing that was ordered for you and has not yet been completed:  Lab Frequency Next Occurrenc

## (undated) NOTE — LETTER
ASTHMA ACTION PLAN for Guero Abel     : 3/3/1965     Date: 2018  Provider:  Gladys Chilel DO  Phone for doctor or clinic: [address] [place] [address]  [address] [phone]

## (undated) NOTE — LETTER
[place] [id number]    3/31/2021      Dear  Kevin Degroot    In order to provide the highest quality care, [facility] uses a sophisticated computer system to track our patient's thomas

## (undated) NOTE — LETTER
Patient Name: Lora Holland  YOB: 1965          MRN number:  [id number]  Date:  7/19/2021  Referring Physician:  Charlene Gabriel    Dear Dr. Josefina Hi,

## (undated) NOTE — LETTER
[facility] Testing Department  Phone: [phone]  OUTSIDE TESTING RESULT REQUEST      TO: Dr. Milton Dover and staff    Today's Date: 10/26/20    FAX #: [phone]     IMPORTANT: FOR YOUR IMMEDIATE ATTENTION  Please FAX all test results listed bel

## (undated) NOTE — LETTER
Patient Name: Raza Roy  YOB: 1965          MRN number:  [id number]  Date:  7/19/2021  Referring Physician:  Claire Tom      Dear Dr. Cristi Goddard,    Discharge Summary  Pt has attended 8 visits in Physical Therapy.     Right Knee OA         Au planning and for this course of care. Thank you for your referral. If you have any questions, please contact me at Dept: [phone].     Sincerely,  Electronically signed by therapist: Loreto Wyatt PT

## (undated) NOTE — MR AVS SNAPSHOT
[address] [facility] [address]  [phone]               Thank you for choosing us for your health care visit with Bety Harris DO.   We are glad to serve you and happy to provide you with this sum Metoprolol Succinate  MG Tb24   TAKE 1 TABLET BY MOUTH DAILY   Commonly known as:   Toprol XL           Pantoprazole Sodium 40 MG Tbec   Take 1 tablet (40 mg total) by mouth every morning before breakfast.   Commonly known as:  PROTONIX

## (undated) NOTE — LETTER
[address] [place] [phone]                11/23/2018        Roosevelt Richardson  [address] [facility] [place] [address]      Dear Roosevelt Richardson.     To help us